# Patient Record
Sex: MALE | Race: WHITE | NOT HISPANIC OR LATINO
[De-identification: names, ages, dates, MRNs, and addresses within clinical notes are randomized per-mention and may not be internally consistent; named-entity substitution may affect disease eponyms.]

---

## 2018-07-09 PROBLEM — Z00.00 ENCOUNTER FOR PREVENTIVE HEALTH EXAMINATION: Status: ACTIVE | Noted: 2018-07-09

## 2018-07-31 PROBLEM — M54.5 BACK PAIN, LUMBOSACRAL: Status: ACTIVE | Noted: 2018-07-31

## 2018-08-02 ENCOUNTER — APPOINTMENT (OUTPATIENT)
Dept: ORTHOPEDIC SURGERY | Facility: CLINIC | Age: 63
End: 2018-08-02
Payer: MEDICAID

## 2018-08-02 VITALS
BODY MASS INDEX: 25.73 KG/M2 | HEIGHT: 72 IN | HEART RATE: 70 BPM | WEIGHT: 190 LBS | DIASTOLIC BLOOD PRESSURE: 78 MMHG | SYSTOLIC BLOOD PRESSURE: 153 MMHG

## 2018-08-02 DIAGNOSIS — M54.5 LOW BACK PAIN: ICD-10-CM

## 2018-08-02 DIAGNOSIS — Z82.61 FAMILY HISTORY OF ARTHRITIS: ICD-10-CM

## 2018-08-02 DIAGNOSIS — Z87.39 PERSONAL HISTORY OF OTHER DISEASES OF THE MUSCULOSKELETAL SYSTEM AND CONNECTIVE TISSUE: ICD-10-CM

## 2018-08-02 DIAGNOSIS — Z87.891 PERSONAL HISTORY OF NICOTINE DEPENDENCE: ICD-10-CM

## 2018-08-02 DIAGNOSIS — Z85.828 PERSONAL HISTORY OF OTHER MALIGNANT NEOPLASM OF SKIN: ICD-10-CM

## 2018-08-02 DIAGNOSIS — Z80.9 FAMILY HISTORY OF MALIGNANT NEOPLASM, UNSPECIFIED: ICD-10-CM

## 2018-08-02 DIAGNOSIS — L93.1 SUBACUTE CUTANEOUS LUPUS ERYTHEMATOSUS: ICD-10-CM

## 2018-08-02 PROCEDURE — 99205 OFFICE O/P NEW HI 60 MIN: CPT

## 2018-08-02 PROCEDURE — 72100 X-RAY EXAM L-S SPINE 2/3 VWS: CPT

## 2018-08-02 PROCEDURE — 72170 X-RAY EXAM OF PELVIS: CPT

## 2018-08-02 RX ORDER — ALPRAZOLAM 2 MG/1
TABLET ORAL
Refills: 0 | Status: ACTIVE | COMMUNITY

## 2018-08-02 RX ORDER — METFORMIN HYDROCHLORIDE 625 MG/1
TABLET ORAL
Refills: 0 | Status: ACTIVE | COMMUNITY

## 2018-08-09 ENCOUNTER — OTHER (OUTPATIENT)
Age: 63
End: 2018-08-09

## 2018-08-24 ENCOUNTER — APPOINTMENT (OUTPATIENT)
Dept: ORTHOPEDIC SURGERY | Facility: CLINIC | Age: 63
End: 2018-08-24
Payer: MEDICAID

## 2018-08-24 VITALS
BODY MASS INDEX: 25.73 KG/M2 | HEART RATE: 72 BPM | WEIGHT: 190 LBS | HEIGHT: 72 IN | DIASTOLIC BLOOD PRESSURE: 75 MMHG | SYSTOLIC BLOOD PRESSURE: 166 MMHG

## 2018-08-24 PROCEDURE — 99215 OFFICE O/P EST HI 40 MIN: CPT

## 2018-09-06 ENCOUNTER — APPOINTMENT (OUTPATIENT)
Dept: ORTHOPEDIC SURGERY | Facility: CLINIC | Age: 63
End: 2018-09-06
Payer: MEDICAID

## 2018-09-06 VITALS
SYSTOLIC BLOOD PRESSURE: 178 MMHG | WEIGHT: 190 LBS | HEIGHT: 72 IN | DIASTOLIC BLOOD PRESSURE: 88 MMHG | BODY MASS INDEX: 25.73 KG/M2 | HEART RATE: 76 BPM

## 2018-09-06 PROCEDURE — 99214 OFFICE O/P EST MOD 30 MIN: CPT

## 2018-09-24 ENCOUNTER — OTHER (OUTPATIENT)
Age: 63
End: 2018-09-24

## 2018-10-25 ENCOUNTER — OTHER (OUTPATIENT)
Age: 63
End: 2018-10-25

## 2018-10-29 ENCOUNTER — APPOINTMENT (OUTPATIENT)
Dept: ORTHOPEDIC SURGERY | Facility: CLINIC | Age: 63
End: 2018-10-29
Payer: MEDICAID

## 2018-10-29 ENCOUNTER — OTHER (OUTPATIENT)
Age: 63
End: 2018-10-29

## 2018-10-29 VITALS
WEIGHT: 190 LBS | SYSTOLIC BLOOD PRESSURE: 170 MMHG | HEART RATE: 73 BPM | DIASTOLIC BLOOD PRESSURE: 88 MMHG | HEIGHT: 72 IN | BODY MASS INDEX: 25.73 KG/M2

## 2018-10-29 PROCEDURE — 99214 OFFICE O/P EST MOD 30 MIN: CPT

## 2018-10-29 PROCEDURE — 73502 X-RAY EXAM HIP UNI 2-3 VIEWS: CPT | Mod: RT

## 2018-11-02 ENCOUNTER — OUTPATIENT (OUTPATIENT)
Dept: OUTPATIENT SERVICES | Facility: HOSPITAL | Age: 63
LOS: 1 days | End: 2018-11-02

## 2018-11-02 ENCOUNTER — APPOINTMENT (OUTPATIENT)
Dept: ULTRASOUND IMAGING | Facility: CLINIC | Age: 63
End: 2018-11-02
Payer: MEDICAID

## 2018-11-02 DIAGNOSIS — M16.11 UNILATERAL PRIMARY OSTEOARTHRITIS, RIGHT HIP: ICD-10-CM

## 2018-11-02 PROCEDURE — 73525 CONTRAST X-RAY OF HIP: CPT | Mod: 26,RT

## 2018-11-02 PROCEDURE — 27093 INJECTION FOR HIP X-RAY: CPT | Mod: RT

## 2018-11-19 ENCOUNTER — APPOINTMENT (OUTPATIENT)
Dept: ORTHOPEDIC SURGERY | Facility: CLINIC | Age: 63
End: 2018-11-19
Payer: MEDICAID

## 2018-11-19 VITALS
BODY MASS INDEX: 25.73 KG/M2 | DIASTOLIC BLOOD PRESSURE: 76 MMHG | WEIGHT: 190 LBS | HEART RATE: 67 BPM | HEIGHT: 72 IN | SYSTOLIC BLOOD PRESSURE: 163 MMHG

## 2018-11-19 PROCEDURE — 99213 OFFICE O/P EST LOW 20 MIN: CPT

## 2018-11-30 ENCOUNTER — APPOINTMENT (OUTPATIENT)
Dept: PHYSICAL MEDICINE AND REHAB | Facility: CLINIC | Age: 63
End: 2018-11-30
Payer: MEDICAID

## 2018-11-30 VITALS
WEIGHT: 190 LBS | DIASTOLIC BLOOD PRESSURE: 88 MMHG | BODY MASS INDEX: 25.73 KG/M2 | SYSTOLIC BLOOD PRESSURE: 171 MMHG | HEIGHT: 72 IN

## 2018-11-30 DIAGNOSIS — M47.816 SPONDYLOSIS W/OUT MYELOPATHY OR RADICULOPATHY, LUMBAR REGION: ICD-10-CM

## 2018-11-30 PROCEDURE — 99204 OFFICE O/P NEW MOD 45 MIN: CPT

## 2018-12-03 ENCOUNTER — OTHER (OUTPATIENT)
Age: 63
End: 2018-12-03

## 2018-12-03 PROBLEM — M47.816 LUMBAR SPONDYLOSIS: Status: ACTIVE | Noted: 2018-08-02

## 2018-12-13 ENCOUNTER — APPOINTMENT (OUTPATIENT)
Dept: ORTHOPEDIC SURGERY | Facility: CLINIC | Age: 63
End: 2018-12-13

## 2018-12-30 ENCOUNTER — TRANSCRIPTION ENCOUNTER (OUTPATIENT)
Age: 63
End: 2018-12-30

## 2018-12-31 ENCOUNTER — APPOINTMENT (OUTPATIENT)
Dept: PHYSICAL MEDICINE AND REHAB | Facility: CLINIC | Age: 63
End: 2018-12-31
Payer: MEDICAID

## 2018-12-31 ENCOUNTER — OUTPATIENT (OUTPATIENT)
Dept: OUTPATIENT SERVICES | Facility: HOSPITAL | Age: 63
LOS: 1 days | End: 2018-12-31
Payer: MEDICAID

## 2018-12-31 DIAGNOSIS — M54.16 RADICULOPATHY, LUMBAR REGION: ICD-10-CM

## 2018-12-31 LAB — GLUCOSE BLDC GLUCOMTR-MCNC: 145 MG/DL — HIGH (ref 70–99)

## 2018-12-31 PROCEDURE — 76000 FLUOROSCOPY <1 HR PHYS/QHP: CPT

## 2018-12-31 PROCEDURE — 82962 GLUCOSE BLOOD TEST: CPT

## 2018-12-31 PROCEDURE — 64483 NJX AA&/STRD TFRM EPI L/S 1: CPT

## 2018-12-31 PROCEDURE — 64484 NJX AA&/STRD TFRM EPI L/S EA: CPT

## 2018-12-31 PROCEDURE — 64484 NJX AA&/STRD TFRM EPI L/S EA: CPT | Mod: RT

## 2018-12-31 PROCEDURE — 64483 NJX AA&/STRD TFRM EPI L/S 1: CPT | Mod: RT

## 2019-01-25 ENCOUNTER — APPOINTMENT (OUTPATIENT)
Dept: ORTHOPEDIC SURGERY | Facility: CLINIC | Age: 64
End: 2019-01-25
Payer: MEDICAID

## 2019-01-25 VITALS
HEIGHT: 72 IN | BODY MASS INDEX: 25.73 KG/M2 | HEART RATE: 73 BPM | DIASTOLIC BLOOD PRESSURE: 85 MMHG | SYSTOLIC BLOOD PRESSURE: 155 MMHG | WEIGHT: 190 LBS

## 2019-01-25 DIAGNOSIS — Z82.62 FAMILY HISTORY OF OSTEOPOROSIS: ICD-10-CM

## 2019-01-25 PROCEDURE — 99214 OFFICE O/P EST MOD 30 MIN: CPT

## 2019-01-25 PROCEDURE — 72100 X-RAY EXAM L-S SPINE 2/3 VWS: CPT

## 2019-01-25 NOTE — PHYSICAL EXAM
[Poor Appearance] : well-appearing [Acute Distress] : not in acute distress [Obese] : not obese [de-identified] : CONSTITUTIONAL: The patient is a very pleasant individual who is well-nourished and who appears stated age.\par PSYCHIATRIC: The patient is alert and oriented X 3 and in no apparent distress, and participates with orthopedic evaluation well.\par HEAD: Atraumatic and is nonsyndromic in appearance.\par EENT: No visible thyromegaly, EOMI.\par RESPIRATORY: Respiratory rate is regular, not dyspneic on examination.\par LYMPHATICS: There is no inguinal lymphadenopathy\par INTEGUMENTARY: Skin is clean, dry, and intact about the bilateral lower extremities and lumbar spine.\par VASCULAR: There is brisk capillary refill about the bilateral lower extremities.\par NEUROLOGIC: There are no pathologic reflexes. There is no decrease in sensation of the bilateral lower extremities on Wartenberg pinwheel examination. Deep tendon reflexes are well maintained at 2+/4 of the bilateral lower extremities and are symmetric.\par MUSCULOSKELETAL: There is no visible muscular atrophy. Manual motor strength is well maintained in the bilateral lower extremities. Range of motion of lumbar spine is well maintained. The patient ambulates in a non-myelopathic manner. Negative tension sign and straight leg raise bilaterally. Quad extension, ankle dorsiflexion, EHL, plantar flexion, and ankle eversion are well preserved. Normal secondary orthopaedic exam of bilateral hips, greater trochanteric area, knees and ankles \par \par neurogenic claudication. some right greater trochanteric bursitis. [de-identified] : X-ray of the lumbar spine taken today shows age appropriate lumbar spondylosis, worse at L5-S1

## 2019-01-25 NOTE — ADDENDUM
[FreeTextEntry1] : Documented by Reuben Teixeira acting as a scribe for Dr. Jam Chen on 01/25/2019 . All medical record entries made by the Scribe were at my, Dr. Jam Chen, direction and personally dictated by me on 01/25/2019 . I have reviewed the chart and agree that the record accurately reflects my personal performance of the history, physical exam, assessment and plan. I have also personally directed, reviewed, and agreed with the chart.

## 2019-01-25 NOTE — HISTORY OF PRESENT ILLNESS
[Standing] : worsened by standing [Walking] : worsened by walking [de-identified] : 63 year old M presents with lumbar spine pain s/p epidural injection with Dr. Mohinder Guillen. He has not seen Dr. Guillen since the injection. He cannot get an MRI secondary to defibrillator. His CC is RLE sciatica upon standing, and now he is starting to get signs and symptoms in his LLE. [Sitting] : not worsened by sitting [Ataxia] : no ataxia [Incontinence] : no incontinence [Loss of Dexterity] : good dexterity [Urinary Ret.] : no urinary retention

## 2019-01-25 NOTE — DISCUSSION/SUMMARY
[de-identified] : I do think that this patient is a candidate for lumbar laminectomy with a goal to relieve his LE signs and symptoms when he stands and walks, neurogenic claudication. I have made it clear with this patient, and he understands, that this surgery is not to resolve his back pain. I will order a new lumbar CT to acquire up to date imaging and have a further surgical discussion with this patient with re: to a multi level lumbar lami. Pt wants surgical intervention. Pt should return after he obtains his CT.\par \par A long discussion was had with the patient regarding Lumbar surgical plan of lumbar laminectomy. Anatomic models, Xrays, CT scans/MRI’s were utilized to provide a firm understanding of their surgical plan. Patient is aware that surgery is elective in nature and he choosing to proceed with surgery. Risks, benefits, alternatives were discussed and all questions, comments and concerns were encouraged and answered to the patient's satisfaction. The statistical probability of improvement was discussed at length as well as post surgical course. Literature from [employer] spine society was provided to the patient regarding the specific type of surgery as well as a 5 page written surgical consent which the patient will need to sign and return to the office prior to surgical date. Consent forms highlight specific complications related to the complex nature of spinal surgery\par  \par Risks of lumbar surgery include: persistent pain, adjacent segment disease (which will require more surgery in future), dural tears, neurologic injury, and wound healing complications\par  \par Benefits of lumbar surgery include Improved neurologic and pain score\par  \par We also discussed with the patient complications of incisions directly related to obesity, diabetes, previous wound complications or post-surgical wound infections, smoking, neuropathy, and chronic anticoagulation. This risk has been specifically discussed and the patient will discuss modifiable risk factors to be optimized prior to surgical management. A multimodality approach of primary care physician, and medicine subspecialist will be utilized to optimize medical risk factors.\par  \par If patient is a smoker, discontinuation of smoking was advised and must be accomplished 6-8 weeks prior to surgery date. Patient was advised that help with quitting smoking is available through [place] Smoker's Quit Line and phone number/website was provided, or patient can ask assistance from primary care provider. Elective surgery will not be performed unless patient complies with this policy.\par \par Medical comorbidities including but not limited to diabetes, coronary artery disease, renal insufficiency, uncontrolled hypertension, rheumatoid arthritis, auto-immune disorders, COPD/asthma and/or history of radiation, chemotherapy, being on anticoagulants, chronic steroids, immune modulators, have increased statistical chance of leading to increased hospital stay, protracted recovery period, need for acute or subacute rehabilitation post-operative. There can be increased probability of post-surgical and medical complications including but not limited to surgical site infection, need for revision surgery, deep vein thrombosis, pulmonary embolism, exacerbation of COPD/asthma, pneumonia, UTI, urinary retention, and ileus.

## 2019-02-05 ENCOUNTER — OTHER (OUTPATIENT)
Age: 64
End: 2019-02-05

## 2019-02-06 ENCOUNTER — OTHER (OUTPATIENT)
Age: 64
End: 2019-02-06

## 2019-02-07 ENCOUNTER — APPOINTMENT (OUTPATIENT)
Dept: ORTHOPEDIC SURGERY | Facility: CLINIC | Age: 64
End: 2019-02-07

## 2019-02-08 ENCOUNTER — APPOINTMENT (OUTPATIENT)
Dept: ORTHOPEDIC SURGERY | Facility: CLINIC | Age: 64
End: 2019-02-08
Payer: MEDICAID

## 2019-02-08 VITALS
HEART RATE: 75 BPM | SYSTOLIC BLOOD PRESSURE: 171 MMHG | DIASTOLIC BLOOD PRESSURE: 86 MMHG | HEIGHT: 72 IN | WEIGHT: 190 LBS | BODY MASS INDEX: 25.73 KG/M2

## 2019-02-08 DIAGNOSIS — M32.9 SYSTEMIC LUPUS ERYTHEMATOSUS, UNSPECIFIED: ICD-10-CM

## 2019-02-08 DIAGNOSIS — Z87.39 PERSONAL HISTORY OF OTHER DISEASES OF THE MUSCULOSKELETAL SYSTEM AND CONNECTIVE TISSUE: ICD-10-CM

## 2019-02-08 DIAGNOSIS — Z86.39 PERSONAL HISTORY OF OTHER ENDOCRINE, NUTRITIONAL AND METABOLIC DISEASE: ICD-10-CM

## 2019-02-08 DIAGNOSIS — E11.9 TYPE 2 DIABETES MELLITUS W/OUT COMPLICATIONS: ICD-10-CM

## 2019-02-08 DIAGNOSIS — Z86.79 PERSONAL HISTORY OF OTHER DISEASES OF THE CIRCULATORY SYSTEM: ICD-10-CM

## 2019-02-08 PROCEDURE — 99215 OFFICE O/P EST HI 40 MIN: CPT

## 2019-02-11 PROBLEM — Z86.79 HISTORY OF HYPERTENSION: Status: RESOLVED | Noted: 2018-08-02 | Resolved: 2019-02-11

## 2019-02-11 PROBLEM — M32.9 LUPUS: Status: RESOLVED | Noted: 2018-08-02 | Resolved: 2019-02-11

## 2019-02-11 PROBLEM — Z86.39 HISTORY OF HIGH CHOLESTEROL: Status: RESOLVED | Noted: 2018-08-02 | Resolved: 2019-02-11

## 2019-02-11 PROBLEM — Z86.79 HISTORY OF CARDIAC DISORDER: Status: RESOLVED | Noted: 2018-08-02 | Resolved: 2019-02-11

## 2019-02-11 PROBLEM — Z87.39 HISTORY OF RHEUMATOID ARTHRITIS: Status: RESOLVED | Noted: 2018-08-02 | Resolved: 2019-02-11

## 2019-02-11 PROBLEM — Z86.39 HISTORY OF DIABETES MELLITUS: Status: RESOLVED | Noted: 2018-08-02 | Resolved: 2019-02-11

## 2019-02-11 NOTE — ADDENDUM
[FreeTextEntry1] : Documented by Reuben Teixeira acting as a scribe for Dr. Jam Chen on 02/08/2019 . All medical record entries made by the Scribe were at my, Dr. Jam Chen, direction and personally dictated by me on 02/08/2019 . I have reviewed the chart and agree that the record accurately reflects my personal performance of the history, physical exam, assessment and plan. I have also personally directed, reviewed, and agreed with the chart.

## 2019-02-11 NOTE — PHYSICAL EXAM
[Poor Appearance] : well-appearing [Acute Distress] : not in acute distress [de-identified] : CONSTITUTIONAL: The patient is a very pleasant individual who is well-nourished and who appears stated age.\par PSYCHIATRIC: The patient is alert and oriented X 3 and in no apparent distress, and participates with orthopedic evaluation well.\par HEAD: Atraumatic and is nonsyndromic in appearance.\par EENT: No visible thyromegaly, EOMI.\par RESPIRATORY: Respiratory rate is regular, not dyspneic on examination.\par LYMPHATICS: There is no inguinal lymphadenopathy\par INTEGUMENTARY: Skin is clean, dry, and intact about the bilateral lower extremities and lumbar spine.\par VASCULAR: There is brisk capillary refill about the bilateral lower extremities.\par NEUROLOGIC: There are no pathologic reflexes. There is no decrease in sensation of the bilateral lower extremities on Wartenberg pinwheel examination. Deep tendon reflexes are well maintained at 2+/4 of the bilateral lower extremities and are symmetric.\par MUSCULOSKELETAL: There is no visible muscular atrophy. Manual motor strength is well maintained in the bilateral lower extremities. Range of motion of lumbar spine is well maintained. The patient ambulates in a non-myelopathic manner. Negative tension sign and straight leg raise bilaterally. Quad extension, ankle dorsiflexion, EHL, plantar flexion, and ankle eversion are well preserved. Normal secondary orthopaedic exam of greater trochanteric area, and ankles \par \par mainly consistent with neurogenic claudication. He does have focal right hip and knee pain which I think is a contributing factor [de-identified] : CT from [facility] dated 1/30/19 that shows severe spinal stenosis at L2-L3 and moderate at L4-L5.

## 2019-02-11 NOTE — DISCUSSION/SUMMARY
[de-identified] : A long discussion was had with the patient regarding surgical plan. Patient is aware that surgery is elective in nature and is choosing to proceed with surgery. Risks, benefits, alternatives were discussed and all questions, comments and concerns were answered to the patient's satisfaction. The statistical probability of improvement was discussed at length as well as post surgical course.  Literature was provided regarding the specific type of surgery as well as a written surgical consent which the patient will need to sign and return to the office prior to surgical date.  \par If patient is a smoker, discontinuation of smoking was advised and must be accomplished 6-8 weeks prior to surgery date.  Patient was advised that help with quitting smoking is available through [facility] Smoker's Quit Line and phone number/website was provided, or patient can ask assistance from primary care provider.  Elective surgery will not be performed unless patient complies with this policy. \par \par Her very pleasant conversation with this gentleman and a very thorough conversation with him with regard to the roll of a lumbar laminectomy laminectomies been proposed at L2 L3-L4. With a slight undercut/partial laminectomy of L1. The goal of the surgery is to improve his neurogenic claudication. Patient states his leg pain ensues after only a few minutes of ambulation and her standing. Patient does have multiple factors including right hip arthritis right knee arthritis and previous broken ankle etc. I do think he'll have some improvement of his signs and symptoms. Patient is clearly aware of and complete resolution of signs and symptoms. Revision surgery dural tears chronic back pain etc. based upon his limited lifestyle secondary to neurogenic claudication patient wishes to pursue a lumbar laminectomy at L2-L3 and L4.\par \par Complex pt given past med hx of HTN, HLD, Cardiac disorder with a loop recorder, RA and lupus. will require mulitmodality mngt for optimal spine outcomes

## 2019-02-13 PROBLEM — E11.9 DIABETES MELLITUS, TYPE 2: Status: ACTIVE | Noted: 2019-02-13

## 2019-02-19 ENCOUNTER — OUTPATIENT (OUTPATIENT)
Dept: OUTPATIENT SERVICES | Facility: HOSPITAL | Age: 64
LOS: 1 days | End: 2019-02-19
Payer: MEDICAID

## 2019-02-19 VITALS
HEIGHT: 72 IN | SYSTOLIC BLOOD PRESSURE: 156 MMHG | HEART RATE: 70 BPM | TEMPERATURE: 98 F | DIASTOLIC BLOOD PRESSURE: 78 MMHG | WEIGHT: 184.09 LBS | RESPIRATION RATE: 20 BRPM

## 2019-02-19 DIAGNOSIS — Z01.818 ENCOUNTER FOR OTHER PREPROCEDURAL EXAMINATION: ICD-10-CM

## 2019-02-19 DIAGNOSIS — M48.061 SPINAL STENOSIS, LUMBAR REGION WITHOUT NEUROGENIC CLAUDICATION: ICD-10-CM

## 2019-02-19 DIAGNOSIS — Z98.890 OTHER SPECIFIED POSTPROCEDURAL STATES: Chronic | ICD-10-CM

## 2019-02-19 DIAGNOSIS — E11.9 TYPE 2 DIABETES MELLITUS WITHOUT COMPLICATIONS: ICD-10-CM

## 2019-02-19 DIAGNOSIS — I42.8 OTHER CARDIOMYOPATHIES: ICD-10-CM

## 2019-02-19 DIAGNOSIS — Z29.9 ENCOUNTER FOR PROPHYLACTIC MEASURES, UNSPECIFIED: ICD-10-CM

## 2019-02-19 DIAGNOSIS — I10 ESSENTIAL (PRIMARY) HYPERTENSION: ICD-10-CM

## 2019-02-19 DIAGNOSIS — Z95.810 PRESENCE OF AUTOMATIC (IMPLANTABLE) CARDIAC DEFIBRILLATOR: Chronic | ICD-10-CM

## 2019-02-19 LAB
ANION GAP SERPL CALC-SCNC: 8 MMOL/L — SIGNIFICANT CHANGE UP (ref 5–17)
APTT BLD: 29 SEC — SIGNIFICANT CHANGE UP (ref 27.5–36.3)
BASOPHILS # BLD AUTO: 0 K/UL — SIGNIFICANT CHANGE UP (ref 0–0.2)
BASOPHILS NFR BLD AUTO: 0.4 % — SIGNIFICANT CHANGE UP (ref 0–2)
BLD GP AB SCN SERPL QL: SIGNIFICANT CHANGE UP
BUN SERPL-MCNC: 8 MG/DL — SIGNIFICANT CHANGE UP (ref 8–20)
CALCIUM SERPL-MCNC: 9.3 MG/DL — SIGNIFICANT CHANGE UP (ref 8.6–10.2)
CHLORIDE SERPL-SCNC: 97 MMOL/L — LOW (ref 98–107)
CO2 SERPL-SCNC: 27 MMOL/L — SIGNIFICANT CHANGE UP (ref 22–29)
CREAT SERPL-MCNC: 0.58 MG/DL — SIGNIFICANT CHANGE UP (ref 0.5–1.3)
EOSINOPHIL # BLD AUTO: 0 K/UL — SIGNIFICANT CHANGE UP (ref 0–0.5)
EOSINOPHIL NFR BLD AUTO: 1.8 % — SIGNIFICANT CHANGE UP (ref 0–5)
GLUCOSE SERPL-MCNC: 145 MG/DL — HIGH (ref 70–115)
HBA1C BLD-MCNC: 6.2 % — HIGH (ref 4–5.6)
HCT VFR BLD CALC: 42.6 % — SIGNIFICANT CHANGE UP (ref 42–52)
HGB BLD-MCNC: 14.8 G/DL — SIGNIFICANT CHANGE UP (ref 14–18)
INR BLD: 1.3 RATIO — HIGH (ref 0.88–1.16)
LYMPHOCYTES # BLD AUTO: 0.7 K/UL — LOW (ref 1–4.8)
LYMPHOCYTES # BLD AUTO: 32 % — SIGNIFICANT CHANGE UP (ref 20–55)
MCHC RBC-ENTMCNC: 34.6 PG — HIGH (ref 27–31)
MCHC RBC-ENTMCNC: 34.7 G/DL — SIGNIFICANT CHANGE UP (ref 32–36)
MCV RBC AUTO: 99.5 FL — HIGH (ref 80–94)
MONOCYTES # BLD AUTO: 0.4 K/UL — SIGNIFICANT CHANGE UP (ref 0–0.8)
MONOCYTES NFR BLD AUTO: 16 % — HIGH (ref 3–10)
MRSA PCR RESULT.: SIGNIFICANT CHANGE UP
NEUTROPHILS # BLD AUTO: 1.1 K/UL — LOW (ref 1.8–8)
NEUTROPHILS NFR BLD AUTO: 49.8 % — SIGNIFICANT CHANGE UP (ref 37–73)
PLATELET # BLD AUTO: 90 K/UL — LOW (ref 150–400)
POTASSIUM SERPL-MCNC: 4.5 MMOL/L — SIGNIFICANT CHANGE UP (ref 3.5–5.3)
POTASSIUM SERPL-SCNC: 4.5 MMOL/L — SIGNIFICANT CHANGE UP (ref 3.5–5.3)
PROTHROM AB SERPL-ACNC: 15.1 SEC — HIGH (ref 10–12.9)
RBC # BLD: 4.28 M/UL — LOW (ref 4.6–6.2)
RBC # FLD: 12.6 % — SIGNIFICANT CHANGE UP (ref 11–15.6)
S AUREUS DNA NOSE QL NAA+PROBE: DETECTED
SODIUM SERPL-SCNC: 132 MMOL/L — LOW (ref 135–145)
TYPE + AB SCN PNL BLD: SIGNIFICANT CHANGE UP
WBC # BLD: 2.2 K/UL — LOW (ref 4.8–10.8)
WBC # FLD AUTO: 2.2 K/UL — LOW (ref 4.8–10.8)

## 2019-02-19 PROCEDURE — 85027 COMPLETE CBC AUTOMATED: CPT

## 2019-02-19 PROCEDURE — 83036 HEMOGLOBIN GLYCOSYLATED A1C: CPT

## 2019-02-19 PROCEDURE — 86850 RBC ANTIBODY SCREEN: CPT

## 2019-02-19 PROCEDURE — 87641 MR-STAPH DNA AMP PROBE: CPT

## 2019-02-19 PROCEDURE — G0463: CPT

## 2019-02-19 PROCEDURE — 80048 BASIC METABOLIC PNL TOTAL CA: CPT

## 2019-02-19 PROCEDURE — 86901 BLOOD TYPING SEROLOGIC RH(D): CPT

## 2019-02-19 PROCEDURE — 36415 COLL VENOUS BLD VENIPUNCTURE: CPT

## 2019-02-19 PROCEDURE — 87640 STAPH A DNA AMP PROBE: CPT

## 2019-02-19 PROCEDURE — 85610 PROTHROMBIN TIME: CPT

## 2019-02-19 PROCEDURE — 86900 BLOOD TYPING SEROLOGIC ABO: CPT

## 2019-02-19 PROCEDURE — 85730 THROMBOPLASTIN TIME PARTIAL: CPT

## 2019-02-19 RX ORDER — ALPRAZOLAM 0.25 MG
0 TABLET ORAL
Qty: 90 | Refills: 0 | COMMUNITY

## 2019-02-19 RX ORDER — MUPIROCIN 20 MG/G
1 OINTMENT TOPICAL
Qty: 1 | Refills: 0 | OUTPATIENT
Start: 2019-02-19 | End: 2019-02-23

## 2019-02-19 RX ORDER — METFORMIN HYDROCHLORIDE 850 MG/1
0 TABLET ORAL
Qty: 60 | Refills: 0 | COMMUNITY

## 2019-02-19 RX ORDER — ATORVASTATIN CALCIUM 80 MG/1
0 TABLET, FILM COATED ORAL
Qty: 30 | Refills: 0 | COMMUNITY

## 2019-02-19 RX ORDER — BUPIVACAINE 13.3 MG/ML
20 INJECTION, SUSPENSION, LIPOSOMAL INFILTRATION ONCE
Qty: 0 | Refills: 0 | Status: DISCONTINUED | OUTPATIENT
Start: 2019-03-06 | End: 2019-03-09

## 2019-02-19 RX ORDER — SODIUM CHLORIDE 9 MG/ML
3 INJECTION INTRAMUSCULAR; INTRAVENOUS; SUBCUTANEOUS EVERY 8 HOURS
Qty: 0 | Refills: 0 | Status: DISCONTINUED | OUTPATIENT
Start: 2019-03-06 | End: 2019-03-09

## 2019-02-19 RX ORDER — LISINOPRIL 2.5 MG/1
0 TABLET ORAL
Qty: 30 | Refills: 0 | COMMUNITY

## 2019-02-19 RX ORDER — CARVEDILOL PHOSPHATE 80 MG/1
0 CAPSULE, EXTENDED RELEASE ORAL
Qty: 60 | Refills: 0 | COMMUNITY

## 2019-02-19 NOTE — H&P PST ADULT - PMH
DM (diabetes mellitus)    HLD (hyperlipidemia)    Hypertension    Nonischemic cardiomyopathy  s/p biventricular AICD March 2014

## 2019-02-19 NOTE — PATIENT PROFILE ADULT - NSPROEDALEARNPREF_GEN_A_NUR
verbal instruction/written material/individual instruction/Instructed pt on pre-op instructions tips for safer surgery, pain management scale, pre-surgical infection prevention instructions, MRSA/MSSA instructions, medical clearance - pending, Cardiac clearance - done, take Lisinopril & Carvedilol with a sip of water the morning of surgery, do not take Metformin the morning of surgery, diabetes club schedule given, understanding of all instructions verbalized,

## 2019-02-19 NOTE — H&P PST ADULT - NSANTHOSAYNRD_GEN_A_CORE
No. MIRTHA screening performed.  STOP BANG Legend: 0-2 = LOW Risk; 3-4 = INTERMEDIATE Risk; 5-8 = HIGH Risk

## 2019-02-19 NOTE — H&P PST ADULT - ASSESSMENT
63 year old male with h/o HTN, HLD, nonischemic cardiomyopathy s/p ACID present with lower back and  chronic neurogenic claudication that is worsening.  He is now schedule for lumbar laminectomy @ L2, L3, L 4    CAPRINI VTE 2.0 SCORE [CLOT updated 2019]    AGE RELATED RISK FACTORS                                                       MOBILITY RELATED FACTORS  [ ] Age 41-60 years                                            (1 Point)                    [ ] Bed rest                                                        (1 Point)  [x ] Age: 61-74 years                                           (2 Points)                  [ ] Plaster cast                                                   (2 Points)  [ ] Age= 75 years                                              (3 Points)                    [ ] Bed bound for more than 72 hours                 (2 Points)    DISEASE RELATED RISK FACTORS                                               GENDER SPECIFIC FACTORS  [ ] Edema in the lower extremities                       (1 Point)              [ ] Pregnancy                                                     (1 Point)  [x ] Varicose veins                                               (1 Point)                     [ ] Post-partum < 6 weeks                                   (1 Point)             [x ] BMI > 25 Kg/m2                                            (1 Point)                     [ ] Hormonal therapy  or oral contraception          (1 Point)                 [ ] Sepsis (in the previous month)                        (1 Point)               [ ] History of pregnancy complications                 (1 point)  [ ] Pneumonia or serious lung disease                                               [ ] Unexplained or recurrent                     (1 Point)           (in the previous month)                               (1 Point)  [ ] Abnormal pulmonary function test                     (1 Point)                 SURGERY RELATED RISK FACTORS  [ ] Acute myocardial infarction                              (1 Point)               [ ]  Section                                             (1 Point)  [ ] Congestive heart failure (in the previous month)  (1 Point)      [ ] Minor surgery                                                  (1 Point)   [ ] Inflammatory bowel disease                             (1 Point)               [ ] Arthroscopic surgery                                        (2 Points)  [ ] Central venous access                                      (2 Points)                [x ] General surgery lasting more than 45 minutes (2 points)  [ ] Malignancy- Present or previous                   (2 Points)                [ ] Elective arthroplasty                                         (5 points)    [ ] Stroke (in the previous month)                          (5 Points)                                                                                                                                                           HEMATOLOGY RELATED FACTORS                                                 TRAUMA RELATED RISK FACTORS  [ ] Prior episodes of VTE                                     (3 Points)                [ ] Fracture of the hip, pelvis, or leg                       (5 Points)  [ ] Positive family history for VTE                         (3 Points)             [ ] Acute spinal cord injury (in the previous month)  (5 Points)  [ ] Prothrombin 12920 A                                     (3 Points)               [ ] Paralysis  (less than 1 month)                             (5 Points)  [ ] Factor V Leiden                                             (3 Points)                  [ ] Multiple Trauma within 1 month                        (5 Points)  [ ] Lupus anticoagulants                                     (3 Points)                                                           [ ] Anticardiolipin antibodies                               (3 Points)                                                       [ ] High homocysteine in the blood                      (3 Points)                                             [ ] Other congenital or acquired thrombophilia      (3 Points)                                                [ ] Heparin induced thrombocytopenia                  (3 Points)                                     Total Score [   6   ]    OPIOID RISK TOOL    FATUMA EACH BOX THAT APPLIES AND ADD TOTALS AT THE END    FAMILY HISTORY OF SUBSTANCE ABUSE                 FEMALE         MALE                                                Alcohol                             [  ]1 pt          [  ]3pts                                               Illegal Durgs                     [  ]2 pts        [  ]3pts                                               Rx Drugs                           [  ]4 pts        [  ]4 pts    PERSONAL HISTORY OF SUBSTANCE ABUSE                                                                                          Alcohol                             [  ]3 pts       [  ]3 pts                                               Illegal Drugs                     [  ]4 pts        [  ]4 pts                                               Rx Drugs                           [  ]5 pts        [  ]5 pts    AGE BETWEEN 16-45 YEARS                                      [  ]1 pt         [  ]1 pt    HISTORY OF PREADOLESCENT   SEXUAL ABUSE                                                             [  ]3 pts        [  ]0pts    PSYCHOLOGICAL DISEASE                     ADD, OCD, Bipolar, Schizophrenia        [  ]2 pts         [  ]2 pts                      Depression                                        [  ]1 pt           [  ]1 pt           SCORING TOTAL   Zero                               A score of 3 or lower indicated LOW risk for future opioid abuse  A score of 4 to 7 indicated moderate risk for future opioid abuse  A score of 8 or higher indicates a high risk for opioid abuse

## 2019-02-19 NOTE — H&P PST ADULT - FAMILY HISTORY
Mother  Still living? Unknown  Family history of gastric cancer, Age at diagnosis: Age Unknown     Father  Still living? Unknown  Family history of colon cancer, Age at diagnosis: Age Unknown

## 2019-02-19 NOTE — H&P PST ADULT - HISTORY OF PRESENT ILLNESS
63 year old male with h/o HTN, HLD, nonischemic cardiomyopathy s/p ACID present with lower back and  chronic neurogenic claudication that is worsening. He states he can only walk for few minutes before pain and discomfort worsens which forces him to stop, sit, and use cane occasionally.  He is now schedule for laminectomy at L@-L4

## 2019-02-19 NOTE — PATIENT PROFILE ADULT - NSPROIMPLANTSMEDDEV_GEN_A_NUR
ST MIGEL IMPLANTABLE DEFIBRILLATOR    ICD MP8513-56U SERIAL # [id number] IMPLANTED 3/20/2014    RA-LEAD 1888tc/52 SERIAL # [id number] implanted 3/20/2014    RVA 7120Q/65  SERIAL # [id number]  implanted 3/20/2014    LCV 1458Q/86 SERIAL # [id number] implanted 3/20/2014    physician Santos Wright [facility]  phone [phone]/Pacemaker

## 2019-02-20 ENCOUNTER — OTHER (OUTPATIENT)
Age: 64
End: 2019-02-20

## 2019-02-20 DIAGNOSIS — D70.9 NEUTROPENIA, UNSPECIFIED: ICD-10-CM

## 2019-02-27 ENCOUNTER — APPOINTMENT (OUTPATIENT)
Dept: ORTHOPEDIC SURGERY | Facility: HOSPITAL | Age: 64
End: 2019-02-27

## 2019-02-28 ENCOUNTER — MOBILE ON CALL (OUTPATIENT)
Age: 64
End: 2019-02-28

## 2019-03-01 ENCOUNTER — OTHER (OUTPATIENT)
Age: 64
End: 2019-03-01

## 2019-03-01 ENCOUNTER — OUTPATIENT (OUTPATIENT)
Dept: OUTPATIENT SERVICES | Facility: HOSPITAL | Age: 64
LOS: 1 days | End: 2019-03-01
Payer: MEDICAID

## 2019-03-01 DIAGNOSIS — Z98.890 OTHER SPECIFIED POSTPROCEDURAL STATES: Chronic | ICD-10-CM

## 2019-03-01 DIAGNOSIS — Z95.810 PRESENCE OF AUTOMATIC (IMPLANTABLE) CARDIAC DEFIBRILLATOR: Chronic | ICD-10-CM

## 2019-03-01 PROCEDURE — G9001: CPT

## 2019-03-05 ENCOUNTER — TRANSCRIPTION ENCOUNTER (OUTPATIENT)
Age: 64
End: 2019-03-05

## 2019-03-06 ENCOUNTER — INPATIENT (INPATIENT)
Facility: HOSPITAL | Age: 64
LOS: 2 days | Discharge: HOME | DRG: 516 | End: 2019-03-09
Attending: ORTHOPAEDIC SURGERY | Admitting: ORTHOPAEDIC SURGERY
Payer: MEDICAID

## 2019-03-06 ENCOUNTER — APPOINTMENT (OUTPATIENT)
Dept: ORTHOPEDIC SURGERY | Facility: HOSPITAL | Age: 64
End: 2019-03-06

## 2019-03-06 ENCOUNTER — TRANSCRIPTION ENCOUNTER (OUTPATIENT)
Age: 64
End: 2019-03-06

## 2019-03-06 VITALS
TEMPERATURE: 98 F | RESPIRATION RATE: 16 BRPM | WEIGHT: 184.09 LBS | SYSTOLIC BLOOD PRESSURE: 125 MMHG | DIASTOLIC BLOOD PRESSURE: 73 MMHG | HEART RATE: 75 BPM | HEIGHT: 72 IN | OXYGEN SATURATION: 100 %

## 2019-03-06 DIAGNOSIS — Z95.810 PRESENCE OF AUTOMATIC (IMPLANTABLE) CARDIAC DEFIBRILLATOR: Chronic | ICD-10-CM

## 2019-03-06 DIAGNOSIS — M48.061 SPINAL STENOSIS, LUMBAR REGION WITHOUT NEUROGENIC CLAUDICATION: ICD-10-CM

## 2019-03-06 DIAGNOSIS — Z98.890 OTHER SPECIFIED POSTPROCEDURAL STATES: Chronic | ICD-10-CM

## 2019-03-06 LAB
ALBUMIN SERPL ELPH-MCNC: 3.3 G/DL — SIGNIFICANT CHANGE UP (ref 3.3–5.2)
ALP SERPL-CCNC: 67 U/L — SIGNIFICANT CHANGE UP (ref 40–120)
ALT FLD-CCNC: 49 U/L — HIGH
ANION GAP SERPL CALC-SCNC: 12 MMOL/L — SIGNIFICANT CHANGE UP (ref 5–17)
AST SERPL-CCNC: 46 U/L — HIGH
BILIRUB SERPL-MCNC: 0.9 MG/DL — SIGNIFICANT CHANGE UP (ref 0.4–2)
BLD GP AB SCN SERPL QL: SIGNIFICANT CHANGE UP
BUN SERPL-MCNC: 9 MG/DL — SIGNIFICANT CHANGE UP (ref 8–20)
CALCIUM SERPL-MCNC: 8.9 MG/DL — SIGNIFICANT CHANGE UP (ref 8.6–10.2)
CHLORIDE SERPL-SCNC: 104 MMOL/L — SIGNIFICANT CHANGE UP (ref 98–107)
CO2 SERPL-SCNC: 23 MMOL/L — SIGNIFICANT CHANGE UP (ref 22–29)
CREAT SERPL-MCNC: 0.71 MG/DL — SIGNIFICANT CHANGE UP (ref 0.5–1.3)
GLUCOSE BLDC GLUCOMTR-MCNC: 118 MG/DL — HIGH (ref 70–99)
GLUCOSE BLDC GLUCOMTR-MCNC: 258 MG/DL — HIGH (ref 70–99)
GLUCOSE BLDC GLUCOMTR-MCNC: 97 MG/DL — SIGNIFICANT CHANGE UP (ref 70–99)
GLUCOSE SERPL-MCNC: 100 MG/DL — SIGNIFICANT CHANGE UP (ref 70–115)
POTASSIUM SERPL-MCNC: 4.2 MMOL/L — SIGNIFICANT CHANGE UP (ref 3.5–5.3)
POTASSIUM SERPL-SCNC: 4.2 MMOL/L — SIGNIFICANT CHANGE UP (ref 3.5–5.3)
PROT SERPL-MCNC: 6.3 G/DL — LOW (ref 6.6–8.7)
SODIUM SERPL-SCNC: 139 MMOL/L — SIGNIFICANT CHANGE UP (ref 135–145)
TYPE + AB SCN PNL BLD: SIGNIFICANT CHANGE UP

## 2019-03-06 PROCEDURE — 63048 LAM FACETEC &FORAMOT EA ADDL: CPT

## 2019-03-06 PROCEDURE — 63047 LAM FACETEC & FORAMOT LUMBAR: CPT

## 2019-03-06 RX ORDER — GLUCAGON INJECTION, SOLUTION 0.5 MG/.1ML
1 INJECTION, SOLUTION SUBCUTANEOUS ONCE
Qty: 0 | Refills: 0 | Status: DISCONTINUED | OUTPATIENT
Start: 2019-03-06 | End: 2019-03-09

## 2019-03-06 RX ORDER — INSULIN LISPRO 100/ML
VIAL (ML) SUBCUTANEOUS
Qty: 0 | Refills: 0 | Status: DISCONTINUED | OUTPATIENT
Start: 2019-03-06 | End: 2019-03-07

## 2019-03-06 RX ORDER — INSULIN GLARGINE 100 [IU]/ML
10 INJECTION, SOLUTION SUBCUTANEOUS AT BEDTIME
Qty: 0 | Refills: 0 | Status: DISCONTINUED | OUTPATIENT
Start: 2019-03-06 | End: 2019-03-08

## 2019-03-06 RX ORDER — CARVEDILOL PHOSPHATE 80 MG/1
25 CAPSULE, EXTENDED RELEASE ORAL EVERY 12 HOURS
Qty: 0 | Refills: 0 | Status: DISCONTINUED | OUTPATIENT
Start: 2019-03-07 | End: 2019-03-09

## 2019-03-06 RX ORDER — METHOCARBAMOL 500 MG/1
500 TABLET, FILM COATED ORAL EVERY 6 HOURS
Qty: 0 | Refills: 0 | Status: DISCONTINUED | OUTPATIENT
Start: 2019-03-06 | End: 2019-03-09

## 2019-03-06 RX ORDER — DEXTROSE 50 % IN WATER 50 %
25 SYRINGE (ML) INTRAVENOUS ONCE
Qty: 0 | Refills: 0 | Status: DISCONTINUED | OUTPATIENT
Start: 2019-03-06 | End: 2019-03-09

## 2019-03-06 RX ORDER — SENNA PLUS 8.6 MG/1
2 TABLET ORAL AT BEDTIME
Qty: 0 | Refills: 0 | Status: DISCONTINUED | OUTPATIENT
Start: 2019-03-06 | End: 2019-03-09

## 2019-03-06 RX ORDER — DEXTROSE 50 % IN WATER 50 %
12.5 SYRINGE (ML) INTRAVENOUS ONCE
Qty: 0 | Refills: 0 | Status: DISCONTINUED | OUTPATIENT
Start: 2019-03-06 | End: 2019-03-09

## 2019-03-06 RX ORDER — ONDANSETRON 8 MG/1
4 TABLET, FILM COATED ORAL EVERY 6 HOURS
Qty: 0 | Refills: 0 | Status: DISCONTINUED | OUTPATIENT
Start: 2019-03-06 | End: 2019-03-09

## 2019-03-06 RX ORDER — DEXTROSE 50 % IN WATER 50 %
15 SYRINGE (ML) INTRAVENOUS ONCE
Qty: 0 | Refills: 0 | Status: DISCONTINUED | OUTPATIENT
Start: 2019-03-06 | End: 2019-03-09

## 2019-03-06 RX ORDER — DOCUSATE SODIUM 100 MG
100 CAPSULE ORAL THREE TIMES A DAY
Qty: 0 | Refills: 0 | Status: DISCONTINUED | OUTPATIENT
Start: 2019-03-06 | End: 2019-03-09

## 2019-03-06 RX ORDER — ATORVASTATIN CALCIUM 80 MG/1
10 TABLET, FILM COATED ORAL AT BEDTIME
Qty: 0 | Refills: 0 | Status: DISCONTINUED | OUTPATIENT
Start: 2019-03-06 | End: 2019-03-09

## 2019-03-06 RX ORDER — ONDANSETRON 8 MG/1
4 TABLET, FILM COATED ORAL ONCE
Qty: 0 | Refills: 0 | Status: DISCONTINUED | OUTPATIENT
Start: 2019-03-06 | End: 2019-03-06

## 2019-03-06 RX ORDER — GABAPENTIN 400 MG/1
300 CAPSULE ORAL THREE TIMES A DAY
Qty: 0 | Refills: 0 | Status: DISCONTINUED | OUTPATIENT
Start: 2019-03-06 | End: 2019-03-09

## 2019-03-06 RX ORDER — LISINOPRIL 2.5 MG/1
20 TABLET ORAL DAILY
Qty: 0 | Refills: 0 | Status: DISCONTINUED | OUTPATIENT
Start: 2019-03-06 | End: 2019-03-09

## 2019-03-06 RX ORDER — SODIUM CHLORIDE 9 MG/ML
1000 INJECTION, SOLUTION INTRAVENOUS
Qty: 0 | Refills: 0 | Status: DISCONTINUED | OUTPATIENT
Start: 2019-03-06 | End: 2019-03-09

## 2019-03-06 RX ORDER — ACETAMINOPHEN 500 MG
650 TABLET ORAL EVERY 6 HOURS
Qty: 0 | Refills: 0 | Status: DISCONTINUED | OUTPATIENT
Start: 2019-03-08 | End: 2019-03-09

## 2019-03-06 RX ORDER — HYDRALAZINE HCL 50 MG
10 TABLET ORAL EVERY 6 HOURS
Qty: 0 | Refills: 0 | Status: DISCONTINUED | OUTPATIENT
Start: 2019-03-06 | End: 2019-03-09

## 2019-03-06 RX ORDER — HYDROMORPHONE HYDROCHLORIDE 2 MG/ML
0.5 INJECTION INTRAMUSCULAR; INTRAVENOUS; SUBCUTANEOUS
Qty: 0 | Refills: 0 | Status: DISCONTINUED | OUTPATIENT
Start: 2019-03-06 | End: 2019-03-06

## 2019-03-06 RX ORDER — MORPHINE SULFATE 50 MG/1
2 CAPSULE, EXTENDED RELEASE ORAL EVERY 4 HOURS
Qty: 0 | Refills: 0 | Status: DISCONTINUED | OUTPATIENT
Start: 2019-03-06 | End: 2019-03-09

## 2019-03-06 RX ORDER — SODIUM CHLORIDE 9 MG/ML
1000 INJECTION, SOLUTION INTRAVENOUS
Qty: 0 | Refills: 0 | Status: DISCONTINUED | OUTPATIENT
Start: 2019-03-06 | End: 2019-03-06

## 2019-03-06 RX ORDER — MAGNESIUM HYDROXIDE 400 MG/1
30 TABLET, CHEWABLE ORAL EVERY 12 HOURS
Qty: 0 | Refills: 0 | Status: DISCONTINUED | OUTPATIENT
Start: 2019-03-06 | End: 2019-03-09

## 2019-03-06 RX ORDER — PANTOPRAZOLE SODIUM 20 MG/1
40 TABLET, DELAYED RELEASE ORAL
Qty: 0 | Refills: 0 | Status: DISCONTINUED | OUTPATIENT
Start: 2019-03-06 | End: 2019-03-09

## 2019-03-06 RX ORDER — OXYCODONE HYDROCHLORIDE 5 MG/1
5 TABLET ORAL
Qty: 0 | Refills: 0 | Status: DISCONTINUED | OUTPATIENT
Start: 2019-03-06 | End: 2019-03-09

## 2019-03-06 RX ORDER — ENOXAPARIN SODIUM 100 MG/ML
40 INJECTION SUBCUTANEOUS EVERY 24 HOURS
Qty: 0 | Refills: 0 | Status: DISCONTINUED | OUTPATIENT
Start: 2019-03-07 | End: 2019-03-07

## 2019-03-06 RX ORDER — CEFAZOLIN SODIUM 1 G
2000 VIAL (EA) INJECTION ONCE
Qty: 0 | Refills: 0 | Status: COMPLETED | OUTPATIENT
Start: 2019-03-06 | End: 2019-03-06

## 2019-03-06 RX ORDER — ACETAMINOPHEN 500 MG
975 TABLET ORAL EVERY 6 HOURS
Qty: 0 | Refills: 0 | Status: COMPLETED | OUTPATIENT
Start: 2019-03-06 | End: 2019-03-08

## 2019-03-06 RX ORDER — PREGABALIN 225 MG/1
1000 CAPSULE ORAL DAILY
Qty: 0 | Refills: 0 | Status: DISCONTINUED | OUTPATIENT
Start: 2019-03-06 | End: 2019-03-09

## 2019-03-06 RX ORDER — CEFAZOLIN SODIUM 1 G
2000 VIAL (EA) INJECTION
Qty: 0 | Refills: 0 | Status: COMPLETED | OUTPATIENT
Start: 2019-03-06 | End: 2019-03-07

## 2019-03-06 RX ORDER — TAMSULOSIN HYDROCHLORIDE 0.4 MG/1
0.4 CAPSULE ORAL AT BEDTIME
Qty: 0 | Refills: 0 | Status: DISCONTINUED | OUTPATIENT
Start: 2019-03-06 | End: 2019-03-09

## 2019-03-06 RX ORDER — OXYCODONE HYDROCHLORIDE 5 MG/1
10 TABLET ORAL
Qty: 0 | Refills: 0 | Status: DISCONTINUED | OUTPATIENT
Start: 2019-03-06 | End: 2019-03-09

## 2019-03-06 RX ORDER — CELECOXIB 200 MG/1
200 CAPSULE ORAL ONCE
Qty: 0 | Refills: 0 | Status: COMPLETED | OUTPATIENT
Start: 2019-03-06 | End: 2019-03-06

## 2019-03-06 RX ORDER — FOLIC ACID 0.8 MG
1 TABLET ORAL DAILY
Qty: 0 | Refills: 0 | Status: DISCONTINUED | OUTPATIENT
Start: 2019-03-06 | End: 2019-03-09

## 2019-03-06 RX ADMIN — SODIUM CHLORIDE 3 MILLILITER(S): 9 INJECTION INTRAMUSCULAR; INTRAVENOUS; SUBCUTANEOUS at 23:12

## 2019-03-06 RX ADMIN — HYDROMORPHONE HYDROCHLORIDE 0.5 MILLIGRAM(S): 2 INJECTION INTRAMUSCULAR; INTRAVENOUS; SUBCUTANEOUS at 18:30

## 2019-03-06 RX ADMIN — GABAPENTIN 300 MILLIGRAM(S): 400 CAPSULE ORAL at 23:00

## 2019-03-06 RX ADMIN — Medication 100 MILLIGRAM(S): at 14:52

## 2019-03-06 RX ADMIN — SENNA PLUS 2 TABLET(S): 8.6 TABLET ORAL at 23:03

## 2019-03-06 RX ADMIN — Medication 75 MILLIGRAM(S): at 13:03

## 2019-03-06 RX ADMIN — INSULIN GLARGINE 10 UNIT(S): 100 INJECTION, SOLUTION SUBCUTANEOUS at 23:13

## 2019-03-06 RX ADMIN — TAMSULOSIN HYDROCHLORIDE 0.4 MILLIGRAM(S): 0.4 CAPSULE ORAL at 23:00

## 2019-03-06 RX ADMIN — Medication 100 MILLIGRAM(S): at 23:03

## 2019-03-06 RX ADMIN — ATORVASTATIN CALCIUM 10 MILLIGRAM(S): 80 TABLET, FILM COATED ORAL at 23:00

## 2019-03-06 RX ADMIN — HYDROMORPHONE HYDROCHLORIDE 0.5 MILLIGRAM(S): 2 INJECTION INTRAMUSCULAR; INTRAVENOUS; SUBCUTANEOUS at 18:19

## 2019-03-06 RX ADMIN — HYDROMORPHONE HYDROCHLORIDE 0.5 MILLIGRAM(S): 2 INJECTION INTRAMUSCULAR; INTRAVENOUS; SUBCUTANEOUS at 18:45

## 2019-03-06 RX ADMIN — SODIUM CHLORIDE 100 MILLILITER(S): 9 INJECTION, SOLUTION INTRAVENOUS at 18:55

## 2019-03-06 RX ADMIN — SODIUM CHLORIDE 80 MILLILITER(S): 9 INJECTION, SOLUTION INTRAVENOUS at 23:58

## 2019-03-06 RX ADMIN — Medication 1 MILLIGRAM(S): at 23:03

## 2019-03-06 RX ADMIN — CELECOXIB 200 MILLIGRAM(S): 200 CAPSULE ORAL at 13:03

## 2019-03-06 RX ADMIN — Medication 10 MILLIGRAM(S): at 18:44

## 2019-03-06 NOTE — PROGRESS NOTE ADULT - SUBJECTIVE AND OBJECTIVE BOX
ELIAS [id number]    63yMale  Spinal stenosis of lumbar region without neurogenic claudication  Family history of colon cancer (Father)  Family history of gastric cancer (Mother)  Nonischemic cardiomyopathy  HLD (hyperlipidemia)  DM (diabetes mellitus)  Hypertension  Lumbar stenosis with neurogenic claudication  Lumbar stenosis with neurogenic claudication  Need for prophylactic measure  Lumbar stenosis  Nonischemic cardiomyopathy  Hypertension  DM (diabetes mellitus)  Lumbar laminectomy at multiple levels  H/O excision of mass  AICD (automatic cardioverter/defibrillator) present  SPINAL STENOSIS, LUMBAR REGION    STATUS POST:  lumbar laminectomy L2,L3, L4 for lumbar stenosis with neurogenic claudication  SUBJECTIVE: Patient seen and examined doing well    Back Pain controlled, lower extremity pain resolving    OBJECTIVE:   T(C): 36.5 (03-06-19 @ 10:49), Max: 36.5 (03-06-19 @ 10:49)  HR: 75 (03-06-19 @ 10:49) (75 - 75)  BP: 125/73 (03-06-19 @ 10:49) (125/73 - 125/73)  RR: 16 (03-06-19 @ 10:49) (16 - 16)  SpO2: 100% (03-06-19 @ 10:49) (100% - 100%)   Constitutional :Pleasant in no acute distress  Psych:A&Ox3  Abdominal: soft and supple non distended  Lymphatics: no pretibial pitting edema  Spine:          Dressing:  clean/dry/intact                            HV drain in place, patent.  Rogers dressing operating well               Sensation:           [x ] Upper extremity          grossly intact manually         [x ] Lower extremity           grossly intact manually                               Motor:         [x ] Lower extremity                     HF(l2)   KE(l3)    TA(l4)   EHL(l5)  GS(s1)                                                 R        5/5        5/5        5/5       5/5         5/5                                               L         5/5        5/5       5/5       5/5          5/5                                                        [x] warm well perfused; capillary refill <3 seconds                A/P : 63yMale   S/P lumbar laminectomy as above  POD#0  -    Pain control  -    DVT ppx: [ x]SCDs[ x] Pharmacolgic  lovenox until ambulating freely  -    Periop abx:  Ancef [x ]  Vanco  -    Check AM labs    -    Monitor Drain Output, can discontinue drain when output equal or less than 10 cc/hr/12 hr.  change dressing when drain pulled and as needed, honeycomb dressing.  Consider ROGERS dressing if incisional area is exudative or large.   -    Resume home meds as appropriate  -PT/OT WBAT, balance and gait  - LSO  is not mandatory, PA/PT/OT team can evaluate if patient needs additional external support can consider ordering LSO  -medical follow up- Dr Gonzales  - probable home 48-72 hours  - patient with remote hx ETOH, on light CIWA scale and ativan tid ordered. ELIAS [id number]    63yMale  Spinal stenosis of lumbar region without neurogenic claudication  Family history of colon cancer (Father)  Family history of gastric cancer (Mother)  Nonischemic cardiomyopathy  HLD (hyperlipidemia)  DM (diabetes mellitus)  Hypertension  Lumbar stenosis with neurogenic claudication  Lumbar stenosis with neurogenic claudication  Need for prophylactic measure  Lumbar stenosis  Nonischemic cardiomyopathy  Hypertension  DM (diabetes mellitus)  Lumbar laminectomy at multiple levels  H/O excision of mass  AICD (automatic cardioverter/defibrillator) present  SPINAL STENOSIS, LUMBAR REGION    STATUS POST:  lumbar laminectomy L2,L3, L4 for lumbar stenosis with neurogenic claudication  SUBJECTIVE: Patient seen and examined doing well    Back Pain controlled, lower extremity pain resolving    OBJECTIVE:   T(C): 36.5 (03-06-19 @ 10:49), Max: 36.5 (03-06-19 @ 10:49)  HR: 75 (03-06-19 @ 10:49) (75 - 75)  BP: 125/73 (03-06-19 @ 10:49) (125/73 - 125/73)  RR: 16 (03-06-19 @ 10:49) (16 - 16)  SpO2: 100% (03-06-19 @ 10:49) (100% - 100%)   Constitutional :Pleasant in no acute distress  Psych:A&Ox3  Abdominal: soft and supple non distended  Lymphatics: no pretibial pitting edema  Spine:          Dressing:  clean/dry/intact                            HV drain in place, patent.  Rogers dressing operating well               Sensation:           [x ] Upper extremity          grossly intact manually         [x ] Lower extremity           grossly intact manually                               Motor:         [x ] Lower extremity                     HF(l2)   KE(l3)    TA(l4)   EHL(l5)  GS(s1)                                                 R        5/5        5/5        5/5       5/5         5/5                                               L         5/5        5/5       5/5       5/5          5/5                                                        [x] warm well perfused; capillary refill <3 seconds                A/P : 63yMale   S/P lumbar laminectomy as above  POD#0  -    Pain control- patient comes in on percocet 5/325.  consider discharge on oxycodone 10 mg, muscle relaxers.  If pain difficult to control, consider pain management consult.   -    DVT ppx: [ x]SCDs[ x] Pharmacolgic  lovenox until ambulating freely  -    Periop abx:  Ancef [x ]  Vanco  -    Check AM labs    -    Monitor Drain Output, can discontinue drain when output equal or less than 10 cc/hr/12 hr.  change dressing when drain pulled and as needed, honeycomb dressing.  Consider ROGERS dressing if incisional area is exudative or large.   -    Resume home meds as appropriate  -PT/OT WBAT, balance and gait  - LSO  is not mandatory, PA/PT/OT team can evaluate if patient needs additional external support can consider ordering LSO  -medical follow up- Dr Gonzales  - probable home 48-72 hours  - patient with remote hx ETOH, on light CIWA scale and ativan tid ordered.  check LFT.

## 2019-03-06 NOTE — CONSULT NOTE ADULT - SUBJECTIVE AND OBJECTIVE BOX
HPI:  63 year old male with h/o HTN, HLD, nonischemic cardiomyopathy s/p ACID present with lower back and  chronic neurogenic claudication that is worsening. He states he can only walk for few minutes before pain and discomfort worsens which forces him to stop, sit, and use cane occasionally.  He is now schedule for laminectomy. Post op    Home Medications:   · 	ALPRAZOLAM .5 MG TABS: Last Dose Taken:  , 1  orally 3 times a day, As Needed  · 	ATORVASTATIN CALCIUM 10 MG TABS: Last Dose Taken:  , 1  orally once a day  · 	CARVEDILOL 25 MG TABS: Last Dose Taken:  , 1  orally 2 times a day  · 	LISINOPRIL 20 MG TABS: Last Dose Taken:  , 1  orally once a day  · 	METFORMIN HYDROCHLORIDE 500 MG TABS: Last Dose Taken:  , 1  orally 2 times a day          · 	OXYCOD/APAP  TAB 5-325MG: Last Dose Taken:  , 1  orally every 6 hours, As Needed      PAST MEDICAL & SURGICAL HISTORY:  Nonischemic cardiomyopathy: s/p biventricular AICD March 2014  HLD (hyperlipidemia)  DM (diabetes mellitus)  Hypertension  H/O excision of mass: back 2012  AICD (automatic cardioverter/defibrillator) present    atorvastatin 10 milliGRAM(s) Oral at bedtime  BUpivacaine liposome 1.3% Injectable 20 milliLiter(s) Local Injection once  ceFAZolin   IVPB 2000 milliGRAM(s) IV Intermittent <User Schedule>  cyanocobalamin 1000 MICROGram(s) Oral daily  folic acid 1 milliGRAM(s) Oral daily  hydrALAZINE Injectable 10 milliGRAM(s) IV Push every 6 hours PRN  HYDROmorphone  Injectable 0.5 milliGRAM(s) IV Push every 10 minutes PRN  lactated ringers. 1000 milliLiter(s) IV Continuous <Continuous>  multivitamin 1 Tablet(s) Oral daily  ondansetron Injectable 4 milliGRAM(s) IV Push once PRN    MEDICATIONS  (STANDING):  atorvastatin 10 milliGRAM(s) Oral at bedtime  BUpivacaine liposome 1.3% Injectable 20 milliLiter(s) Local Injection once  ceFAZolin   IVPB 2000 milliGRAM(s) IV Intermittent <User Schedule>  cyanocobalamin 1000 MICROGram(s) Oral daily  folic acid 1 milliGRAM(s) Oral daily  lactated ringers. 1000 milliLiter(s) (100 mL/Hr) IV Continuous <Continuous>  multivitamin 1 Tablet(s) Oral daily    MEDICATIONS  (PRN):  hydrALAZINE Injectable 10 milliGRAM(s) IV Push every 6 hours PRN sbp > 170  HYDROmorphone  Injectable 0.5 milliGRAM(s) IV Push every 10 minutes PRN Moderate Pain (4 - 6)  ondansetron Injectable 4 milliGRAM(s) IV Push once PRN Nausea and/or Vomiting      Allergies    No Known Allergies    Intolerances        SOCIAL HISTORY:  NO S/ /IVDU  +occassion beer     FAMILY HISTORY:  Family history of colon cancer (Father)  Family history of gastric cancer (Mother)      LABS:    03-06    139  |  104  |  9.0  ----------------------------<  100  4.2   |  23.0  |  0.71    Ca    8.9      06 Mar 2019 18:23    TPro  6.3<L>  /  Alb  3.3  /  TBili  0.9  /  DBili  x   /  AST  46<H>  /  ALT  49<H>  /  AlkPhos  67  03-06            ROS  - Headache  - Neck Stiffness  - Chest Pain  - SOB  - Abd pain  - Pelvic Pain  - Leg Pain  + Back pain      Vital Signs Last 24 Hrs  T(C): 36.8 (06 Mar 2019 17:37), Max: 36.8 (06 Mar 2019 17:37)  T(F): 98.3 (06 Mar 2019 17:37), Max: 98.3 (06 Mar 2019 17:37)  HR: 82 (06 Mar 2019 19:00) (70 - 85)  BP: 132/65 (06 Mar 2019 19:00) (125/73 - 178/82)  BP(mean): --  RR: 14 (06 Mar 2019 19:00) (14 - 17)  SpO2: 100% (06 Mar 2019 19:00) (100% - 100%)    HEENT: PEARLA  Neck: Supple  Cardio: S1 S2 No Murmur  Pulm: CTA No Rales or Ronchi  Abd: Soft NT ND BS+  Rectal - refused  Back - Incision bandage clean   Ext: No DCT  Skin: No Rash  Neuro: Awake Pleasant      Back Surgery - Pain control ambulation  Nonischemic cardiomyopathy: s/p biventricular AICD  - cont caredolol  HLD (hyperlipidemia) - statin  DM (diabetes mellitus) - SS, lantus  Hypertension - cont meds with parameters HPI:  63 year old male with h/o HTN, HLD, nonischemic cardiomyopathy s/p ACID present with lower back and  chronic neurogenic claudication that is worsening. He states he can only walk for few minutes before pain and discomfort worsens which forces him to stop, sit, and use cane occasionally.  He is now schedule for laminectomy. Post op    Home Medications:   · 	ALPRAZOLAM .5 MG TABS: Last Dose Taken:  , 1  orally 3 times a day, As Needed  · 	ATORVASTATIN CALCIUM 10 MG TABS: Last Dose Taken:  , 1  orally once a day  · 	CARVEDILOL 25 MG TABS: Last Dose Taken:  , 1  orally 2 times a day  · 	LISINOPRIL 20 MG TABS: Last Dose Taken:  , 1  orally once a day  · 	METFORMIN HYDROCHLORIDE 500 MG TABS: Last Dose Taken:  , 1  orally 2 times a day          · 	OXYCOD/APAP  TAB 5-325MG: Last Dose Taken:  , 1  orally every 6 hours, As Needed      PAST MEDICAL & SURGICAL HISTORY:  Nonischemic cardiomyopathy: s/p biventricular AICD March 2014  HLD (hyperlipidemia)  DM (diabetes mellitus)  Hypertension  H/O excision of mass: back 2012  AICD (automatic cardioverter/defibrillator) present    atorvastatin 10 milliGRAM(s) Oral at bedtime  BUpivacaine liposome 1.3% Injectable 20 milliLiter(s) Local Injection once  ceFAZolin   IVPB 2000 milliGRAM(s) IV Intermittent <User Schedule>  cyanocobalamin 1000 MICROGram(s) Oral daily  folic acid 1 milliGRAM(s) Oral daily  hydrALAZINE Injectable 10 milliGRAM(s) IV Push every 6 hours PRN  HYDROmorphone  Injectable 0.5 milliGRAM(s) IV Push every 10 minutes PRN  lactated ringers. 1000 milliLiter(s) IV Continuous <Continuous>  multivitamin 1 Tablet(s) Oral daily  ondansetron Injectable 4 milliGRAM(s) IV Push once PRN    MEDICATIONS  (STANDING):  atorvastatin 10 milliGRAM(s) Oral at bedtime  BUpivacaine liposome 1.3% Injectable 20 milliLiter(s) Local Injection once  ceFAZolin   IVPB 2000 milliGRAM(s) IV Intermittent <User Schedule>  cyanocobalamin 1000 MICROGram(s) Oral daily  folic acid 1 milliGRAM(s) Oral daily  lactated ringers. 1000 milliLiter(s) (100 mL/Hr) IV Continuous <Continuous>  multivitamin 1 Tablet(s) Oral daily    MEDICATIONS  (PRN):  hydrALAZINE Injectable 10 milliGRAM(s) IV Push every 6 hours PRN sbp > 170  HYDROmorphone  Injectable 0.5 milliGRAM(s) IV Push every 10 minutes PRN Moderate Pain (4 - 6)  ondansetron Injectable 4 milliGRAM(s) IV Push once PRN Nausea and/or Vomiting      Allergies    No Known Allergies    Intolerances        SOCIAL HISTORY:  NO S/ /IVDU  +occassion beer     FAMILY HISTORY:  Family history of colon cancer (Father)  Family history of gastric cancer (Mother)      LABS:    03-06    139  |  104  |  9.0  ----------------------------<  100  4.2   |  23.0  |  0.71    Ca    8.9      06 Mar 2019 18:23    TPro  6.3<L>  /  Alb  3.3  /  TBili  0.9  /  DBili  x   /  AST  46<H>  /  ALT  49<H>  /  AlkPhos  67  03-06            ROS  - Headache  - Neck Stiffness  - Chest Pain  - SOB  - Abd pain  - Pelvic Pain  - Leg Pain  + Back pain      Vital Signs Last 24 Hrs  T(C): 36.8 (06 Mar 2019 17:37), Max: 36.8 (06 Mar 2019 17:37)  T(F): 98.3 (06 Mar 2019 17:37), Max: 98.3 (06 Mar 2019 17:37)  HR: 82 (06 Mar 2019 19:00) (70 - 85)  BP: 132/65 (06 Mar 2019 19:00) (125/73 - 178/82)  BP(mean): --  RR: 14 (06 Mar 2019 19:00) (14 - 17)  SpO2: 100% (06 Mar 2019 19:00) (100% - 100%)    HEENT: PEARLA  Neck: Supple  Cardio: S1 S2 No Murmur  Pulm: CTA No Rales or Ronchi  Abd: Soft NT ND BS+  Rectal - refused  Back - Incision bandage clean   Ext: No DCT  Skin: No Rash  Neuro: Awake Pleasant      Back Surgery - Pain control ambulation  Nonischemic cardiomyopathy: s/p biventricular AICD  - cont caredolol  HLD (hyperlipidemia) - statin  DM (diabetes mellitus) - SS, lantus  Hypertension - cont meds with parameters   Urinary Frequency - increase risk for retension, Bladder scans ordered

## 2019-03-06 NOTE — BRIEF OPERATIVE NOTE - PROCEDURE
<<-----Click on this checkbox to enter Procedure Lumbar laminectomy at multiple levels  03/06/2019    Active  DBRANBRISEYDASTEIN

## 2019-03-07 DIAGNOSIS — Z71.89 OTHER SPECIFIED COUNSELING: ICD-10-CM

## 2019-03-07 PROBLEM — E78.5 HYPERLIPIDEMIA, UNSPECIFIED: Chronic | Status: ACTIVE | Noted: 2019-02-19

## 2019-03-07 PROBLEM — I10 ESSENTIAL (PRIMARY) HYPERTENSION: Chronic | Status: ACTIVE | Noted: 2019-02-19

## 2019-03-07 PROBLEM — E11.9 TYPE 2 DIABETES MELLITUS WITHOUT COMPLICATIONS: Chronic | Status: ACTIVE | Noted: 2019-02-19

## 2019-03-07 PROBLEM — I42.8 OTHER CARDIOMYOPATHIES: Chronic | Status: ACTIVE | Noted: 2019-02-19

## 2019-03-07 LAB
ANION GAP SERPL CALC-SCNC: 12 MMOL/L — SIGNIFICANT CHANGE UP (ref 5–17)
BUN SERPL-MCNC: 11 MG/DL — SIGNIFICANT CHANGE UP (ref 8–20)
CALCIUM SERPL-MCNC: 8.5 MG/DL — LOW (ref 8.6–10.2)
CHLORIDE SERPL-SCNC: 104 MMOL/L — SIGNIFICANT CHANGE UP (ref 98–107)
CO2 SERPL-SCNC: 23 MMOL/L — SIGNIFICANT CHANGE UP (ref 22–29)
CREAT SERPL-MCNC: 0.61 MG/DL — SIGNIFICANT CHANGE UP (ref 0.5–1.3)
EOSINOPHIL # BLD AUTO: 0 K/UL — SIGNIFICANT CHANGE UP (ref 0–0.5)
EOSINOPHIL NFR BLD AUTO: 0.4 % — SIGNIFICANT CHANGE UP (ref 0–5)
GLUCOSE BLDC GLUCOMTR-MCNC: 142 MG/DL — HIGH (ref 70–99)
GLUCOSE BLDC GLUCOMTR-MCNC: 170 MG/DL — HIGH (ref 70–99)
GLUCOSE BLDC GLUCOMTR-MCNC: 174 MG/DL — HIGH (ref 70–99)
GLUCOSE BLDC GLUCOMTR-MCNC: 192 MG/DL — HIGH (ref 70–99)
GLUCOSE SERPL-MCNC: 196 MG/DL — HIGH (ref 70–115)
HCT VFR BLD CALC: 38.6 % — LOW (ref 42–52)
HGB BLD-MCNC: 13.7 G/DL — LOW (ref 14–18)
LYMPHOCYTES # BLD AUTO: 0.3 K/UL — LOW (ref 1–4.8)
LYMPHOCYTES # BLD AUTO: 6 % — LOW (ref 20–55)
MCHC RBC-ENTMCNC: 35.4 PG — HIGH (ref 27–31)
MCHC RBC-ENTMCNC: 35.5 G/DL — SIGNIFICANT CHANGE UP (ref 32–36)
MCV RBC AUTO: 99.7 FL — HIGH (ref 80–94)
MONOCYTES # BLD AUTO: 0.2 K/UL — SIGNIFICANT CHANGE UP (ref 0–0.8)
MONOCYTES NFR BLD AUTO: 3.9 % — SIGNIFICANT CHANGE UP (ref 3–10)
NEUTROPHILS # BLD AUTO: 4.3 K/UL — SIGNIFICANT CHANGE UP (ref 1.8–8)
NEUTROPHILS NFR BLD AUTO: 89.5 % — HIGH (ref 37–73)
PLATELET # BLD AUTO: 62 K/UL — LOW (ref 150–400)
POTASSIUM SERPL-MCNC: 4.1 MMOL/L — SIGNIFICANT CHANGE UP (ref 3.5–5.3)
POTASSIUM SERPL-SCNC: 4.1 MMOL/L — SIGNIFICANT CHANGE UP (ref 3.5–5.3)
RBC # BLD: 3.87 M/UL — LOW (ref 4.6–6.2)
RBC # FLD: 12.6 % — SIGNIFICANT CHANGE UP (ref 11–15.6)
SODIUM SERPL-SCNC: 139 MMOL/L — SIGNIFICANT CHANGE UP (ref 135–145)
WBC # BLD: 4.8 K/UL — SIGNIFICANT CHANGE UP (ref 4.8–10.8)
WBC # FLD AUTO: 4.8 K/UL — SIGNIFICANT CHANGE UP (ref 4.8–10.8)

## 2019-03-07 RX ORDER — INSULIN LISPRO 100/ML
VIAL (ML) SUBCUTANEOUS
Qty: 0 | Refills: 0 | Status: DISCONTINUED | OUTPATIENT
Start: 2019-03-07 | End: 2019-03-09

## 2019-03-07 RX ORDER — INSULIN LISPRO 100/ML
3 VIAL (ML) SUBCUTANEOUS
Qty: 0 | Refills: 0 | Status: DISCONTINUED | OUTPATIENT
Start: 2019-03-07 | End: 2019-03-09

## 2019-03-07 RX ADMIN — Medication 3 UNIT(S): at 13:17

## 2019-03-07 RX ADMIN — Medication 100 MILLIGRAM(S): at 05:24

## 2019-03-07 RX ADMIN — LISINOPRIL 20 MILLIGRAM(S): 2.5 TABLET ORAL at 05:23

## 2019-03-07 RX ADMIN — CARVEDILOL PHOSPHATE 25 MILLIGRAM(S): 80 CAPSULE, EXTENDED RELEASE ORAL at 05:23

## 2019-03-07 RX ADMIN — Medication 2: at 09:34

## 2019-03-07 RX ADMIN — SODIUM CHLORIDE 3 MILLILITER(S): 9 INJECTION INTRAMUSCULAR; INTRAVENOUS; SUBCUTANEOUS at 23:17

## 2019-03-07 RX ADMIN — INSULIN GLARGINE 10 UNIT(S): 100 INJECTION, SOLUTION SUBCUTANEOUS at 23:14

## 2019-03-07 RX ADMIN — Medication 1 MILLIGRAM(S): at 23:18

## 2019-03-07 RX ADMIN — PANTOPRAZOLE SODIUM 40 MILLIGRAM(S): 20 TABLET, DELAYED RELEASE ORAL at 05:25

## 2019-03-07 RX ADMIN — CARVEDILOL PHOSPHATE 25 MILLIGRAM(S): 80 CAPSULE, EXTENDED RELEASE ORAL at 18:04

## 2019-03-07 RX ADMIN — SODIUM CHLORIDE 3 MILLILITER(S): 9 INJECTION INTRAMUSCULAR; INTRAVENOUS; SUBCUTANEOUS at 16:34

## 2019-03-07 RX ADMIN — Medication 3 UNIT(S): at 18:12

## 2019-03-07 RX ADMIN — OXYCODONE HYDROCHLORIDE 10 MILLIGRAM(S): 5 TABLET ORAL at 21:46

## 2019-03-07 RX ADMIN — Medication 975 MILLIGRAM(S): at 00:54

## 2019-03-07 RX ADMIN — Medication 1 TABLET(S): at 13:01

## 2019-03-07 RX ADMIN — Medication 975 MILLIGRAM(S): at 01:00

## 2019-03-07 RX ADMIN — Medication 1 MILLIGRAM(S): at 13:02

## 2019-03-07 RX ADMIN — Medication 975 MILLIGRAM(S): at 14:01

## 2019-03-07 RX ADMIN — Medication 975 MILLIGRAM(S): at 13:02

## 2019-03-07 RX ADMIN — Medication 1 MILLIGRAM(S): at 05:23

## 2019-03-07 RX ADMIN — SODIUM CHLORIDE 3 MILLILITER(S): 9 INJECTION INTRAMUSCULAR; INTRAVENOUS; SUBCUTANEOUS at 05:25

## 2019-03-07 RX ADMIN — Medication 975 MILLIGRAM(S): at 05:23

## 2019-03-07 RX ADMIN — Medication 975 MILLIGRAM(S): at 19:04

## 2019-03-07 RX ADMIN — ATORVASTATIN CALCIUM 10 MILLIGRAM(S): 80 TABLET, FILM COATED ORAL at 23:14

## 2019-03-07 RX ADMIN — OXYCODONE HYDROCHLORIDE 10 MILLIGRAM(S): 5 TABLET ORAL at 20:46

## 2019-03-07 RX ADMIN — Medication 975 MILLIGRAM(S): at 18:04

## 2019-03-07 RX ADMIN — GABAPENTIN 300 MILLIGRAM(S): 400 CAPSULE ORAL at 05:23

## 2019-03-07 RX ADMIN — TAMSULOSIN HYDROCHLORIDE 0.4 MILLIGRAM(S): 0.4 CAPSULE ORAL at 23:18

## 2019-03-07 RX ADMIN — Medication 975 MILLIGRAM(S): at 06:20

## 2019-03-07 RX ADMIN — ENOXAPARIN SODIUM 40 MILLIGRAM(S): 100 INJECTION SUBCUTANEOUS at 05:26

## 2019-03-07 RX ADMIN — GABAPENTIN 300 MILLIGRAM(S): 400 CAPSULE ORAL at 23:14

## 2019-03-07 RX ADMIN — PREGABALIN 1000 MICROGRAM(S): 225 CAPSULE ORAL at 13:02

## 2019-03-07 RX ADMIN — Medication 1 MILLIGRAM(S): at 16:40

## 2019-03-07 RX ADMIN — SENNA PLUS 2 TABLET(S): 8.6 TABLET ORAL at 23:18

## 2019-03-07 RX ADMIN — GABAPENTIN 300 MILLIGRAM(S): 400 CAPSULE ORAL at 16:40

## 2019-03-07 NOTE — DISCHARGE NOTE ADULT - NS AS ACTIVITY OBS
Stairs allowed/Do not make important decisions/Do not drive or operate machinery/No Heavy lifting/straining/Showering allowed/Walking-Indoors allowed

## 2019-03-07 NOTE — DISCHARGE NOTE ADULT - MEDICATION SUMMARY - MEDICATIONS TO STOP TAKING
I will STOP taking the medications listed below when I get home from the hospital:    OXYCOD/APAP  TAB 5-325MG  -- 1  by mouth every 6 hours, As Needed    mupirocin 2% topical ointment  -- Apply on skin IN BOTH NOSTRILS TWICE A DAY FOR 5 DAYS  -- For external use only.

## 2019-03-07 NOTE — DISCHARGE NOTE ADULT - CARE PROVIDER_API CALL
Jam Chen (DO)  Orthopaedic Surgery  [address], [address]  [place] [address]  Phone: [phone]  Fax: [phone]  Follow Up Time:

## 2019-03-07 NOTE — DISCHARGE NOTE ADULT - HOSPITAL COURSE
Patient was admitted for elective posterior lumbar laminectomy  on 3/6/19 for failed conservative treatment of persistent lower back pain, The hospital post operative course was uneventful.  The surgical dressing was changed and the drain was removed uneventfully.   PT/OT worked with patient for gait training.  Pain was now adequately controlled and patient was discharged home in stable condition.  Chronic medical conditions were treated during the hospital stay.

## 2019-03-07 NOTE — PHYSICAL THERAPY INITIAL EVALUATION ADULT - ADDITIONAL COMMENTS
Pt lives in an apartment with 3-4 steps to enter with b/l HR and no steps inside. Owns a cane but doesn't use it. Drives. States he has friends nearby if he needs assistance.

## 2019-03-07 NOTE — PROGRESS NOTE ADULT - ATTENDING COMMENTS
Patient was evaluated in the morning of 37 2019. Patient states his neurogenic claudication signs and symptoms have completely resolved. Patient is up ambulating to the bathroom without complaints of neurogenic claudication. His dressing did have some saturation which is expected from our intraoperative experience. Patient will continue his PT OT will monitor her drain output etc. We'll prepare for discharge in approximately 24-48 hours

## 2019-03-07 NOTE — DISCHARGE NOTE ADULT - PLAN OF CARE
improve pain and function Follow-up with Dr. Chen within 7-10 days. Dressing change daily until dry, then leave dressing in place until office follow-up. Pain medications as indicated and titrated to patient's needs. Patient will begin aspirin 325mg twice daily for 4 weeks post-operatively for clot prevention.  Ambulate with assistance of walker. Contact office if the wound becomes red, opens or discharge increases. Follow-up with Dr. Chen within 7-10 days. Dressing change daily until dry, then leave dressing in place until office follow-up. Pain medications as indicated and titrated to patient's needs.  Ambulate with assistance of walker. Contact office if the wound becomes red, opens or discharge increases. Follow-up with Dr. Chen within 7-10 days. Dressing leave on unless becomes soiled or has drainage noted. Pain medications as indicated and titrated to patient's needs.  Contact office if the wound becomes red, opens or discharge increases. No physical therapy until re evaluation by Dr Chen.

## 2019-03-07 NOTE — DISCHARGE NOTE ADULT - MEDICATION SUMMARY - MEDICATIONS TO TAKE
I will START or STAY ON the medications listed below when I get home from the hospital:    oxyCODONE 5 mg oral tablet  -- 1-2 tab(s) by mouth every 4 to 6 hours, As Needed Pain MDD:8  -- Indication: For pain    LISINOPRIL 20 MG TABS  -- 1  by mouth once a day  -- Indication: For Home med    METFORMIN HYDROCHLORIDE 500 MG TABS  -- 1  by mouth 2 times a day  -- Indication: For Home med    ATORVASTATIN CALCIUM 10 MG TABS  -- 1  by mouth once a day  -- Indication: For Home med    ALPRAZOLAM .5 MG TABS  -- 1  by mouth 3 times a day, As Needed  -- Indication: For Home med    CARVEDILOL 25 MG TABS  -- 1  by mouth 2 times a day  -- Indication: For Home med    methocarbamol 500 mg oral tablet  -- 1 tab(s) by mouth every 6 hours, As needed, Back Spasms  -- Indication: For muscle relaxor

## 2019-03-07 NOTE — DISCHARGE NOTE ADULT - PATIENT PORTAL LINK FT
You can access the [facility], offered by [facility], by registering with the following website: [email or web address]

## 2019-03-07 NOTE — OCCUPATIONAL THERAPY INITIAL EVALUATION ADULT - SENSORY TESTS
pt with +bilateral pedal pulses; pt with +capillary refill in bilateral toes; pt reports "numbness" to bilateral UEs/LEs that has been chronic for a while prior to admission

## 2019-03-07 NOTE — OCCUPATIONAL THERAPY INITIAL EVALUATION ADULT - ADDITIONAL COMMENTS
Pt lives in single level apartment with 4 GAURI and no steps inside; bedroom and bathroom are on main level. Bathroom has shower stall with curtains. Pt owns a cane. Pt is right handed. Pt drives. Pt reports he has friends that are local and can assist upon discharge.

## 2019-03-07 NOTE — PROGRESS NOTE ADULT - SUBJECTIVE AND OBJECTIVE BOX
ELIAS [id number]    63yMale  Spinal stenosis of lumbar region without neurogenic claudication  Family history of colon cancer (Father)  Family history of gastric cancer (Mother)  Nonischemic cardiomyopathy  HLD (hyperlipidemia)  DM (diabetes mellitus)  Hypertension  Lumbar stenosis with neurogenic claudication  Lumbar stenosis with neurogenic claudication  Need for prophylactic measure  Lumbar stenosis  Nonischemic cardiomyopathy  Hypertension  DM (diabetes mellitus)  Lumbar laminectomy at multiple levels  H/O excision of mass  AICD (automatic cardioverter/defibrillator) present  SPINAL STENOSIS, LUMBAR REGION    STATUS POST:  lumbar laminectomy L2,L3, L4 for lumbar stenosis with neurogenic claudication  SUBJECTIVE: Patient seen and examined doing well, up and ambulating    Back Pain controlled, lower extremity pain resolving    OBJECTIVE:   Vital Signs Last 24 Hrs  T(C): 36.7 (07 Mar 2019 04:42), Max: 37 (06 Mar 2019 20:00)  T(F): 98.1 (07 Mar 2019 04:42), Max: 98.6 (06 Mar 2019 20:00)  HR: 93 (07 Mar 2019 04:42) (70 - 93)  BP: 154/85 (07 Mar 2019 04:42) (125/73 - 178/82)  BP(mean): --  RR: 19 (07 Mar 2019 04:42) (12 - 19)  SpO2: 97% (07 Mar 2019 04:42) (97% - 100%)                          13.7   4.8   )-----------( x        ( 07 Mar 2019 06:05 )             38.6     03-07    139  |  104  |  11.0  ----------------------------<  196<H>  4.1   |  23.0  |  0.61    Ca    8.5<L>      07 Mar 2019 06:05     Constitutional :Pleasant in no acute distress  Psych: A&Ox3  Abdominal: soft and supple non distended  Lymphatics: no pretibial pitting edema  Spine:          Dressing:  saturated with bloody drainage, Dressing removed, steri strips in place, New bulky sterile dressing applied                            HV drain in place, 255 cc outptu recorded overnight, tubing disconnected this AM                            tubing reconnected, suction re-established         Sensation:           [x ] Upper extremity grossly intact manually         [x ] Lower extremity grossly intact manually                               Motor:         [x ] Lower extremity                     HF(l2)   KE(l3)    TA(l4)   EHL(l5)  GS(s1)                                                 R        5/5        5/5        5/5       5/5         5/5                                               L         5/5        5/5       5/5       5/5          5/5                                                        [x] warm well perfused; capillary refill <3 seconds                A/P : 63yMale   S/P lumbar laminectomy as above  POD#1  -    Pain control- patient comes in on percocet 5/325.  consider discharge on oxycodone 10 mg, muscle relaxers. If pain difficult to control, consider pain management consult.   -    DVT ppx: [ x]SCDs[ x] Pharmacologic lovenox until ambulating freely  -    Check AM labs    -    Continue to Monitor Drain Output, can discontinue drain when output equal or less than 10 cc/hr/12 hr. Change dressing when drain pulled and as needed, honeycomb dressing.  Consider ROGERS dressing if incisional area is exudative or large.   -    PT/OT WBAT, balance and gait  -    LSO is not mandatory, PA/PT/OT team can evaluate if patient needs additional external support can consider ordering LSO  -    medical follow up- Dr Gonzales  -    probable home 48-72 hours  -    patient with remote hx ETOH, on light CIWA scale and ativan tid ordered. ELIAS [id number]    63yMale  Spinal stenosis of lumbar region without neurogenic claudication  Family history of colon cancer (Father)  Family history of gastric cancer (Mother)  Nonischemic cardiomyopathy  HLD (hyperlipidemia)  DM (diabetes mellitus)  Hypertension  Lumbar stenosis with neurogenic claudication  Lumbar stenosis with neurogenic claudication  Need for prophylactic measure  Lumbar stenosis  Nonischemic cardiomyopathy  Hypertension  DM (diabetes mellitus)  Lumbar laminectomy at multiple levels  H/O excision of mass  AICD (automatic cardioverter/defibrillator) present  SPINAL STENOSIS, LUMBAR REGION    STATUS POST:  lumbar laminectomy L2,L3, L4 for lumbar stenosis with neurogenic claudication  SUBJECTIVE: Patient seen and examined doing well, up and ambulating    Back Pain controlled, lower extremity pain resolving    OBJECTIVE:   Vital Signs Last 24 Hrs  T(C): 36.7 (07 Mar 2019 04:42), Max: 37 (06 Mar 2019 20:00)  T(F): 98.1 (07 Mar 2019 04:42), Max: 98.6 (06 Mar 2019 20:00)  HR: 93 (07 Mar 2019 04:42) (70 - 93)  BP: 154/85 (07 Mar 2019 04:42) (125/73 - 178/82)  BP(mean): --  RR: 19 (07 Mar 2019 04:42) (12 - 19)  SpO2: 97% (07 Mar 2019 04:42) (97% - 100%)                          13.7   4.8   )-----------( x        ( 07 Mar 2019 06:05 )             38.6     03-07    139  |  104  |  11.0  ----------------------------<  196<H>  4.1   |  23.0  |  0.61    Ca    8.5<L>      07 Mar 2019 06:05     Constitutional :Pleasant in no acute distress  Psych: A&Ox3  Abdominal: soft and supple non distended  Lymphatics: no pretibial pitting edema  Spine:          Dressing:  saturated with bloody drainage, Dressing removed, steri strips in place, New bulky sterile dressing applied                            HV drain in place, 255 cc outptu recorded overnight, tubing disconnected this AM                            tubing reconnected, suction re-established         Sensation:           [x ] Upper extremity grossly intact manually         [x ] Lower extremity grossly intact manually                               Motor:         [x ] Lower extremity                     HF(l2)   KE(l3)    TA(l4)   EHL(l5)  GS(s1)                                                 R        5/5        5/5        5/5       5/5         5/5                                               L         5/5        5/5       5/5       5/5          5/5                                                        [x] warm well perfused; capillary refill <3 seconds                A/P : 63yMale   S/P lumbar laminectomy as above  POD#1  -    Pain control- patient comes in on percocet 5/325.  consider discharge on oxycodone 10 mg, muscle relaxers. If pain difficult to control, consider pain management consult.   -    DVT ppx: [ x]SCDs.   Pharmacologic lovenox discontinued today as patient is ambulating freely and has hx thrombocytopenia, as well as saturated dressings.  Will refrain from ASA as patient has had recent NSAID induced hematuria in the recent past.  DVTP will be SCD and early ambulation.    -    Check AM labs    -    Continue to Monitor Drain Output, can discontinue drain when output equal or less than 10 cc/hr/12 hr. Change dressing when drain pulled and as needed, honeycomb dressing.  Consider ROGERS dressing if incisional area is exudative or large.   -    PT/OT WBAT, balance and gait  -    LSO is not mandatory, PA/PT/OT team can evaluate if patient needs additional external support can consider ordering LSO  -    medical follow up- Dr Gonzales  -    probable home 48-72 hours  -    patient with remote hx ETOH, on light CIWA scale and ativan tid ordered.

## 2019-03-07 NOTE — PROGRESS NOTE ADULT - SUBJECTIVE AND OBJECTIVE BOX
HPI:  63 year old male with h/o HTN, HLD, nonischemic cardiomyopathy s/p ACID present with lower back and  chronic neurogenic claudication that is worsening. He states he can only walk for few minutes before pain and discomfort worsens which forces him to stop, sit, and use cane occasionally.  He is now schedule for laminectomy at L@-L4 . Post op      Allergies    No Known Allergies    Intolerances      Nonischemic cardiomyopathy  HLD (hyperlipidemia)  DM (diabetes mellitus)  Hypertension    MEDICATIONS  (STANDING):  acetaminophen   Tablet .. 975 milliGRAM(s) Oral every 6 hours  atorvastatin 10 milliGRAM(s) Oral at bedtime  BUpivacaine liposome 1.3% Injectable 20 milliLiter(s) Local Injection once  carvedilol 25 milliGRAM(s) Oral every 12 hours  cyanocobalamin 1000 MICROGram(s) Oral daily  dextrose 5%. 1000 milliLiter(s) (50 mL/Hr) IV Continuous <Continuous>  dextrose 5%. 1000 milliLiter(s) (50 mL/Hr) IV Continuous <Continuous>  dextrose 50% Injectable 12.5 Gram(s) IV Push once  dextrose 50% Injectable 25 Gram(s) IV Push once  dextrose 50% Injectable 25 Gram(s) IV Push once  dextrose 50% Injectable 12.5 Gram(s) IV Push once  dextrose 50% Injectable 25 Gram(s) IV Push once  dextrose 50% Injectable 25 Gram(s) IV Push once  folic acid 1 milliGRAM(s) Oral daily  gabapentin 300 milliGRAM(s) Oral three times a day  insulin glargine Injectable (LANTUS) 10 Unit(s) SubCutaneous at bedtime  insulin lispro (HumaLOG) corrective regimen sliding scale   SubCutaneous three times a day before meals  insulin lispro Injectable (HumaLOG) 3 Unit(s) SubCutaneous three times a day before meals  lisinopril 20 milliGRAM(s) Oral daily  LORazepam     Tablet 1 milliGRAM(s) Oral three times a day  multivitamin 1 Tablet(s) Oral daily  pantoprazole    Tablet 40 milliGRAM(s) Oral before breakfast  senna 2 Tablet(s) Oral at bedtime  sodium chloride 0.45%. 1000 milliLiter(s) (80 mL/Hr) IV Continuous <Continuous>  sodium chloride 0.9% lock flush 3 milliLiter(s) IV Push every 8 hours  sodium chloride 0.9% lock flush 3 milliLiter(s) IV Push every 8 hours  tamsulosin 0.4 milliGRAM(s) Oral at bedtime    MEDICATIONS  (PRN):  aluminum hydroxide/magnesium hydroxide/simethicone Suspension 30 milliLiter(s) Oral every 12 hours PRN Indigestion  dextrose 40% Gel 15 Gram(s) Oral once PRN Blood Glucose LESS THAN 70 milliGRAM(s)/deciliter  dextrose 40% Gel 15 Gram(s) Oral once PRN Blood Glucose LESS THAN 70 milliGRAM(s)/deciliter  docusate sodium 100 milliGRAM(s) Oral three times a day PRN Constipation  glucagon  Injectable 1 milliGRAM(s) IntraMuscular once PRN Glucose LESS THAN 70 milligrams/deciliter  glucagon  Injectable 1 milliGRAM(s) IntraMuscular once PRN Glucose LESS THAN 70 milligrams/deciliter  hydrALAZINE Injectable 10 milliGRAM(s) IV Push every 6 hours PRN sbp > 170  magnesium hydroxide Suspension 30 milliLiter(s) Oral every 12 hours PRN Constipation  methocarbamol 500 milliGRAM(s) Oral every 6 hours PRN Back Spasms  morphine  - Injectable 2 milliGRAM(s) IV Push every 4 hours PRN breakthrough pain not controlled with current medication  ondansetron Injectable 4 milliGRAM(s) IV Push every 6 hours PRN Nausea  oxyCODONE    IR 5 milliGRAM(s) Oral every 3 hours PRN Moderate Pain (4 - 6)  oxyCODONE    IR 10 milliGRAM(s) Oral every 3 hours PRN Severe Pain (7 - 10)                           13.7   4.8   )-----------( 62       ( 07 Mar 2019 06:05 )             38.6     03-07    139  |  104  |  11.0  ----------------------------<  196<H>  4.1   |  23.0  |  0.61    Ca    8.5<L>      07 Mar 2019 06:05    TPro  6.3<L>  /  Alb  3.3  /  TBili  0.9  /  DBili  x   /  AST  46<H>  /  ALT  49<H>  /  AlkPhos  67  03-06      ;  Vital Signs Last 24 Hrs  T(C): 36.6 (07 Mar 2019 15:24), Max: 37 (06 Mar 2019 20:00)  T(F): 97.9 (07 Mar 2019 15:24), Max: 98.6 (06 Mar 2019 20:00)  HR: 87 (07 Mar 2019 18:05) (80 - 93)  BP: 145/71 (07 Mar 2019 18:05) (139/73 - 155/79)  BP(mean): --  RR: 18 (07 Mar 2019 15:24) (12 - 19)  SpO2: 97% (07 Mar 2019 15:24) (95% - 100%)  CAPILLARY BLOOD GLUCOSE      03-06 @ 07:01  -  03-07 @ 07:00  --------------------------------------------------------  IN: 1300 mL / OUT: 580 mL / NET: 720 mL    03-07 @ 07:01  -  03-07 @ 19:09  --------------------------------------------------------  IN: 0 mL / OUT: 130 mL / NET: -130 mL      Patient feeling better No CP, No SOB, No N/V No Nose or gum bleeding     HEENT: PEARLA  Neck: Supple  Cardio: S1 S2 No Murmur  Pulm: CTA No Rales or Ronchi  Abd: Soft NT ND BS+  Rectal - refused  Back - Incision bandage clean   Ext: No DCT  Skin: No Rash no Petechiae   Neuro: Awake Pleasant      Back Surgery - Pain control ambulation  Thrombocytopenia - Actively being followed as outpatient, agree stop Lovenox pt ambulating well  Nonischemic cardiomyopathy: s/p biventricular AICD  - cont caredolol  HLD (hyperlipidemia) - statin  DM (diabetes mellitus) - SS, lantus  Hypertension - cont meds with parameters   Urinary Frequency -  no retention

## 2019-03-07 NOTE — OCCUPATIONAL THERAPY INITIAL EVALUATION ADULT - ASSISTIVE DEVICE FOR TOILET TRANSFER, REHAB EVAL
no assistive device; elevated toilet seat with right grab bar; recommend use of commode over standard toilet at home to increase surface height and provide armrests

## 2019-03-08 LAB
ANION GAP SERPL CALC-SCNC: 11 MMOL/L — SIGNIFICANT CHANGE UP (ref 5–17)
BASOPHILS # BLD AUTO: 0 K/UL — SIGNIFICANT CHANGE UP (ref 0–0.2)
BASOPHILS NFR BLD AUTO: 0.3 % — SIGNIFICANT CHANGE UP (ref 0–2)
BUN SERPL-MCNC: 12 MG/DL — SIGNIFICANT CHANGE UP (ref 8–20)
CALCIUM SERPL-MCNC: 7.8 MG/DL — LOW (ref 8.6–10.2)
CHLORIDE SERPL-SCNC: 105 MMOL/L — SIGNIFICANT CHANGE UP (ref 98–107)
CO2 SERPL-SCNC: 25 MMOL/L — SIGNIFICANT CHANGE UP (ref 22–29)
CREAT SERPL-MCNC: 0.62 MG/DL — SIGNIFICANT CHANGE UP (ref 0.5–1.3)
EOSINOPHIL # BLD AUTO: 0 K/UL — SIGNIFICANT CHANGE UP (ref 0–0.5)
EOSINOPHIL NFR BLD AUTO: 0.3 % — SIGNIFICANT CHANGE UP (ref 0–5)
GLUCOSE BLDC GLUCOMTR-MCNC: 159 MG/DL — HIGH (ref 70–99)
GLUCOSE BLDC GLUCOMTR-MCNC: 173 MG/DL — HIGH (ref 70–99)
GLUCOSE BLDC GLUCOMTR-MCNC: 207 MG/DL — HIGH (ref 70–99)
GLUCOSE BLDC GLUCOMTR-MCNC: 213 MG/DL — HIGH (ref 70–99)
GLUCOSE SERPL-MCNC: 173 MG/DL — HIGH (ref 70–115)
HCT VFR BLD CALC: 30.5 % — LOW (ref 42–52)
HGB BLD-MCNC: 10.6 G/DL — LOW (ref 14–18)
LYMPHOCYTES # BLD AUTO: 0.8 K/UL — LOW (ref 1–4.8)
LYMPHOCYTES # BLD AUTO: 22.5 % — SIGNIFICANT CHANGE UP (ref 20–55)
MCHC RBC-ENTMCNC: 34.8 G/DL — SIGNIFICANT CHANGE UP (ref 32–36)
MCHC RBC-ENTMCNC: 34.8 PG — HIGH (ref 27–31)
MCV RBC AUTO: 100 FL — HIGH (ref 80–94)
MONOCYTES # BLD AUTO: 0.5 K/UL — SIGNIFICANT CHANGE UP (ref 0–0.8)
MONOCYTES NFR BLD AUTO: 13 % — HIGH (ref 3–10)
NEUTROPHILS # BLD AUTO: 2.4 K/UL — SIGNIFICANT CHANGE UP (ref 1.8–8)
NEUTROPHILS NFR BLD AUTO: 63.9 % — SIGNIFICANT CHANGE UP (ref 37–73)
PLATELET # BLD AUTO: 61 K/UL — LOW (ref 150–400)
POTASSIUM SERPL-MCNC: 4 MMOL/L — SIGNIFICANT CHANGE UP (ref 3.5–5.3)
POTASSIUM SERPL-SCNC: 4 MMOL/L — SIGNIFICANT CHANGE UP (ref 3.5–5.3)
RBC # BLD: 3.05 M/UL — LOW (ref 4.6–6.2)
RBC # FLD: 12.7 % — SIGNIFICANT CHANGE UP (ref 11–15.6)
SODIUM SERPL-SCNC: 141 MMOL/L — SIGNIFICANT CHANGE UP (ref 135–145)
WBC # BLD: 3.8 K/UL — LOW (ref 4.8–10.8)
WBC # FLD AUTO: 3.8 K/UL — LOW (ref 4.8–10.8)

## 2019-03-08 RX ORDER — OXYCODONE HYDROCHLORIDE 5 MG/1
1 TABLET ORAL
Qty: 40 | Refills: 0
Start: 2019-03-08

## 2019-03-08 RX ORDER — METHOCARBAMOL 500 MG/1
1 TABLET, FILM COATED ORAL
Qty: 30 | Refills: 0
Start: 2019-03-08

## 2019-03-08 RX ORDER — INSULIN GLARGINE 100 [IU]/ML
20 INJECTION, SOLUTION SUBCUTANEOUS AT BEDTIME
Qty: 0 | Refills: 0 | Status: DISCONTINUED | OUTPATIENT
Start: 2019-03-08 | End: 2019-03-09

## 2019-03-08 RX ADMIN — Medication 975 MILLIGRAM(S): at 22:00

## 2019-03-08 RX ADMIN — OXYCODONE HYDROCHLORIDE 10 MILLIGRAM(S): 5 TABLET ORAL at 05:57

## 2019-03-08 RX ADMIN — METHOCARBAMOL 500 MILLIGRAM(S): 500 TABLET, FILM COATED ORAL at 05:57

## 2019-03-08 RX ADMIN — Medication 2: at 08:51

## 2019-03-08 RX ADMIN — GABAPENTIN 300 MILLIGRAM(S): 400 CAPSULE ORAL at 13:03

## 2019-03-08 RX ADMIN — SODIUM CHLORIDE 3 MILLILITER(S): 9 INJECTION INTRAMUSCULAR; INTRAVENOUS; SUBCUTANEOUS at 20:57

## 2019-03-08 RX ADMIN — PREGABALIN 1000 MICROGRAM(S): 225 CAPSULE ORAL at 13:03

## 2019-03-08 RX ADMIN — ATORVASTATIN CALCIUM 10 MILLIGRAM(S): 80 TABLET, FILM COATED ORAL at 21:05

## 2019-03-08 RX ADMIN — OXYCODONE HYDROCHLORIDE 10 MILLIGRAM(S): 5 TABLET ORAL at 19:10

## 2019-03-08 RX ADMIN — Medication 4: at 12:48

## 2019-03-08 RX ADMIN — Medication 1 MILLIGRAM(S): at 11:05

## 2019-03-08 RX ADMIN — GABAPENTIN 300 MILLIGRAM(S): 400 CAPSULE ORAL at 21:05

## 2019-03-08 RX ADMIN — Medication 1 MILLIGRAM(S): at 05:59

## 2019-03-08 RX ADMIN — Medication 4: at 16:58

## 2019-03-08 RX ADMIN — GABAPENTIN 300 MILLIGRAM(S): 400 CAPSULE ORAL at 05:58

## 2019-03-08 RX ADMIN — SODIUM CHLORIDE 3 MILLILITER(S): 9 INJECTION INTRAMUSCULAR; INTRAVENOUS; SUBCUTANEOUS at 05:12

## 2019-03-08 RX ADMIN — SENNA PLUS 2 TABLET(S): 8.6 TABLET ORAL at 21:05

## 2019-03-08 RX ADMIN — INSULIN GLARGINE 20 UNIT(S): 100 INJECTION, SOLUTION SUBCUTANEOUS at 21:05

## 2019-03-08 RX ADMIN — Medication 3 UNIT(S): at 08:51

## 2019-03-08 RX ADMIN — Medication 975 MILLIGRAM(S): at 06:58

## 2019-03-08 RX ADMIN — CARVEDILOL PHOSPHATE 25 MILLIGRAM(S): 80 CAPSULE, EXTENDED RELEASE ORAL at 05:58

## 2019-03-08 RX ADMIN — Medication 3 UNIT(S): at 12:48

## 2019-03-08 RX ADMIN — SODIUM CHLORIDE 3 MILLILITER(S): 9 INJECTION INTRAMUSCULAR; INTRAVENOUS; SUBCUTANEOUS at 11:32

## 2019-03-08 RX ADMIN — OXYCODONE HYDROCHLORIDE 10 MILLIGRAM(S): 5 TABLET ORAL at 18:37

## 2019-03-08 RX ADMIN — TAMSULOSIN HYDROCHLORIDE 0.4 MILLIGRAM(S): 0.4 CAPSULE ORAL at 21:04

## 2019-03-08 RX ADMIN — Medication 975 MILLIGRAM(S): at 13:28

## 2019-03-08 RX ADMIN — Medication 975 MILLIGRAM(S): at 21:04

## 2019-03-08 RX ADMIN — Medication 1 TABLET(S): at 13:03

## 2019-03-08 RX ADMIN — OXYCODONE HYDROCHLORIDE 10 MILLIGRAM(S): 5 TABLET ORAL at 06:57

## 2019-03-08 RX ADMIN — Medication 1 MILLIGRAM(S): at 21:04

## 2019-03-08 RX ADMIN — Medication 975 MILLIGRAM(S): at 05:58

## 2019-03-08 RX ADMIN — CARVEDILOL PHOSPHATE 25 MILLIGRAM(S): 80 CAPSULE, EXTENDED RELEASE ORAL at 17:11

## 2019-03-08 RX ADMIN — Medication 3 UNIT(S): at 16:58

## 2019-03-08 RX ADMIN — PANTOPRAZOLE SODIUM 40 MILLIGRAM(S): 20 TABLET, DELAYED RELEASE ORAL at 05:58

## 2019-03-08 RX ADMIN — Medication 975 MILLIGRAM(S): at 00:09

## 2019-03-08 RX ADMIN — Medication 975 MILLIGRAM(S): at 13:03

## 2019-03-08 NOTE — PROGRESS NOTE ADULT - SUBJECTIVE AND OBJECTIVE BOX
Hemovac drain removed  no active drainage from incision or drain site  New dry sterile dressing placed  Pt tolerated HV drain removal well

## 2019-03-08 NOTE — PROGRESS NOTE ADULT - SUBJECTIVE AND OBJECTIVE BOX
HPI:  63 year old male with h/o HTN, HLD, nonischemic cardiomyopathy s/p ACID present with lower back and  chronic neurogenic claudication that is worsening. He states he can only walk for few minutes before pain and discomfort worsens which forces him to stop, sit, and use cane occasionally.  He is now schedule for laminectomy at L@-L4 Post op   Allergies    No Known Allergies    Intolerances      Nonischemic cardiomyopathy  HLD (hyperlipidemia)  DM (diabetes mellitus)  Hypertension    MEDICATIONS  (STANDING):  acetaminophen   Tablet .. 975 milliGRAM(s) Oral every 6 hours  atorvastatin 10 milliGRAM(s) Oral at bedtime  BUpivacaine liposome 1.3% Injectable 20 milliLiter(s) Local Injection once  carvedilol 25 milliGRAM(s) Oral every 12 hours  cyanocobalamin 1000 MICROGram(s) Oral daily  dextrose 5%. 1000 milliLiter(s) (50 mL/Hr) IV Continuous <Continuous>  dextrose 5%. 1000 milliLiter(s) (50 mL/Hr) IV Continuous <Continuous>  dextrose 50% Injectable 12.5 Gram(s) IV Push once  dextrose 50% Injectable 25 Gram(s) IV Push once  dextrose 50% Injectable 25 Gram(s) IV Push once  dextrose 50% Injectable 12.5 Gram(s) IV Push once  dextrose 50% Injectable 25 Gram(s) IV Push once  dextrose 50% Injectable 25 Gram(s) IV Push once  folic acid 1 milliGRAM(s) Oral daily  gabapentin 300 milliGRAM(s) Oral three times a day  insulin glargine Injectable (LANTUS) 20 Unit(s) SubCutaneous at bedtime  insulin lispro (HumaLOG) corrective regimen sliding scale   SubCutaneous three times a day before meals  insulin lispro Injectable (HumaLOG) 3 Unit(s) SubCutaneous three times a day before meals  lisinopril 20 milliGRAM(s) Oral daily  LORazepam     Tablet 1 milliGRAM(s) Oral three times a day  multivitamin 1 Tablet(s) Oral daily  pantoprazole    Tablet 40 milliGRAM(s) Oral before breakfast  senna 2 Tablet(s) Oral at bedtime  sodium chloride 0.45%. 1000 milliLiter(s) (80 mL/Hr) IV Continuous <Continuous>  sodium chloride 0.9% lock flush 3 milliLiter(s) IV Push every 8 hours  sodium chloride 0.9% lock flush 3 milliLiter(s) IV Push every 8 hours  tamsulosin 0.4 milliGRAM(s) Oral at bedtime    MEDICATIONS  (PRN):  acetaminophen   Tablet .. 650 milliGRAM(s) Oral every 6 hours PRN Temp greater or equal to 38C (100.4F), Mild Pain (1 - 3)  aluminum hydroxide/magnesium hydroxide/simethicone Suspension 30 milliLiter(s) Oral every 12 hours PRN Indigestion  dextrose 40% Gel 15 Gram(s) Oral once PRN Blood Glucose LESS THAN 70 milliGRAM(s)/deciliter  dextrose 40% Gel 15 Gram(s) Oral once PRN Blood Glucose LESS THAN 70 milliGRAM(s)/deciliter  docusate sodium 100 milliGRAM(s) Oral three times a day PRN Constipation  glucagon  Injectable 1 milliGRAM(s) IntraMuscular once PRN Glucose LESS THAN 70 milligrams/deciliter  glucagon  Injectable 1 milliGRAM(s) IntraMuscular once PRN Glucose LESS THAN 70 milligrams/deciliter  hydrALAZINE Injectable 10 milliGRAM(s) IV Push every 6 hours PRN sbp > 170  magnesium hydroxide Suspension 30 milliLiter(s) Oral every 12 hours PRN Constipation  methocarbamol 500 milliGRAM(s) Oral every 6 hours PRN Back Spasms  morphine  - Injectable 2 milliGRAM(s) IV Push every 4 hours PRN breakthrough pain not controlled with current medication  ondansetron Injectable 4 milliGRAM(s) IV Push every 6 hours PRN Nausea  oxyCODONE    IR 5 milliGRAM(s) Oral every 3 hours PRN Moderate Pain (4 - 6)  oxyCODONE    IR 10 milliGRAM(s) Oral every 3 hours PRN Severe Pain (7 - 10)                           10.6   3.8   )-----------( 61       ( 08 Mar 2019 07:24 )             30.5     03-08    141  |  105  |  12.0  ----------------------------<  173<H>  4.0   |  25.0  |  0.62    Ca    7.8<L>      08 Mar 2019 07:24    TPro  6.3<L>  /  Alb  3.3  /  TBili  0.9  /  DBili  x   /  AST  46<H>  /  ALT  49<H>  /  AlkPhos  67  03-06      ;  Vital Signs Last 24 Hrs  T(C): 36.8 (08 Mar 2019 15:56), Max: 36.8 (07 Mar 2019 19:26)  T(F): 98.2 (08 Mar 2019 15:56), Max: 98.2 (07 Mar 2019 19:26)  HR: 77 (08 Mar 2019 15:56) (72 - 77)  BP: 155/71 (08 Mar 2019 15:56) (135/66 - 160/79)  BP(mean): --  RR: 18 (08 Mar 2019 15:56) (18 - 19)  SpO2: 98% (08 Mar 2019 08:12) (96% - 99%)  CAPILLARY BLOOD GLUCOSE      03-07 @ 07:01  -  03-08 @ 07:00  --------------------------------------------------------  IN: 600 mL / OUT: 180 mL / NET: 420 mL    03-08 @ 07:01  -  03-08 @ 18:15  --------------------------------------------------------  IN: 0 mL / OUT: 30 mL / NET: -30 mL      Patient feeling better No CP, No SOB, No N/V No Nose or gum bleeding Ambulating well    HEENT: PEARLA  Neck: Supple  Cardio: S1 S2 No Murmur  Pulm: CTA No Rales or Ronchi  Abd: Soft NT ND BS+  Rectal - refused  Back - Incision bandage small blood  Ext: No DCT  Skin: No Rash no Petechiae   Neuro: Awake Pleasant      Back Surgery - Pain control ambulation  Thrombocytopenia - Actively being followed as outpatient Dr Sherman group  agree stop Lovenox pt ambulating well  Nonischemic cardiomyopathy: s/p biventricular AICD  - cont carvedilol  HLD (hyperlipidemia) - statin  DM (diabetes mellitus) - SS, increase dose lantus   Hypertension - cont meds with parameters   Urinary Frequency -  no retention

## 2019-03-08 NOTE — PROGRESS NOTE ADULT - ATTENDING COMMENTS
Patient was evaluated in this morning hours of 38 2019. He states his neurogenic claudication has completely resolved and is freely ambulatory at this point in time states his back pain is well controlled as well. It is monitoring his drain to decrease her chances of postoperative hematoma seroma etc. We'll monitor the drain throughout the day possibly in the afternoon hours of 3 18,019 patient can be safely discharged home or in the am of 3-9

## 2019-03-08 NOTE — PROGRESS NOTE ADULT - SUBJECTIVE AND OBJECTIVE BOX
ELIAS [id number]      STATUS POST:  lumbar laminectomy L2,L3, L4 for lumbar stenosis with neurogenic claudication    SUBJECTIVE: 63 y.o Patient seen at the bedside standing. Patient states he is doing well and complains of mild right thigh pain. Patient is working with physical therapy and ambulating well. Denies fevers, chills, CP, SOB, HA, Dizziness, numbness or weakness. No acute motor or sensory changes reported.       OBJECTIVE:   Vital Signs Last 24 Hrs  T(C): 36.8 (08 Mar 2019 05:08), Max: 36.9 (07 Mar 2019 08:28)  T(F): 98.2 (08 Mar 2019 05:08), Max: 98.5 (07 Mar 2019 08:28)  HR: 75 (08 Mar 2019 05:08) (72 - 87)  BP: 135/66 (08 Mar 2019 05:08) (135/66 - 153/72)  BP(mean): --  RR: 18 (08 Mar 2019 05:08) (18 - 19)  SpO2: 96% (08 Mar 2019 05:08) (95% - 99%)                          10.6   3.8   )-----------( 61       ( 08 Mar 2019 07:24 )             30.5         Constitutional : A&O x3, NAD   Spine:          Dressing:  saturated with bloody drainage, Dressing removed, steri strips in place, Bulky dressing applied                            HV drain in place, 180 cc, output/24 hours         Sensation:           - BL LE SILT                           Motor:         [x ] Lower extremity                     HF(l2)   KE(l3)    TA(l4)   EHL(l5)  GS(s1)                                                 R        5/5        5/5        5/5       5/5         5/5, 5/5 straight leg raise                                               L         5/5        5/5       5/5       5/5          5/5, 5/5 straight leg raise                                                        [x] warm well perfused; capillary refill <3 seconds          LE Compartments soft, compressible throughout BL  	               A/P : 63yMale   S/P lumbar laminectomy as above  POD#2  -    Pain control  -    Dressing changed  -    DVT ppx: SCDs. Early ambulation. Secondary to thrombocytopenia   -    Continue to Monitor Drain Output, can discontinue drain when output equal or less than 10 cc/hr/12 hr. Change dressing when drain pulled and as needed, honeycomb dressing.  Consider ROGERS dressing if incisional area is exudative or large.   -    PT/OT  -    WBAT

## 2019-03-08 NOTE — CHART NOTE - NSCHARTNOTEFT_GEN_A_CORE
called by nurse to assess patient's dressing as it was increasingly becoming more saturated through out the night.  Most of dressing saturated with blood.  Dressing taken down.  Steri strips in place.  Drain functioning.  Scant amount of blood oozing from drain site.  No redness/ecchymosis around incision site.  New dressing placed at 0215.  Will evaluate again around 0700

## 2019-03-09 LAB
ANION GAP SERPL CALC-SCNC: 9 MMOL/L — SIGNIFICANT CHANGE UP (ref 5–17)
BASOPHILS # BLD AUTO: 0 K/UL — SIGNIFICANT CHANGE UP (ref 0–0.2)
BASOPHILS NFR BLD AUTO: 0.3 % — SIGNIFICANT CHANGE UP (ref 0–2)
BUN SERPL-MCNC: 9 MG/DL — SIGNIFICANT CHANGE UP (ref 8–20)
CALCIUM SERPL-MCNC: 8.2 MG/DL — LOW (ref 8.6–10.2)
CHLORIDE SERPL-SCNC: 101 MMOL/L — SIGNIFICANT CHANGE UP (ref 98–107)
CO2 SERPL-SCNC: 27 MMOL/L — SIGNIFICANT CHANGE UP (ref 22–29)
CREAT SERPL-MCNC: 0.69 MG/DL — SIGNIFICANT CHANGE UP (ref 0.5–1.3)
EOSINOPHIL # BLD AUTO: 0 K/UL — SIGNIFICANT CHANGE UP (ref 0–0.5)
EOSINOPHIL NFR BLD AUTO: 1.5 % — SIGNIFICANT CHANGE UP (ref 0–5)
GLUCOSE BLDC GLUCOMTR-MCNC: 147 MG/DL — HIGH (ref 70–99)
GLUCOSE BLDC GLUCOMTR-MCNC: 161 MG/DL — HIGH (ref 70–99)
GLUCOSE BLDC GLUCOMTR-MCNC: 170 MG/DL — HIGH (ref 70–99)
GLUCOSE BLDC GLUCOMTR-MCNC: 180 MG/DL — HIGH (ref 70–99)
GLUCOSE SERPL-MCNC: 187 MG/DL — HIGH (ref 70–115)
HCT VFR BLD CALC: 29.4 % — LOW (ref 42–52)
HGB BLD-MCNC: 10.1 G/DL — LOW (ref 14–18)
LYMPHOCYTES # BLD AUTO: 0.9 K/UL — LOW (ref 1–4.8)
LYMPHOCYTES # BLD AUTO: 27 % — SIGNIFICANT CHANGE UP (ref 20–55)
MCHC RBC-ENTMCNC: 34.4 G/DL — SIGNIFICANT CHANGE UP (ref 32–36)
MCHC RBC-ENTMCNC: 34.6 PG — HIGH (ref 27–31)
MCV RBC AUTO: 100.7 FL — HIGH (ref 80–94)
MONOCYTES # BLD AUTO: 0.6 K/UL — SIGNIFICANT CHANGE UP (ref 0–0.8)
MONOCYTES NFR BLD AUTO: 17 % — HIGH (ref 3–10)
NEUTROPHILS # BLD AUTO: 1.8 K/UL — SIGNIFICANT CHANGE UP (ref 1.8–8)
NEUTROPHILS NFR BLD AUTO: 53.9 % — SIGNIFICANT CHANGE UP (ref 37–73)
PLATELET # BLD AUTO: 61 K/UL — LOW (ref 150–400)
POTASSIUM SERPL-MCNC: 3.9 MMOL/L — SIGNIFICANT CHANGE UP (ref 3.5–5.3)
POTASSIUM SERPL-SCNC: 3.9 MMOL/L — SIGNIFICANT CHANGE UP (ref 3.5–5.3)
RBC # BLD: 2.92 M/UL — LOW (ref 4.6–6.2)
RBC # FLD: 12.8 % — SIGNIFICANT CHANGE UP (ref 11–15.6)
SODIUM SERPL-SCNC: 137 MMOL/L — SIGNIFICANT CHANGE UP (ref 135–145)
WBC # BLD: 3.3 K/UL — LOW (ref 4.8–10.8)
WBC # FLD AUTO: 3.3 K/UL — LOW (ref 4.8–10.8)

## 2019-03-09 RX ORDER — HYDROMORPHONE HYDROCHLORIDE 2 MG/ML
0.5 INJECTION INTRAMUSCULAR; INTRAVENOUS; SUBCUTANEOUS EVERY 4 HOURS
Qty: 0 | Refills: 0 | Status: DISCONTINUED | OUTPATIENT
Start: 2019-03-09 | End: 2019-03-09

## 2019-03-09 RX ADMIN — Medication 1 MILLIGRAM(S): at 12:08

## 2019-03-09 RX ADMIN — CARVEDILOL PHOSPHATE 25 MILLIGRAM(S): 80 CAPSULE, EXTENDED RELEASE ORAL at 17:23

## 2019-03-09 RX ADMIN — HYDROMORPHONE HYDROCHLORIDE 0.5 MILLIGRAM(S): 2 INJECTION INTRAMUSCULAR; INTRAVENOUS; SUBCUTANEOUS at 17:24

## 2019-03-09 RX ADMIN — OXYCODONE HYDROCHLORIDE 10 MILLIGRAM(S): 5 TABLET ORAL at 00:59

## 2019-03-09 RX ADMIN — OXYCODONE HYDROCHLORIDE 10 MILLIGRAM(S): 5 TABLET ORAL at 01:55

## 2019-03-09 RX ADMIN — Medication 2: at 12:09

## 2019-03-09 RX ADMIN — SODIUM CHLORIDE 3 MILLILITER(S): 9 INJECTION INTRAMUSCULAR; INTRAVENOUS; SUBCUTANEOUS at 05:32

## 2019-03-09 RX ADMIN — GABAPENTIN 300 MILLIGRAM(S): 400 CAPSULE ORAL at 05:34

## 2019-03-09 RX ADMIN — Medication 3 UNIT(S): at 09:13

## 2019-03-09 RX ADMIN — Medication 3 UNIT(S): at 17:24

## 2019-03-09 RX ADMIN — CARVEDILOL PHOSPHATE 25 MILLIGRAM(S): 80 CAPSULE, EXTENDED RELEASE ORAL at 05:34

## 2019-03-09 RX ADMIN — Medication 2: at 09:13

## 2019-03-09 RX ADMIN — SODIUM CHLORIDE 3 MILLILITER(S): 9 INJECTION INTRAMUSCULAR; INTRAVENOUS; SUBCUTANEOUS at 23:01

## 2019-03-09 RX ADMIN — Medication 2: at 17:23

## 2019-03-09 RX ADMIN — Medication 650 MILLIGRAM(S): at 16:59

## 2019-03-09 RX ADMIN — SODIUM CHLORIDE 3 MILLILITER(S): 9 INJECTION INTRAMUSCULAR; INTRAVENOUS; SUBCUTANEOUS at 14:09

## 2019-03-09 RX ADMIN — PREGABALIN 1000 MICROGRAM(S): 225 CAPSULE ORAL at 14:19

## 2019-03-09 RX ADMIN — Medication 3 UNIT(S): at 12:09

## 2019-03-09 RX ADMIN — OXYCODONE HYDROCHLORIDE 10 MILLIGRAM(S): 5 TABLET ORAL at 14:22

## 2019-03-09 RX ADMIN — Medication 650 MILLIGRAM(S): at 16:29

## 2019-03-09 RX ADMIN — METHOCARBAMOL 500 MILLIGRAM(S): 500 TABLET, FILM COATED ORAL at 23:10

## 2019-03-09 RX ADMIN — Medication 1 MILLIGRAM(S): at 05:34

## 2019-03-09 RX ADMIN — Medication 1 TABLET(S): at 12:09

## 2019-03-09 RX ADMIN — GABAPENTIN 300 MILLIGRAM(S): 400 CAPSULE ORAL at 14:19

## 2019-03-09 RX ADMIN — OXYCODONE HYDROCHLORIDE 10 MILLIGRAM(S): 5 TABLET ORAL at 23:02

## 2019-03-09 RX ADMIN — GABAPENTIN 300 MILLIGRAM(S): 400 CAPSULE ORAL at 23:01

## 2019-03-09 RX ADMIN — HYDROMORPHONE HYDROCHLORIDE 0.5 MILLIGRAM(S): 2 INJECTION INTRAMUSCULAR; INTRAVENOUS; SUBCUTANEOUS at 17:55

## 2019-03-09 RX ADMIN — METHOCARBAMOL 500 MILLIGRAM(S): 500 TABLET, FILM COATED ORAL at 14:24

## 2019-03-09 RX ADMIN — ATORVASTATIN CALCIUM 10 MILLIGRAM(S): 80 TABLET, FILM COATED ORAL at 23:10

## 2019-03-09 RX ADMIN — SENNA PLUS 2 TABLET(S): 8.6 TABLET ORAL at 23:01

## 2019-03-09 RX ADMIN — OXYCODONE HYDROCHLORIDE 10 MILLIGRAM(S): 5 TABLET ORAL at 14:55

## 2019-03-09 RX ADMIN — PANTOPRAZOLE SODIUM 40 MILLIGRAM(S): 20 TABLET, DELAYED RELEASE ORAL at 05:34

## 2019-03-09 RX ADMIN — Medication 1 MILLIGRAM(S): at 23:01

## 2019-03-09 RX ADMIN — INSULIN GLARGINE 20 UNIT(S): 100 INJECTION, SOLUTION SUBCUTANEOUS at 23:06

## 2019-03-09 NOTE — PROGRESS NOTE ADULT - SUBJECTIVE AND OBJECTIVE BOX
HPI:  63 year old male with h/o HTN, HLD, nonischemic cardiomyopathy s/p ACID present with lower back and  chronic neurogenic claudication that is worsening. He states he can only walk for few minutes before pain and discomfort worsens which forces him to stop, sit, and use cane occasionally.  He is now schedule for laminectomy at L@-L4 (19 Feb 2019 08:58)     Allergies    No Known Allergies    Intolerances      Nonischemic cardiomyopathy  HLD (hyperlipidemia)  DM (diabetes mellitus)  Hypertension    MEDICATIONS  (STANDING):  atorvastatin 10 milliGRAM(s) Oral at bedtime  BUpivacaine liposome 1.3% Injectable 20 milliLiter(s) Local Injection once  carvedilol 25 milliGRAM(s) Oral every 12 hours  cyanocobalamin 1000 MICROGram(s) Oral daily  dextrose 5%. 1000 milliLiter(s) (50 mL/Hr) IV Continuous <Continuous>  dextrose 5%. 1000 milliLiter(s) (50 mL/Hr) IV Continuous <Continuous>  dextrose 50% Injectable 12.5 Gram(s) IV Push once  dextrose 50% Injectable 25 Gram(s) IV Push once  dextrose 50% Injectable 25 Gram(s) IV Push once  dextrose 50% Injectable 12.5 Gram(s) IV Push once  dextrose 50% Injectable 25 Gram(s) IV Push once  dextrose 50% Injectable 25 Gram(s) IV Push once  folic acid 1 milliGRAM(s) Oral daily  gabapentin 300 milliGRAM(s) Oral three times a day  insulin glargine Injectable (LANTUS) 20 Unit(s) SubCutaneous at bedtime  insulin lispro (HumaLOG) corrective regimen sliding scale   SubCutaneous three times a day before meals  insulin lispro Injectable (HumaLOG) 3 Unit(s) SubCutaneous three times a day before meals  lisinopril 20 milliGRAM(s) Oral daily  LORazepam     Tablet 1 milliGRAM(s) Oral three times a day  multivitamin 1 Tablet(s) Oral daily  pantoprazole    Tablet 40 milliGRAM(s) Oral before breakfast  senna 2 Tablet(s) Oral at bedtime  sodium chloride 0.45%. 1000 milliLiter(s) (80 mL/Hr) IV Continuous <Continuous>  sodium chloride 0.9% lock flush 3 milliLiter(s) IV Push every 8 hours  sodium chloride 0.9% lock flush 3 milliLiter(s) IV Push every 8 hours  tamsulosin 0.4 milliGRAM(s) Oral at bedtime    MEDICATIONS  (PRN):  acetaminophen   Tablet .. 650 milliGRAM(s) Oral every 6 hours PRN Temp greater or equal to 38C (100.4F), Mild Pain (1 - 3)  aluminum hydroxide/magnesium hydroxide/simethicone Suspension 30 milliLiter(s) Oral every 12 hours PRN Indigestion  dextrose 40% Gel 15 Gram(s) Oral once PRN Blood Glucose LESS THAN 70 milliGRAM(s)/deciliter  dextrose 40% Gel 15 Gram(s) Oral once PRN Blood Glucose LESS THAN 70 milliGRAM(s)/deciliter  docusate sodium 100 milliGRAM(s) Oral three times a day PRN Constipation  glucagon  Injectable 1 milliGRAM(s) IntraMuscular once PRN Glucose LESS THAN 70 milligrams/deciliter  glucagon  Injectable 1 milliGRAM(s) IntraMuscular once PRN Glucose LESS THAN 70 milligrams/deciliter  hydrALAZINE Injectable 10 milliGRAM(s) IV Push every 6 hours PRN sbp > 170  HYDROmorphone  Injectable 0.5 milliGRAM(s) IV Push every 4 hours PRN breakthrough pain  magnesium hydroxide Suspension 30 milliLiter(s) Oral every 12 hours PRN Constipation  methocarbamol 500 milliGRAM(s) Oral every 6 hours PRN Back Spasms  ondansetron Injectable 4 milliGRAM(s) IV Push every 6 hours PRN Nausea  oxyCODONE    IR 5 milliGRAM(s) Oral every 3 hours PRN Moderate Pain (4 - 6)  oxyCODONE    IR 10 milliGRAM(s) Oral every 3 hours PRN Severe Pain (7 - 10)                           10.1   3.3   )-----------( 61       ( 09 Mar 2019 08:11 )             29.4     03-09    137  |  101  |  9.0  ----------------------------<  187<H>  3.9   |  27.0  |  0.69    Ca    8.2<L>      09 Mar 2019 08:11        ;  Vital Signs Last 24 Hrs  T(C): 37.3 (09 Mar 2019 17:14), Max: 38.2 (09 Mar 2019 15:46)  T(F): 99.1 (09 Mar 2019 17:14), Max: 100.7 (09 Mar 2019 15:46)  HR: 81 (09 Mar 2019 17:14) (73 - 83)  BP: 139/68 (09 Mar 2019 17:14) (131/63 - 151/68)  BP(mean): --  RR: 18 (09 Mar 2019 17:14) (17 - 18)  SpO2: 99% (09 Mar 2019 17:14) (96% - 99%)  CAPILLARY BLOOD GLUCOSE      03-08 @ 07:01  -  03-09 @ 07:00  --------------------------------------------------------  IN: 600 mL / OUT: 30 mL / NET: 570 mL      Patient feeling better No CP, No SOB, No N/V No Nose or gum bleeding Ambulating well    HEENT: PEARLA  Neck: Supple  Cardio: S1 S2 No Murmur  Pulm: CTA No Rales or Ronchi  Abd: Soft NT ND BS+  Rectal - refused  Back - Incision bandage min blood  Ext: No DCT  Skin: No Rash no Petechiae   Neuro: Awake Pleasant      Back Surgery - Pain control ambulation  Thrombocytopenia - Actively being followed as outpatient Dr Sherman group  agree stop Lovenox pt ambulating well  Nonischemic cardiomyopathy: s/p biventricular AICD  - cont carvedilol  HLD (hyperlipidemia) - statin  DM (diabetes mellitus) - SS,  lantus improved  Hypertension - cont meds with parameters

## 2019-03-09 NOTE — PROGRESS NOTE ADULT - SUBJECTIVE AND OBJECTIVE BOX
Ortho PA addendum:   Came to check on patient's dressings; his back dressings remain clean and dry. Yet patient is having an increase in pain. Patient will stay here tonight and we will re evaluate him in the morning.

## 2019-03-09 NOTE — PROGRESS NOTE ADULT - SUBJECTIVE AND OBJECTIVE BOX
Ortho Post Op Check    Name: ELIAS MOORE    MR #: [id number]    Procedure: lumbar laminectomy L2,L3, L4 for lumbar stenosis with neurogenic claudication  Surgeon: Dr Chen    PAST MEDICAL & SURGICAL HISTORY:  Nonischemic cardiomyopathy: s/p biventricular AICD March 2014  HLD (hyperlipidemia)  DM (diabetes mellitus)  Hypertension  H/O excision of mass: back 2012  AICD (automatic cardioverter/defibrillator) present      Patient states overall feels improvement and paresthesias have since resolved. He still has some pain lateral right thigh  Denies CP, SOB, N/V, numbness/tingling     General Exam:  Vital Signs Last 24 Hrs  T(C): 37.1 (03-09-19 @ 07:51), Max: 37.3 (03-09-19 @ 05:26)  T(F): 98.7 (03-09-19 @ 07:51), Max: 99.2 (03-09-19 @ 05:26)  HR: 73 (03-09-19 @ 07:51) (73 - 82)  BP: 141/69 (03-09-19 @ 07:51) (136/70 - 141/69)  BP(mean): --  RR: 18 (03-09-19 @ 07:51) (18 - 18)  SpO2: 96% (03-09-19 @ 07:51) (96% - 96%)             Dressing:  3/4 saturated with serosanguinous drainage, Dressing removed, steri strips in place, New sterile dressing applied         Sensation:  B/L LE SILT, LE Compartments soft, compressible throughout                 Motor:         [x ] Lower extremity                     HF(l2)   KE(l3)    TA(l4)   EHL(l5)  GS(s1)                                                 R        5/5        5/5        5/5       5/5         5/5, 5/5 straight leg raise                                               L         5/5        5/5       5/5       5/5          5/5, 5/5 straight leg raise                                                        [x] warm well perfused; capillary refill <3 seconds                                      10.1   3.3   )-----------( 61       ( 09 Mar 2019 08:11 )             29.4   09 Mar 2019 08:11    137    |  101    |  9.0    ----------------------------<  187    3.9     |  27.0   |  0.69     Ca    8.2        09 Mar 2019 08:11      MEDICATIONS  (STANDING):  atorvastatin 10 milliGRAM(s) Oral at bedtime  BUpivacaine liposome 1.3% Injectable 20 milliLiter(s) Local Injection once  carvedilol 25 milliGRAM(s) Oral every 12 hours  cyanocobalamin 1000 MICROGram(s) Oral daily  dextrose 5%. 1000 milliLiter(s) (50 mL/Hr) IV Continuous <Continuous>  dextrose 5%. 1000 milliLiter(s) (50 mL/Hr) IV Continuous <Continuous>  dextrose 50% Injectable 12.5 Gram(s) IV Push once  dextrose 50% Injectable 25 Gram(s) IV Push once  dextrose 50% Injectable 25 Gram(s) IV Push once  dextrose 50% Injectable 12.5 Gram(s) IV Push once  dextrose 50% Injectable 25 Gram(s) IV Push once  dextrose 50% Injectable 25 Gram(s) IV Push once  folic acid 1 milliGRAM(s) Oral daily  gabapentin 300 milliGRAM(s) Oral three times a day  insulin glargine Injectable (LANTUS) 20 Unit(s) SubCutaneous at bedtime  insulin lispro (HumaLOG) corrective regimen sliding scale   SubCutaneous three times a day before meals  insulin lispro Injectable (HumaLOG) 3 Unit(s) SubCutaneous three times a day before meals  lisinopril 20 milliGRAM(s) Oral daily  LORazepam     Tablet 1 milliGRAM(s) Oral three times a day  multivitamin 1 Tablet(s) Oral daily  pantoprazole    Tablet 40 milliGRAM(s) Oral before breakfast  senna 2 Tablet(s) Oral at bedtime  sodium chloride 0.45%. 1000 milliLiter(s) (80 mL/Hr) IV Continuous <Continuous>  sodium chloride 0.9% lock flush 3 milliLiter(s) IV Push every 8 hours  sodium chloride 0.9% lock flush 3 milliLiter(s) IV Push every 8 hours  tamsulosin 0.4 milliGRAM(s) Oral at bedtime    MEDICATIONS  (PRN):  acetaminophen   Tablet .. 650 milliGRAM(s) Oral every 6 hours PRN Temp greater or equal to 38C (100.4F), Mild Pain (1 - 3)  aluminum hydroxide/magnesium hydroxide/simethicone Suspension 30 milliLiter(s) Oral every 12 hours PRN Indigestion  dextrose 40% Gel 15 Gram(s) Oral once PRN Blood Glucose LESS THAN 70 milliGRAM(s)/deciliter  dextrose 40% Gel 15 Gram(s) Oral once PRN Blood Glucose LESS THAN 70 milliGRAM(s)/deciliter  docusate sodium 100 milliGRAM(s) Oral three times a day PRN Constipation  glucagon  Injectable 1 milliGRAM(s) IntraMuscular once PRN Glucose LESS THAN 70 milligrams/deciliter  glucagon  Injectable 1 milliGRAM(s) IntraMuscular once PRN Glucose LESS THAN 70 milligrams/deciliter  hydrALAZINE Injectable 10 milliGRAM(s) IV Push every 6 hours PRN sbp > 170  magnesium hydroxide Suspension 30 milliLiter(s) Oral every 12 hours PRN Constipation  methocarbamol 500 milliGRAM(s) Oral every 6 hours PRN Back Spasms  morphine  - Injectable 2 milliGRAM(s) IV Push every 4 hours PRN breakthrough pain not controlled with current medication  ondansetron Injectable 4 milliGRAM(s) IV Push every 6 hours PRN Nausea  oxyCODONE    IR 5 milliGRAM(s) Oral every 3 hours PRN Moderate Pain (4 - 6)  oxyCODONE    IR 10 milliGRAM(s) Oral every 3 hours PRN Severe Pain (7 - 10)      A/P: 63yMale POD#3 s/p lumbar laminectomy L2,L3, L4 for lumbar stenosis with neurogenic claudication  - Stable  - Pain Control  - DVT ppx: SCDs and ambulating well  - Weight bearing status: WBAT  - Will monitor drainage. If dressing dry later this afternoon we will allow patient to go home. If dressing have continued drainage we will keep patient until tomorrow

## 2019-03-10 VITALS
TEMPERATURE: 98 F | HEART RATE: 80 BPM | OXYGEN SATURATION: 100 % | DIASTOLIC BLOOD PRESSURE: 61 MMHG | SYSTOLIC BLOOD PRESSURE: 112 MMHG | RESPIRATION RATE: 18 BRPM

## 2019-03-10 LAB
GLUCOSE BLDC GLUCOMTR-MCNC: 139 MG/DL — HIGH (ref 70–99)
GLUCOSE BLDC GLUCOMTR-MCNC: 159 MG/DL — HIGH (ref 70–99)
GLUCOSE BLDC GLUCOMTR-MCNC: 173 MG/DL — HIGH (ref 70–99)

## 2019-03-10 PROCEDURE — 86850 RBC ANTIBODY SCREEN: CPT

## 2019-03-10 PROCEDURE — C1889: CPT

## 2019-03-10 PROCEDURE — 80048 BASIC METABOLIC PNL TOTAL CA: CPT

## 2019-03-10 PROCEDURE — 86901 BLOOD TYPING SEROLOGIC RH(D): CPT

## 2019-03-10 PROCEDURE — 97167 OT EVAL HIGH COMPLEX 60 MIN: CPT

## 2019-03-10 PROCEDURE — 85027 COMPLETE CBC AUTOMATED: CPT

## 2019-03-10 PROCEDURE — 82962 GLUCOSE BLOOD TEST: CPT

## 2019-03-10 PROCEDURE — 80053 COMPREHEN METABOLIC PANEL: CPT

## 2019-03-10 PROCEDURE — 36415 COLL VENOUS BLD VENIPUNCTURE: CPT

## 2019-03-10 PROCEDURE — 86923 COMPATIBILITY TEST ELECTRIC: CPT

## 2019-03-10 PROCEDURE — 86900 BLOOD TYPING SEROLOGIC ABO: CPT

## 2019-03-10 PROCEDURE — 76000 FLUOROSCOPY <1 HR PHYS/QHP: CPT

## 2019-03-10 RX ADMIN — OXYCODONE HYDROCHLORIDE 10 MILLIGRAM(S): 5 TABLET ORAL at 07:00

## 2019-03-10 RX ADMIN — SODIUM CHLORIDE 3 MILLILITER(S): 9 INJECTION INTRAMUSCULAR; INTRAVENOUS; SUBCUTANEOUS at 06:12

## 2019-03-10 RX ADMIN — Medication 1 MILLIGRAM(S): at 06:04

## 2019-03-10 RX ADMIN — GABAPENTIN 300 MILLIGRAM(S): 400 CAPSULE ORAL at 05:59

## 2019-03-10 RX ADMIN — SODIUM CHLORIDE 3 MILLILITER(S): 9 INJECTION INTRAMUSCULAR; INTRAVENOUS; SUBCUTANEOUS at 06:13

## 2019-03-10 RX ADMIN — LISINOPRIL 20 MILLIGRAM(S): 2.5 TABLET ORAL at 05:59

## 2019-03-10 RX ADMIN — OXYCODONE HYDROCHLORIDE 10 MILLIGRAM(S): 5 TABLET ORAL at 00:00

## 2019-03-10 RX ADMIN — CARVEDILOL PHOSPHATE 25 MILLIGRAM(S): 80 CAPSULE, EXTENDED RELEASE ORAL at 05:59

## 2019-03-10 RX ADMIN — OXYCODONE HYDROCHLORIDE 10 MILLIGRAM(S): 5 TABLET ORAL at 06:04

## 2019-03-10 RX ADMIN — METHOCARBAMOL 500 MILLIGRAM(S): 500 TABLET, FILM COATED ORAL at 05:59

## 2019-03-10 RX ADMIN — PANTOPRAZOLE SODIUM 40 MILLIGRAM(S): 20 TABLET, DELAYED RELEASE ORAL at 05:59

## 2019-03-10 NOTE — PROGRESS NOTE ADULT - PROVIDER SPECIALTY LIST ADULT
Family Medicine
Orthopedics

## 2019-03-10 NOTE — PROGRESS NOTE ADULT - SUBJECTIVE AND OBJECTIVE BOX
ELIAS MOORE    [id number]    POST OPERATIVE DAY #: 4  Procedure: lumbar laminectomy L2,L3, L4 for lumbar stenosis with neurogenic claudication  Surgeon: Dr Chen                      Pain (0-10): 6    OBJECTIVE:     Vital Signs Last 24 Hrs  T(C): 37.7 (10 Mar 2019 07:40), Max: 38.2 (09 Mar 2019 15:46)  T(F): 99.8 (10 Mar 2019 07:40), Max: 100.7 (09 Mar 2019 15:46)  HR: 82 (10 Mar 2019 07:40) (75 - 84)  BP: 148/66 (10 Mar 2019 07:40) (123/67 - 148/66)  BP(mean): --  RR: 18 (10 Mar 2019 07:40) (17 - 18)  SpO2: 97% (10 Mar 2019 07:40) (95% - 99%)  I&O's Summary      Constitutional: Alert, responsive, in no acute distress.   Abdominal: soft and supple non distended  Lymphatics no pretibial pitting edema    Spine:          Dressing: removed to reveal incision to be clean/dry/intact. New sterile dressings applied         Sensation:  B/L LE SILT, LE Compartments soft, compressible throughout                 Motor:         [x ] Lower extremity                     HF(l2)   KE(l3)    TA(l4)   EHL(l5)  GS(s1)                                                 R        5/5        5/5        5/5       5/5         5/5, 5/5 straight leg raise                                               L         5/5        5/5       5/5       5/5          5/5, 5/5 straight leg raise                                          Vascular:  [x ] warm well perfused; capillary refill <3 seconds                             LABS:                        10.1   3.3   )-----------( 61       ( 09 Mar 2019 08:11 )             29.4       03-09    137  |  101  |  9.0  ----------------------------<  187<H>  3.9   |  27.0  |  0.69    Ca    8.2<L>      09 Mar 2019 08:11      MEDICATIONS  (STANDING):  atorvastatin 10 milliGRAM(s) Oral at bedtime  BUpivacaine liposome 1.3% Injectable 20 milliLiter(s) Local Injection once  carvedilol 25 milliGRAM(s) Oral every 12 hours  cyanocobalamin 1000 MICROGram(s) Oral daily  dextrose 5%. 1000 milliLiter(s) (50 mL/Hr) IV Continuous <Continuous>  dextrose 5%. 1000 milliLiter(s) (50 mL/Hr) IV Continuous <Continuous>  dextrose 50% Injectable 12.5 Gram(s) IV Push once  dextrose 50% Injectable 25 Gram(s) IV Push once  dextrose 50% Injectable 25 Gram(s) IV Push once  dextrose 50% Injectable 12.5 Gram(s) IV Push once  dextrose 50% Injectable 25 Gram(s) IV Push once  dextrose 50% Injectable 25 Gram(s) IV Push once  folic acid 1 milliGRAM(s) Oral daily  gabapentin 300 milliGRAM(s) Oral three times a day  insulin glargine Injectable (LANTUS) 20 Unit(s) SubCutaneous at bedtime  insulin lispro (HumaLOG) corrective regimen sliding scale   SubCutaneous three times a day before meals  insulin lispro Injectable (HumaLOG) 3 Unit(s) SubCutaneous three times a day before meals  lisinopril 20 milliGRAM(s) Oral daily  LORazepam     Tablet 1 milliGRAM(s) Oral three times a day  multivitamin 1 Tablet(s) Oral daily  pantoprazole    Tablet 40 milliGRAM(s) Oral before breakfast  senna 2 Tablet(s) Oral at bedtime  sodium chloride 0.45%. 1000 milliLiter(s) (80 mL/Hr) IV Continuous <Continuous>  sodium chloride 0.9% lock flush 3 milliLiter(s) IV Push every 8 hours  sodium chloride 0.9% lock flush 3 milliLiter(s) IV Push every 8 hours  tamsulosin 0.4 milliGRAM(s) Oral at bedtime    MEDICATIONS  (PRN):  acetaminophen   Tablet .. 650 milliGRAM(s) Oral every 6 hours PRN Temp greater or equal to 38C (100.4F), Mild Pain (1 - 3)  aluminum hydroxide/magnesium hydroxide/simethicone Suspension 30 milliLiter(s) Oral every 12 hours PRN Indigestion  dextrose 40% Gel 15 Gram(s) Oral once PRN Blood Glucose LESS THAN 70 milliGRAM(s)/deciliter  dextrose 40% Gel 15 Gram(s) Oral once PRN Blood Glucose LESS THAN 70 milliGRAM(s)/deciliter  docusate sodium 100 milliGRAM(s) Oral three times a day PRN Constipation  glucagon  Injectable 1 milliGRAM(s) IntraMuscular once PRN Glucose LESS THAN 70 milligrams/deciliter  glucagon  Injectable 1 milliGRAM(s) IntraMuscular once PRN Glucose LESS THAN 70 milligrams/deciliter  hydrALAZINE Injectable 10 milliGRAM(s) IV Push every 6 hours PRN sbp > 170  HYDROmorphone  Injectable 0.5 milliGRAM(s) IV Push every 4 hours PRN breakthrough pain  magnesium hydroxide Suspension 30 milliLiter(s) Oral every 12 hours PRN Constipation  methocarbamol 500 milliGRAM(s) Oral every 6 hours PRN Back Spasms  ondansetron Injectable 4 milliGRAM(s) IV Push every 6 hours PRN Nausea  oxyCODONE    IR 5 milliGRAM(s) Oral every 3 hours PRN Moderate Pain (4 - 6)  oxyCODONE    IR 10 milliGRAM(s) Oral every 3 hours PRN Severe Pain (7 - 10)      A/P: 63yMale POD#4 s/p lumbar laminectomy L2,L3, L4 for lumbar stenosis with neurogenic claudication  - Stable  - Pain Control  - DVT ppx: SCDs and ambulating well  - Weight bearing status: WBAT  - Patient to be DC to home today.

## 2019-03-10 NOTE — PROVIDER CONTACT NOTE (OTHER) - ACTION/TREATMENT ORDERED:
PA spoke to the patient.  Patient agreed to wait for the wheelchair. Patient verbalized not feeling dizzy anymore.

## 2019-03-10 NOTE — PROVIDER CONTACT NOTE (OTHER) - SITUATION
Patient is for discharge. Patient c/o of feeling dizzy but said wanted to leave right away. Patient also refusing to wait for the wheelchair to transport him to the hospital's exit door.
Patient has Humalog insulin order with meals.  Patient refused Humalog. .

## 2019-03-11 ENCOUNTER — EMERGENCY (EMERGENCY)
Facility: HOSPITAL | Age: 64
LOS: 1 days | Discharge: HOME | End: 2019-03-11
Attending: EMERGENCY MEDICINE
Payer: MEDICAID

## 2019-03-11 VITALS
SYSTOLIC BLOOD PRESSURE: 118 MMHG | TEMPERATURE: 101 F | HEART RATE: 84 BPM | OXYGEN SATURATION: 96 % | DIASTOLIC BLOOD PRESSURE: 68 MMHG | RESPIRATION RATE: 20 BRPM

## 2019-03-11 VITALS
DIASTOLIC BLOOD PRESSURE: 73 MMHG | WEIGHT: 190.04 LBS | SYSTOLIC BLOOD PRESSURE: 150 MMHG | HEART RATE: 77 BPM | RESPIRATION RATE: 20 BRPM | TEMPERATURE: 98 F | HEIGHT: 68 IN | OXYGEN SATURATION: 100 %

## 2019-03-11 DIAGNOSIS — Z95.810 PRESENCE OF AUTOMATIC (IMPLANTABLE) CARDIAC DEFIBRILLATOR: Chronic | ICD-10-CM

## 2019-03-11 DIAGNOSIS — Z98.890 OTHER SPECIFIED POSTPROCEDURAL STATES: Chronic | ICD-10-CM

## 2019-03-11 LAB
ALBUMIN SERPL ELPH-MCNC: 3.5 G/DL — SIGNIFICANT CHANGE UP (ref 3.3–5.2)
ALP SERPL-CCNC: 80 U/L — SIGNIFICANT CHANGE UP (ref 40–120)
ALT FLD-CCNC: 47 U/L — HIGH
ANION GAP SERPL CALC-SCNC: 10 MMOL/L — SIGNIFICANT CHANGE UP (ref 5–17)
AST SERPL-CCNC: 57 U/L — HIGH
BASE EXCESS BLDV CALC-SCNC: 4.6 MMOL/L — HIGH (ref -2–2)
BASOPHILS # BLD AUTO: 0 K/UL — SIGNIFICANT CHANGE UP (ref 0–0.2)
BASOPHILS NFR BLD AUTO: 0.7 % — SIGNIFICANT CHANGE UP (ref 0–2)
BILIRUB SERPL-MCNC: 1.1 MG/DL — SIGNIFICANT CHANGE UP (ref 0.4–2)
BUN SERPL-MCNC: 9 MG/DL — SIGNIFICANT CHANGE UP (ref 8–20)
CA-I SERPL-SCNC: 1.1 MMOL/L — LOW (ref 1.15–1.33)
CALCIUM SERPL-MCNC: 8.4 MG/DL — LOW (ref 8.6–10.2)
CHLORIDE BLDV-SCNC: 100 MMOL/L — SIGNIFICANT CHANGE UP (ref 98–107)
CHLORIDE SERPL-SCNC: 99 MMOL/L — SIGNIFICANT CHANGE UP (ref 98–107)
CK SERPL-CCNC: 117 U/L — SIGNIFICANT CHANGE UP (ref 30–200)
CO2 SERPL-SCNC: 26 MMOL/L — SIGNIFICANT CHANGE UP (ref 22–29)
CREAT SERPL-MCNC: 0.62 MG/DL — SIGNIFICANT CHANGE UP (ref 0.5–1.3)
EOSINOPHIL # BLD AUTO: 0 K/UL — SIGNIFICANT CHANGE UP (ref 0–0.5)
EOSINOPHIL NFR BLD AUTO: 1.8 % — SIGNIFICANT CHANGE UP (ref 0–6)
GAS PNL BLDV: 134 MMOL/L — LOW (ref 135–145)
GAS PNL BLDV: SIGNIFICANT CHANGE UP
GAS PNL BLDV: SIGNIFICANT CHANGE UP
GLUCOSE BLDV-MCNC: 161 MG/DL — HIGH (ref 70–99)
GLUCOSE SERPL-MCNC: 172 MG/DL — HIGH (ref 70–115)
HBA1C BLD-MCNC: 6.5 % — HIGH (ref 4–5.6)
HCO3 BLDV-SCNC: 28 MMOL/L — HIGH (ref 20–26)
HCT VFR BLD CALC: 28.7 % — LOW (ref 42–52)
HCT VFR BLDA CALC: 33 — LOW (ref 39–50)
HGB BLD CALC-MCNC: 10.8 G/DL — LOW (ref 13–17)
HGB BLD-MCNC: 10 G/DL — LOW (ref 14–18)
LACTATE BLDV-MCNC: 1.2 MMOL/L — SIGNIFICANT CHANGE UP (ref 0.5–2)
LYMPHOCYTES # BLD AUTO: 0.5 K/UL — LOW (ref 1–4.8)
LYMPHOCYTES # BLD AUTO: 17.7 % — LOW (ref 20–55)
MCHC RBC-ENTMCNC: 34.7 PG — HIGH (ref 27–31)
MCHC RBC-ENTMCNC: 34.8 G/DL — SIGNIFICANT CHANGE UP (ref 32–36)
MCV RBC AUTO: 99.7 FL — HIGH (ref 80–94)
MONOCYTES # BLD AUTO: 0.4 K/UL — SIGNIFICANT CHANGE UP (ref 0–0.8)
MONOCYTES NFR BLD AUTO: 16.2 % — HIGH (ref 3–10)
NEUTROPHILS # BLD AUTO: 1.7 K/UL — LOW (ref 1.8–8)
NEUTROPHILS NFR BLD AUTO: 62.5 % — SIGNIFICANT CHANGE UP (ref 37–73)
OTHER CELLS CSF MANUAL: 9 ML/DL — LOW (ref 18–22)
PCO2 BLDV: 53 MMHG — HIGH (ref 35–50)
PH BLDV: 7.37 — SIGNIFICANT CHANGE UP (ref 7.32–7.43)
PLATELET # BLD AUTO: 86 K/UL — LOW (ref 150–400)
PO2 BLDV: 35 MMHG — SIGNIFICANT CHANGE UP (ref 25–45)
POTASSIUM BLDV-SCNC: 3.8 MMOL/L — SIGNIFICANT CHANGE UP (ref 3.4–4.5)
POTASSIUM SERPL-MCNC: 3.9 MMOL/L — SIGNIFICANT CHANGE UP (ref 3.5–5.3)
POTASSIUM SERPL-SCNC: 3.9 MMOL/L — SIGNIFICANT CHANGE UP (ref 3.5–5.3)
PROT SERPL-MCNC: 6.5 G/DL — LOW (ref 6.6–8.7)
RBC # BLD: 2.88 M/UL — LOW (ref 4.6–6.2)
RBC # FLD: 12.2 % — SIGNIFICANT CHANGE UP (ref 11–15.6)
SAO2 % BLDV: 61 % — SIGNIFICANT CHANGE UP
SODIUM SERPL-SCNC: 135 MMOL/L — SIGNIFICANT CHANGE UP (ref 135–145)
TROPONIN T SERPL-MCNC: <0.01 NG/ML — SIGNIFICANT CHANGE UP (ref 0–0.06)
WBC # BLD: 2.8 K/UL — LOW (ref 4.8–10.8)
WBC # FLD AUTO: 2.8 K/UL — LOW (ref 4.8–10.8)

## 2019-03-11 PROCEDURE — 84484 ASSAY OF TROPONIN QUANT: CPT

## 2019-03-11 PROCEDURE — 82947 ASSAY GLUCOSE BLOOD QUANT: CPT

## 2019-03-11 PROCEDURE — 96375 TX/PRO/DX INJ NEW DRUG ADDON: CPT

## 2019-03-11 PROCEDURE — 84132 ASSAY OF SERUM POTASSIUM: CPT

## 2019-03-11 PROCEDURE — 96374 THER/PROPH/DIAG INJ IV PUSH: CPT

## 2019-03-11 PROCEDURE — 36415 COLL VENOUS BLD VENIPUNCTURE: CPT

## 2019-03-11 PROCEDURE — 83605 ASSAY OF LACTIC ACID: CPT

## 2019-03-11 PROCEDURE — 99284 EMERGENCY DEPT VISIT MOD MDM: CPT | Mod: 25

## 2019-03-11 PROCEDURE — 83036 HEMOGLOBIN GLYCOSYLATED A1C: CPT

## 2019-03-11 PROCEDURE — 84295 ASSAY OF SERUM SODIUM: CPT

## 2019-03-11 PROCEDURE — 80053 COMPREHEN METABOLIC PANEL: CPT

## 2019-03-11 PROCEDURE — 82803 BLOOD GASES ANY COMBINATION: CPT

## 2019-03-11 PROCEDURE — 82550 ASSAY OF CK (CPK): CPT

## 2019-03-11 PROCEDURE — 82330 ASSAY OF CALCIUM: CPT

## 2019-03-11 PROCEDURE — 85027 COMPLETE CBC AUTOMATED: CPT

## 2019-03-11 PROCEDURE — 99284 EMERGENCY DEPT VISIT MOD MDM: CPT

## 2019-03-11 PROCEDURE — 93005 ELECTROCARDIOGRAM TRACING: CPT

## 2019-03-11 PROCEDURE — 99284 EMERGENCY DEPT VISIT MOD MDM: CPT | Mod: 24

## 2019-03-11 PROCEDURE — 93010 ELECTROCARDIOGRAM REPORT: CPT

## 2019-03-11 PROCEDURE — 85014 HEMATOCRIT: CPT

## 2019-03-11 PROCEDURE — 82435 ASSAY OF BLOOD CHLORIDE: CPT

## 2019-03-11 PROCEDURE — 82962 GLUCOSE BLOOD TEST: CPT

## 2019-03-11 RX ORDER — HYDROMORPHONE HYDROCHLORIDE 2 MG/ML
1 INJECTION INTRAMUSCULAR; INTRAVENOUS; SUBCUTANEOUS
Qty: 4 | Refills: 0
Start: 2019-03-11 | End: 2019-03-12

## 2019-03-11 RX ORDER — ONDANSETRON 8 MG/1
4 TABLET, FILM COATED ORAL ONCE
Qty: 0 | Refills: 0 | Status: COMPLETED | OUTPATIENT
Start: 2019-03-11 | End: 2019-03-11

## 2019-03-11 RX ORDER — MORPHINE SULFATE 50 MG/1
4 CAPSULE, EXTENDED RELEASE ORAL ONCE
Qty: 0 | Refills: 0 | Status: DISCONTINUED | OUTPATIENT
Start: 2019-03-11 | End: 2019-03-11

## 2019-03-11 RX ORDER — HYDROMORPHONE HYDROCHLORIDE 2 MG/ML
2 INJECTION INTRAMUSCULAR; INTRAVENOUS; SUBCUTANEOUS ONCE
Qty: 0 | Refills: 0 | Status: DISCONTINUED | OUTPATIENT
Start: 2019-03-11 | End: 2019-03-11

## 2019-03-11 RX ADMIN — MORPHINE SULFATE 4 MILLIGRAM(S): 50 CAPSULE, EXTENDED RELEASE ORAL at 12:20

## 2019-03-11 RX ADMIN — HYDROMORPHONE HYDROCHLORIDE 2 MILLIGRAM(S): 2 INJECTION INTRAMUSCULAR; INTRAVENOUS; SUBCUTANEOUS at 19:44

## 2019-03-11 RX ADMIN — ONDANSETRON 4 MILLIGRAM(S): 8 TABLET, FILM COATED ORAL at 11:54

## 2019-03-11 RX ADMIN — HYDROMORPHONE HYDROCHLORIDE 2 MILLIGRAM(S): 2 INJECTION INTRAMUSCULAR; INTRAVENOUS; SUBCUTANEOUS at 20:15

## 2019-03-11 RX ADMIN — MORPHINE SULFATE 4 MILLIGRAM(S): 50 CAPSULE, EXTENDED RELEASE ORAL at 11:54

## 2019-03-11 NOTE — ED CLERICAL - NS ED CLERK NOTE PRE-ARRIVAL INFORMATION; ADDITIONAL PRE-ARRIVAL INFORMATION
This patient is enrolled in the [facility] Care Transitions program and has active care navigation. This patient can be followed up by the care navigation team within 24 hours. To arrange close follow-up or to obtain additional clinical information about this patient, please call the contact number above.

## 2019-03-11 NOTE — CONSULT NOTE ADULT - ATTENDING COMMENTS
Incision is clean dry and intact with no significant dehiscence at this point in time. I do think this is somewhat of an anticipated presentation secondary to her social situation. Patient is known at home for assistance. Which I think is part of the driving force to return to the ED. Patient's pain will be treated with Toradol IV Tylenol. We can maybe start light dose a gabapentin for additional pain control. Anticipate discharge home if his pain is well controlled with followup in the office this week.

## 2019-03-11 NOTE — ED PROVIDER NOTE - OBJECTIVE STATEMENT
This patient is a 63 year old man who presents to the ER c/o back pain.  Patient is post op day 5 s/p laminectomy.  Patient states that  he should not have been discharged because he has been in excruciating pain that is not controlled with the medications he was discharged with.  He denies urinary retention, fecal incontinence or fever.

## 2019-03-11 NOTE — PROVIDER CONTACT NOTE (OTHER) - ASSESSMENT
PT ordered. chart reviewed and noted. pt received in ED, standing at bedside, agreeable to PT. pt at baseline function, will not follow. pt left as received, back pain pre/post session 7/10, MD aware of function

## 2019-03-11 NOTE — ED PROVIDER NOTE - CLINICAL SUMMARY MEDICAL DECISION MAKING FREE TEXT BOX
63 year old recent laminectomy 5 days ago c/o pain no neurological deficits.  Patient seen and evaluated by Orthopedics.  Patient given pain medication and instructed to follow-up with Orthopedics as outpatient.

## 2019-03-11 NOTE — ED PROVIDER NOTE - SKIN, MLM
Skin normal color for race, warm, dry and intact. No evidence of rash. Dressings clean, dry and intact.

## 2019-03-11 NOTE — ED ADULT NURSE NOTE - OBJECTIVE STATEMENT
Pt d/c from hospital yesterday, s/p laminectomy. Pt states he "had difficulty walking due to excruciating pain from back, which radiated down his right leg." GAIL back, pt unable to move due to pain.

## 2019-03-11 NOTE — ED ADULT TRIAGE NOTE - CHIEF COMPLAINT QUOTE
had back surgery last Wednesday. slow to ambulate from laminectomy. recently DC. Signed out yesterday. comes back for continued pain, states he signed out to soon. Also needs help at home and more medication.

## 2019-03-11 NOTE — ED ADULT NURSE NOTE - INTERVENTIONS DEFINITIONS
Instruct patient to call for assistance/Stretcher in lowest position, wheels locked, appropriate side rails in place/Physically safe environment: no spills, clutter or unnecessary equipment Instruct patient to call for assistance/Physically safe environment: no spills, clutter or unnecessary equipment/Stretcher in lowest position, wheels locked, appropriate side rails in place

## 2019-03-11 NOTE — ED PROVIDER NOTE - PROGRESS NOTE DETAILS
Orthopedics called and made aware. Orthopedic resident states the plan is to for patient to be given pain medication and then will be re-evaluated by Ortho at 6pm. Patient ambulating around the ER without difficulty. Orthopedic DANIEL Pate states the plan is to for patient to be given pain medication and then will be re-evaluated by Ortho at 6pm.

## 2019-03-11 NOTE — CHART NOTE - NSCHARTNOTEFT_GEN_A_CORE
SWNote:  p/c from PT'S ED MD (Dr Higuera) to inform worker about pt's d/c and need for transportation assistance. Worker spoke with pt states he is staying at a friend's house([address] [place] [address]) given that he can not climb stairs . Worker probed pt for possible ride from friends states none available. Pt has [facility] for insurance /Worker obtained pt's straight [employer] number with registration staff #[id number]. [employer] called (carole) [employer] requested, reservation# [phone]. A router will  asap with [employer] and [employer] company. SW to follow.

## 2019-03-11 NOTE — SBIRT NOTE. - NSSBIRTSERVICES_GEN_A_ED_FT
Provided SBIRT services: Full screen Negative. Positive reinforcement provided given patient currently within healthy guidelines. Education materials reviewed and given to patient.  Audit Score: 6  DAST Score: 0  Duration = 10 Minutes

## 2019-03-11 NOTE — ED STATDOCS - PROGRESS NOTE DETAILS
62 y/o M pt 1 day s/p laminectomy with PMHx DM (on metformin), thrombocytopenia (unknown origin) presents to the ED c/o severe back pain.  Pt had laminectomy March 10th, was discharged home yesterday.  He notes pain since the surgery, gradually worsening today.  He notes excruciating pain today, unable to ambulate without severe pain.  Pt had a near syncopal episode at home this morning, reports feeling fatigued and weak currently.  He checked his blood sugar at home, found to be 230.  Pt denies urinary symptoms, unable to remember if he had a BM today.  Pt  took oxycodone 10mg at home today.  He lives alone at home.  Exam: pt appears to be in severe pain, pain while sitting, ambulating with extreme discomfort. surgical dressing in place to lumbar region.  EKG shows IVCD, NSR, interventricular conduction block, not meeting [place], no prior EKG available.  Pt unable to sit for a long time, will sent pt to [facility] ED.  Give pain control, obtain labs, cardiac monitor, EKG.

## 2019-03-11 NOTE — CONSULT NOTE ADULT - SUBJECTIVE AND OBJECTIVE BOX
Pt Name: ELIAS MOORE    MRN: [id number]    Patient is a 63y Male presenting to the emergency department s/p lumbar laminectomy L1-3 Wednesday 3/6/19 at [facility] with Dr. Chen discharged from hospital yesterday Sudhir 3/10/19 stating he felt good yesterday but the pain this morning was severe and didn't have the same pain medication he was getting while in the hospital. Patient does note he never picked up his prescribed medication at his pharmacy sent over the weekend from hospital but was taking flexeril and oxycodone his primary had given him preop. Patient notes his symptoms consist of back pain by incision and pain radiating from back down right leg to knee however states his symptoms preop were numbness/tingling and pain down both legs which is much improved. Patient denies fever and chills and has been ambulating without assistance.    REVIEW OF SYSTEMS      General:	denies fever, chills    Skin/Breast:  incision low back  	  Respiratory and Thorax: denies SOB  	  Cardiovascular:	denies CP    Gastrointestinal:	denies abdominal pain    Genitourinary:	denies incontinence    Musculoskeletal:	 + back pain    Neurological:	denies paresthesias    PAST MEDICAL & SURGICAL HISTORY:  Nonischemic cardiomyopathy: s/p biventricular AICD March 2014  HLD (hyperlipidemia)  DM (diabetes mellitus)  Hypertension  H/O excision of mass: back 2012  AICD (automatic cardioverter/defibrillator) present    Allergies: No Known Allergies    Medications: see med rec    FAMILY HISTORY:  Family history of colon cancer (Father)  Family history of gastric cancer (Mother)  : non-contributory    Social History: ETOH rare use  Denies smoking  Denies illicit drug use    Ambulation: Walking independently                          10.0   2.8   )-----------( 86       ( 11 Mar 2019 11:55 )             28.7     03-11    135  |  99  |  9.0  ----------------------------<  172<H>  3.9   |  26.0  |  0.62    PHYSICAL EXAM:    Vital Signs Last 24 Hrs  T(C): 37.1 (11 Mar 2019 11:40), Max: 37.1 (11 Mar 2019 11:40)  T(F): 98.8 (11 Mar 2019 11:40), Max: 98.8 (11 Mar 2019 11:40)  HR: 80 (11 Mar 2019 11:40) (77 - 80)  BP: 117/63 (11 Mar 2019 11:40) (117/63 - 150/73)  RR: 18 (11 Mar 2019 11:40) (18 - 20)  SpO2: 100% (11 Mar 2019 11:40) (100% - 100%)    PE:  Alert, responsive, in no acute distress. Observed patient walking down hallway to bathroom independently  Back: Incision low back dressing mild serosang drainage. Dressing removed, steristrips intact, incision healing well, no ecchymosis, erythema. Incision area cleaned with betadine swab and dried with sterile gauze, new honeycomb dressing placed over incision. No significant swelling or fluctance around low back/incisional area noted    Pathologic reflexes: B/L -->[negative] babinski,  [negative]  Clonus              Motor exam:                      Lower extremeity                           HF(l2)   KE(l3)    TA(l4)   EHL(l5)     GS(s1)                                                   R                      5/5        5/5        5/5       5/5         5/5                                                 L                      5/5        5/5       5/5       5/5          5/5    SILT L2-S2 intact B/L, DP pulses 2+ B/L  Calf soft, NT B/L    A/P:  Pt is a  63y Male with exacerbation of operative pain s/p lumbar laminectomy POD#5    PLAN:  ·	Pain control - recommend IV tylenol, toradol, muscle relaxer, gabapentin  ·	No further imaging indicated at this time  ·	PT - patient to mobilize once pain better controlled with recommended pain medication  ·	Patient to be re-evaluated this evening, if symptoms improve and patient is cmfortable, may be discharged home and follow-up in office with Dr. Chen tomorrow 3/12/19. If pain persists and patient feels he is unable to return home then will likely need social admission for pain control and rehab placement  ·	D/w Dr. Chen   ·	D/w Dr. Higuera

## 2019-03-11 NOTE — ED STATDOCS - OBJECTIVE STATEMENT
62 y/o M pt 1 day s/p laminectomy with PMHx DM (on metformin), thrombocytopenia (unknown origin) presents to the ED c/o severe back pain.  Pt had laminectomy March 10th, was discharged home yesterday.  He notes pain since the surgery, gradually worsening today.  He notes excruciating pain today, unable to ambulate without severe pain.  Pt had a near syncopal episode at home this morning, reports feeling fatigued and weak currently.  He checked his blood sugar at home, found to be 230.  Pt denies urinary symptoms, unable to remember if he had a BM today.  Pt  took oxycodone 10mg at home today.  He lives alone at home.  Exam: pt appears to be in severe pain, pain while sitting, ambulating with extreme discomfort. surgical dressing in place to lumbar region.  Pt unable to sit for a long time, will sent pt to [facility].  Give pain control, obtain labs, cardiac monitor, EKG.

## 2019-03-14 ENCOUNTER — APPOINTMENT (OUTPATIENT)
Dept: ORTHOPEDIC SURGERY | Facility: CLINIC | Age: 64
End: 2019-03-14
Payer: MEDICAID

## 2019-03-14 VITALS
DIASTOLIC BLOOD PRESSURE: 75 MMHG | BODY MASS INDEX: 25.73 KG/M2 | SYSTOLIC BLOOD PRESSURE: 151 MMHG | WEIGHT: 190 LBS | HEART RATE: 79 BPM | HEIGHT: 72 IN

## 2019-03-14 PROCEDURE — 99024 POSTOP FOLLOW-UP VISIT: CPT

## 2019-03-14 PROCEDURE — 72100 X-RAY EXAM L-S SPINE 2/3 VWS: CPT

## 2019-03-14 NOTE — HISTORY OF PRESENT ILLNESS
[Doing Well] : is doing well [Excellent Pain Control] : has excellent pain control [No Sign of Infection] : is showing no signs of infection [Chills] : no chills [Fever] : no fever [de-identified] : s/p lumbar laminectomy L2, L3, L4. DOS: 3/6/19. [de-identified] : 63 year old M presents s/p lumbar laminectomy L2, L3, L4. DOS: 3/6/19. He was hospitalized MOnday due to pain. He was changed to Diludin and c/o bruising around his waist into the abdomen (belt). He states he has pain radiating down RLE up to the knee. He is taking muscle relaxers intermittently. Provided Gabapentin at the hospital, and he denies any fevers, chills. [de-identified] : constitutional- No acute distress\par neurologic-  RLE radiculitis.\par skin- incision dry clean and intact. \par musculoskeletal - motor strength is 5/5.\par \par Steri-strips still in place. Ecchymotic area b/l lumbar paraspinals/flank extending down b/l buttocks  the lower abdomen [de-identified] : X-ray of the lumbar spine taken today shows s/p 3 level lumbar laminectomy with no acute spondylolisthesis. [de-identified] : s/p lumbar laminectomy L2, L3, L4. DOS: 3/6/19. [de-identified] : 63 year old M presents s/p lumbar laminectomy L2, L3, L4. DOS: 3/6/19. We discussed no repetitive lifting, bending, and twisting. will continue a short course of Hydromorphone 2mg every 4-6 hours prn pain then will cut down and transition to oxycodone or hydrocodone for next refill. He will continue muscle relaxers as needed, TID prn. Gabapentin as prescribed. The patient will follow up in 3 weeks for a repeat clinical assessment. pt is having a problematic recovery given his hx ot etoh and living alone. He has no assistance for dressing changes etc

## 2019-03-14 NOTE — ADDENDUM
[FreeTextEntry1] : Documented by Reuben Teixeira acting as a scribe for Dr. Jam Chen on 03/14/2019 . All medical record entries made by the Scribe were at my, Dr. Jam Chen, direction and personally dictated by me on 03/14/2019 . I have reviewed the chart and agree that the record accurately reflects my personal performance of the history, physical exam, assessment and plan. I have also personally directed, reviewed, and agreed with the chart.

## 2019-03-21 ENCOUNTER — APPOINTMENT (OUTPATIENT)
Dept: ORTHOPEDIC SURGERY | Facility: CLINIC | Age: 64
End: 2019-03-21
Payer: MEDICAID

## 2019-03-21 VITALS
HEIGHT: 72 IN | SYSTOLIC BLOOD PRESSURE: 177 MMHG | DIASTOLIC BLOOD PRESSURE: 82 MMHG | HEART RATE: 79 BPM | WEIGHT: 190 LBS | BODY MASS INDEX: 25.73 KG/M2

## 2019-03-21 PROCEDURE — 99024 POSTOP FOLLOW-UP VISIT: CPT

## 2019-03-21 NOTE — HISTORY OF PRESENT ILLNESS
[0] : no pain reported [Chills] : no chills [Fever] : no fever [de-identified] : s/p lumbar laminectomy L2, L3, L4. DOS: 3/6/19.  [de-identified] : lumbar incision with dressing removed clean dry intact, steri strips removed and cleaned with chloraprep.  Dressing applied.  [de-identified] : no imaging [de-identified] : 62yo M presents s/p lumbar laminectomy L2, L3, L4. DOS: 3/6/19 for dressing change.  he feels improvement from pain with pain medication.  [de-identified] : pt is improving with post op pain he will f/u for 1 month post op appt [de-identified] : s/p lumbar laminectomy L2, L3, L4. DOS: 3/6/19.

## 2019-03-21 NOTE — HISTORY OF PRESENT ILLNESS
[0] : no pain reported [Fever] : no fever [de-identified] : s/p lumbar laminectomy L2, L3, L4. DOS: 3/6/19.  [Chills] : no chills [de-identified] : 62yo M presents s/p lumbar laminectomy L2, L3, L4. DOS: 3/6/19 for dressing change.  he feels improvement from pain with pain medication.  [de-identified] : lumbar incision with dressing removed clean dry intact, steri strips removed and cleaned with chloraprep.  Dressing applied.  [de-identified] : no imaging [de-identified] : s/p lumbar laminectomy L2, L3, L4. DOS: 3/6/19.  [de-identified] : pt is improving with post op pain he will f/u for 1 month post op appt

## 2019-03-21 NOTE — HISTORY OF PRESENT ILLNESS
[0] : no pain reported [Fever] : no fever [de-identified] : s/p lumbar laminectomy L2, L3, L4. DOS: 3/6/19.  [Chills] : no chills [de-identified] : lumbar incision with dressing removed clean dry intact, steri strips removed and cleaned with chloraprep.  Dressing applied.  [de-identified] : no imaging [de-identified] : 64yo M presents s/p lumbar laminectomy L2, L3, L4. DOS: 3/6/19 for dressing change.  he feels improvement from pain with pain medication.  [de-identified] : pt is improving with post op pain he will f/u for 1 month post op appt [de-identified] : s/p lumbar laminectomy L2, L3, L4. DOS: 3/6/19.

## 2019-04-08 ENCOUNTER — APPOINTMENT (OUTPATIENT)
Dept: ORTHOPEDIC SURGERY | Facility: CLINIC | Age: 64
End: 2019-04-08
Payer: MEDICAID

## 2019-04-08 VITALS
HEIGHT: 72 IN | WEIGHT: 190 LBS | BODY MASS INDEX: 25.73 KG/M2 | SYSTOLIC BLOOD PRESSURE: 150 MMHG | DIASTOLIC BLOOD PRESSURE: 74 MMHG | HEART RATE: 81 BPM

## 2019-04-08 DIAGNOSIS — Z98.890 OTHER SPECIFIED POSTPROCEDURAL STATES: ICD-10-CM

## 2019-04-08 PROCEDURE — 99024 POSTOP FOLLOW-UP VISIT: CPT

## 2019-04-08 RX ORDER — NAPROXEN 500 MG/1
TABLET ORAL
Refills: 0 | Status: DISCONTINUED | COMMUNITY
End: 2019-04-08

## 2019-04-08 RX ORDER — HYDROMORPHONE HYDROCHLORIDE 2 MG/1
2 TABLET ORAL
Qty: 60 | Refills: 0 | Status: DISCONTINUED | COMMUNITY
Start: 2019-03-14 | End: 2019-04-08

## 2019-04-08 RX ORDER — GABAPENTIN 100 MG/1
100 CAPSULE ORAL
Qty: 45 | Refills: 0 | Status: DISCONTINUED | COMMUNITY
Start: 2019-03-14 | End: 2019-04-08

## 2019-04-08 NOTE — HISTORY OF PRESENT ILLNESS
[Clean/Dry/Intact] : clean, dry and intact [Neuro Intact] : an unremarkable neurological exam [Vascular Intact] : ~T peripheral vascular exam normal [de-identified] : S/P lumbar laminectomy L2, L3, L4. DOS: 3/6/19. \par \par  [de-identified] : 62yo M s/p Lumbar laminectomy L2, L3, L4 post op one month.  He states he is doing well.  He has intermittent pain to the RLE but has been improving.  His back pain is improving.  He is not taking any pain medication and stopped gabapentin 1 wk ago. [de-identified] : Lumbar incision is well healed.  Mild TTP bilateral lumbar paraspinals.  Motor strength 5/5. [de-identified] : NO IMAGING TODAY [de-identified] : S/P lumbar laminectomy L2, L3, L4. DOS: 3/6/19.  [de-identified] : He will continue with walking activities, no heavy lifting. Tylenol and ibuprofen as needed for pain.  He will f/u in 1 month

## 2019-05-16 ENCOUNTER — APPOINTMENT (OUTPATIENT)
Dept: ORTHOPEDIC SURGERY | Facility: CLINIC | Age: 64
End: 2019-05-16

## 2019-12-24 NOTE — CHART NOTE - NSCHARTNOTEFT_GEN_A_CORE
called by nurse around 23:30 as patient's dressing was saturated.  Dressing removed.  New dressing applied which is c/d/i right rail up/right rail down

## 2020-06-01 ENCOUNTER — OUTPATIENT (OUTPATIENT)
Dept: OUTPATIENT SERVICES | Facility: HOSPITAL | Age: 65
LOS: 1 days | End: 2020-06-01
Payer: MEDICAID

## 2020-06-01 DIAGNOSIS — Z98.890 OTHER SPECIFIED POSTPROCEDURAL STATES: Chronic | ICD-10-CM

## 2020-06-01 DIAGNOSIS — Z95.810 PRESENCE OF AUTOMATIC (IMPLANTABLE) CARDIAC DEFIBRILLATOR: Chronic | ICD-10-CM

## 2020-06-15 PROCEDURE — G9001: CPT

## 2020-06-25 DIAGNOSIS — Z71.89 OTHER SPECIFIED COUNSELING: ICD-10-CM

## 2021-01-23 ENCOUNTER — APPOINTMENT (OUTPATIENT)
Dept: ORTHOPEDIC SURGERY | Facility: CLINIC | Age: 66
End: 2021-01-23
Payer: MEDICAID

## 2021-01-23 VITALS
HEART RATE: 75 BPM | TEMPERATURE: 96.1 F | HEIGHT: 72 IN | DIASTOLIC BLOOD PRESSURE: 86 MMHG | SYSTOLIC BLOOD PRESSURE: 176 MMHG | BODY MASS INDEX: 25.73 KG/M2 | WEIGHT: 190 LBS

## 2021-01-23 PROCEDURE — 99072 ADDL SUPL MATRL&STAF TM PHE: CPT

## 2021-01-23 PROCEDURE — 99215 OFFICE O/P EST HI 40 MIN: CPT | Mod: 25

## 2021-01-23 PROCEDURE — 20610 DRAIN/INJ JOINT/BURSA W/O US: CPT | Mod: LT

## 2021-01-23 PROCEDURE — 73562 X-RAY EXAM OF KNEE 3: CPT | Mod: LT

## 2021-01-23 NOTE — HISTORY OF PRESENT ILLNESS
[de-identified] : Dru is a 65-year-old male complaining of left knee pain.  Increased pain in the past 2 weeks. No trauma. Pain scale is 7/10. Patient seeking cortisone injection for pain relief. Patient is a diabetic but has had cortisone injections in the past his elbow without problems. He has no other issues today in the office.

## 2021-01-23 NOTE — PROCEDURE
[de-identified] : Laterality: Left Knee\par \par The risks and benefits of the injection were reviewed with the patient today in office and all their questions were answered.  The injection site was the anterolateral aspect of the knee with the patient sitting up, knees flexed to 90 degrees.  Prior to giving the injection the injection site was prepped with Chloraprep and a sterile field was created.  Sterile technique was carried out throughout the procedure.  After verbal consent from the patient we went ahead and injected the left knee today with 1 ml 40 mg Depo-Medrol, 5 ml 1% lidocaine and 4 ml of .50% Bupivacaine for a total of 10 ml with a 22 cesario 1.5" needle.  The medication was placed into the knee without complication.  Post injection the patient reported no pain, had a normal gait and good motion of the knee.  The patient denied numbness and tingling going down their leg.  There were no complications during the procedure.\par

## 2021-01-23 NOTE — PHYSICAL EXAM
[de-identified] : Left Knee Physical Examination:\par \par General: Alert and oriented x3.  In no acute distress.  Pleasant in nature with a normal affect.  No apparent respiratory distress. \par \par Erythema, Warmth, Rubor: Negative\par Swelling/Edema: Positive\par ROM: 0-120 degrees, pain with hyperflexion. \par Stormy's Test: Positive \par Medial Joint Line TTP: Positive\par Lateral Joint Line TTP: Negative\par Lachman Exam/Anterior Drawer/Pivot Shift Test: Negative \par MCL Pain: Negative\par LCL Pain: Negative\par Iliotibial Band Pain: Negative\par Patellofemoral Joint Pain: Negative\par Patellar Tendon TTP: Negative\par Pes Anserinus TTP: Negative\par Homans Sign: Negative\par Posterior Knee Pain: Negative\par Neuro: Intact with no sensory or motor deficits\par  [de-identified] : X-rays of the left knee reviewed: Osteoarthritis present.  No fractures.

## 2021-02-04 ENCOUNTER — APPOINTMENT (OUTPATIENT)
Dept: ORTHOPEDIC SURGERY | Facility: CLINIC | Age: 66
End: 2021-02-04

## 2021-02-06 ENCOUNTER — APPOINTMENT (OUTPATIENT)
Dept: ORTHOPEDIC SURGERY | Facility: CLINIC | Age: 66
End: 2021-02-06
Payer: MEDICARE

## 2021-02-06 VITALS
SYSTOLIC BLOOD PRESSURE: 174 MMHG | BODY MASS INDEX: 25.73 KG/M2 | WEIGHT: 190 LBS | DIASTOLIC BLOOD PRESSURE: 79 MMHG | HEART RATE: 67 BPM | HEIGHT: 72 IN

## 2021-02-06 DIAGNOSIS — M17.12 UNILATERAL PRIMARY OSTEOARTHRITIS, LEFT KNEE: ICD-10-CM

## 2021-02-06 PROCEDURE — 99213 OFFICE O/P EST LOW 20 MIN: CPT | Mod: 25

## 2021-02-06 PROCEDURE — 20610 DRAIN/INJ JOINT/BURSA W/O US: CPT | Mod: LT

## 2021-02-06 PROCEDURE — 99072 ADDL SUPL MATRL&STAF TM PHE: CPT

## 2021-03-01 ENCOUNTER — APPOINTMENT (OUTPATIENT)
Dept: ORTHOPEDIC SURGERY | Facility: CLINIC | Age: 66
End: 2021-03-01

## 2021-12-22 NOTE — ED PROVIDER NOTE - CROS ED [facility] ALL NEG
Last OV: 12/2/21 annual PE- [address]    No future appointments. Latest labs: 12/2/21 Lipid, CMP, CBC and PSA. Pending to complete are PSA and CBC    Latest RX: CLONAZEPAM 1 MG TABLET 90 tabs 0 refills on 10/1/21    Per protocol, not on protocol. Rx pending.
- - -

## 2022-04-29 NOTE — DISCUSSION/SUMMARY
[de-identified] : Assessment: Left Knee Osteoarthritis/Pain\par \par Plan:\par 1. RICE Therapy.\par 2. Antiinflammatories/Tylenol as needed for pain of discomfort. \par 3. The patient was advised to rest the knee for 24-48 hours post injection.  The patient is able to resume normal activities in 24-48 hours post injection if the patient is experiencing minimal to no pain. \par 4. Continue with a home exercise/stretching program. \par 5. All the patients questions were answered.  If the patient is experiencing any problems or has concerns they were advised to call the office or make an appointment to come in to be evaluated.\par \par 
no

## 2023-01-04 ENCOUNTER — APPOINTMENT (OUTPATIENT)
Dept: INTERVENTIONAL RADIOLOGY/VASCULAR | Facility: CLINIC | Age: 68
End: 2023-01-04
Payer: MEDICARE

## 2023-01-04 DIAGNOSIS — Z63.5 DISRUPTION OF FAMILY BY SEPARATION AND DIVORCE: ICD-10-CM

## 2023-01-04 DIAGNOSIS — M16.11 UNILATERAL PRIMARY OSTEOARTHRITIS, RIGHT HIP: ICD-10-CM

## 2023-01-04 DIAGNOSIS — Z60.2 PROBLEMS RELATED TO LIVING ALONE: ICD-10-CM

## 2023-01-04 DIAGNOSIS — M48.062 SPINAL STENOSIS, LUMBAR REGION WITH NEUROGENIC CLAUDICATION: ICD-10-CM

## 2023-01-04 DIAGNOSIS — M25.562 PAIN IN LEFT KNEE: ICD-10-CM

## 2023-01-04 DIAGNOSIS — M25.551 PAIN IN RIGHT HIP: ICD-10-CM

## 2023-01-04 PROCEDURE — 99447 NTRPROF PH1/NTRNET/EHR 11-20: CPT

## 2023-01-04 RX ORDER — ATORVASTATIN CALCIUM 10 MG/1
10 TABLET, FILM COATED ORAL
Refills: 0 | Status: ACTIVE | COMMUNITY

## 2023-01-04 RX ORDER — CHROMIUM 200 MCG
TABLET ORAL
Refills: 0 | Status: ACTIVE | COMMUNITY

## 2023-01-04 RX ORDER — B-COMPLEX WITH VITAMIN C
TABLET ORAL
Refills: 0 | Status: ACTIVE | COMMUNITY

## 2023-01-04 RX ORDER — OXYCODONE AND ACETAMINOPHEN 5; 325 MG/1; MG/1
5-325 TABLET ORAL
Refills: 0 | Status: DISCONTINUED | COMMUNITY
End: 2023-01-04

## 2023-01-04 SDOH — SOCIAL STABILITY - SOCIAL INSECURITY: PROBLEMS RELATED TO LIVING ALONE: Z60.2

## 2023-01-04 SDOH — SOCIAL STABILITY - SOCIAL INSECURITY: DISRUPTION OF FAMILY BY SEPARATION AND DIVORCE: Z63.5

## 2023-01-05 ENCOUNTER — RESULT REVIEW (OUTPATIENT)
Age: 68
End: 2023-01-05

## 2023-01-06 ENCOUNTER — APPOINTMENT (OUTPATIENT)
Dept: CT IMAGING | Facility: CLINIC | Age: 68
End: 2023-01-06
Payer: MEDICARE

## 2023-01-06 ENCOUNTER — OUTPATIENT (OUTPATIENT)
Dept: OUTPATIENT SERVICES | Facility: HOSPITAL | Age: 68
LOS: 1 days | End: 2023-01-06
Payer: COMMERCIAL

## 2023-01-06 DIAGNOSIS — Z95.810 PRESENCE OF AUTOMATIC (IMPLANTABLE) CARDIAC DEFIBRILLATOR: Chronic | ICD-10-CM

## 2023-01-06 DIAGNOSIS — Z98.890 OTHER SPECIFIED POSTPROCEDURAL STATES: Chronic | ICD-10-CM

## 2023-01-06 DIAGNOSIS — Z00.8 ENCOUNTER FOR OTHER GENERAL EXAMINATION: ICD-10-CM

## 2023-01-06 DIAGNOSIS — K70.30 ALCOHOLIC CIRRHOSIS OF LIVER WITHOUT ASCITES: ICD-10-CM

## 2023-01-06 DIAGNOSIS — R18.8 OTHER ASCITES: ICD-10-CM

## 2023-01-06 PROCEDURE — 74160 CT ABDOMEN W/CONTRAST: CPT | Mod: 26

## 2023-01-06 PROCEDURE — 74160 CT ABDOMEN W/CONTRAST: CPT

## 2023-01-09 LAB
ALBUMIN SERPL ELPH-MCNC: 3 G/DL
ALP BLD-CCNC: 118 U/L
ALT SERPL-CCNC: 57 U/L
ANION GAP SERPL CALC-SCNC: 11 MMOL/L
AST SERPL-CCNC: 51 U/L
BASOPHILS # BLD AUTO: 0.02 K/UL
BASOPHILS NFR BLD AUTO: 0.5 %
BILIRUB SERPL-MCNC: 0.5 MG/DL
BUN SERPL-MCNC: 64 MG/DL
CALCIUM SERPL-MCNC: 9.5 MG/DL
CHLORIDE SERPL-SCNC: 102 MMOL/L
CO2 SERPL-SCNC: 17 MMOL/L
CREAT SERPL-MCNC: 1.48 MG/DL
EGFR: 52 ML/MIN/1.73M2
EOSINOPHIL # BLD AUTO: 0.07 K/UL
EOSINOPHIL NFR BLD AUTO: 1.6 %
GLUCOSE SERPL-MCNC: 160 MG/DL
HCT VFR BLD CALC: 32.3 %
HGB BLD-MCNC: 10.7 G/DL
IMM GRANULOCYTES NFR BLD AUTO: 1.6 %
INR PPP: 1.13 RATIO
LYMPHOCYTES # BLD AUTO: 0.6 K/UL
LYMPHOCYTES NFR BLD AUTO: 14 %
MAN DIFF?: NORMAL
MCHC RBC-ENTMCNC: 33.1 GM/DL
MCHC RBC-ENTMCNC: 34 PG
MCV RBC AUTO: 102.5 FL
MONOCYTES # BLD AUTO: 0.58 K/UL
MONOCYTES NFR BLD AUTO: 13.5 %
NEUTROPHILS # BLD AUTO: 2.95 K/UL
NEUTROPHILS NFR BLD AUTO: 68.8 %
PLATELET # BLD AUTO: 149 K/UL
POTASSIUM SERPL-SCNC: 6.4 MMOL/L
PROT SERPL-MCNC: 6.2 G/DL
PT BLD: 13.1 SEC
RBC # BLD: 3.15 M/UL
RBC # FLD: 14.5 %
SODIUM SERPL-SCNC: 129 MMOL/L
WBC # FLD AUTO: 4.29 K/UL

## 2023-01-10 ENCOUNTER — NON-APPOINTMENT (OUTPATIENT)
Age: 68
End: 2023-01-10

## 2023-01-23 ENCOUNTER — APPOINTMENT (OUTPATIENT)
Dept: INTERVENTIONAL RADIOLOGY/VASCULAR | Facility: CLINIC | Age: 68
End: 2023-01-23

## 2023-02-06 ENCOUNTER — NON-APPOINTMENT (OUTPATIENT)
Age: 68
End: 2023-02-06

## 2023-02-06 ENCOUNTER — APPOINTMENT (OUTPATIENT)
Dept: INTERVENTIONAL RADIOLOGY/VASCULAR | Facility: CLINIC | Age: 68
End: 2023-02-06
Payer: MEDICARE

## 2023-02-06 ENCOUNTER — APPOINTMENT (OUTPATIENT)
Dept: CV DIAGNOSITCS | Facility: HOSPITAL | Age: 68
End: 2023-02-06

## 2023-02-06 PROCEDURE — 99443: CPT

## 2023-02-07 ENCOUNTER — NON-APPOINTMENT (OUTPATIENT)
Age: 68
End: 2023-02-07

## 2023-02-08 ENCOUNTER — APPOINTMENT (OUTPATIENT)
Dept: HEPATOLOGY | Facility: CLINIC | Age: 68
End: 2023-02-08

## 2023-02-14 ENCOUNTER — LABORATORY RESULT (OUTPATIENT)
Age: 68
End: 2023-02-14

## 2023-02-14 ENCOUNTER — APPOINTMENT (OUTPATIENT)
Dept: HEPATOLOGY | Facility: CLINIC | Age: 68
End: 2023-02-14
Payer: MEDICARE

## 2023-02-14 VITALS
TEMPERATURE: 97.8 F | OXYGEN SATURATION: 100 % | HEIGHT: 72 IN | WEIGHT: 171 LBS | RESPIRATION RATE: 14 BRPM | DIASTOLIC BLOOD PRESSURE: 82 MMHG | BODY MASS INDEX: 23.16 KG/M2 | HEART RATE: 74 BPM | SYSTOLIC BLOOD PRESSURE: 132 MMHG

## 2023-02-14 PROCEDURE — 99205 OFFICE O/P NEW HI 60 MIN: CPT

## 2023-02-14 RX ORDER — CYCLOBENZAPRINE HYDROCHLORIDE 7.5 MG/1
TABLET, FILM COATED ORAL
Refills: 0 | Status: DISCONTINUED | COMMUNITY
End: 2023-02-14

## 2023-02-14 RX ORDER — ATORVASTATIN CALCIUM 80 MG/1
TABLET, FILM COATED ORAL
Refills: 0 | Status: DISCONTINUED | COMMUNITY
End: 2023-02-14

## 2023-02-15 LAB
AFP-TM SERPL-MCNC: ABNORMAL NG/ML
ALBUMIN SERPL ELPH-MCNC: 3.5 G/DL
ALP BLD-CCNC: 101 U/L
ALT SERPL-CCNC: 47 U/L
ANION GAP SERPL CALC-SCNC: 11 MMOL/L
AST SERPL-CCNC: 40 U/L
BASOPHILS # BLD AUTO: 0.03 K/UL
BASOPHILS NFR BLD AUTO: 0.8 %
BILIRUB SERPL-MCNC: 0.6 MG/DL
BUN SERPL-MCNC: 73 MG/DL
CALCIUM SERPL-MCNC: 9.1 MG/DL
CHLORIDE SERPL-SCNC: 109 MMOL/L
CO2 SERPL-SCNC: 16 MMOL/L
CREAT SERPL-MCNC: 1.18 MG/DL
EGFR: 68 ML/MIN/1.73M2
EOSINOPHIL # BLD AUTO: 0.04 K/UL
EOSINOPHIL NFR BLD AUTO: 1 %
GLUCOSE SERPL-MCNC: 169 MG/DL
HCT VFR BLD CALC: 28.9 %
HGB BLD-MCNC: 9.4 G/DL
IMM GRANULOCYTES NFR BLD AUTO: 1 %
INR PPP: 1.18 RATIO
LYMPHOCYTES # BLD AUTO: 0.52 K/UL
LYMPHOCYTES NFR BLD AUTO: 13.4 %
MAN DIFF?: NORMAL
MCHC RBC-ENTMCNC: 32.5 GM/DL
MCHC RBC-ENTMCNC: 35.2 PG
MCV RBC AUTO: 108.2 FL
MONOCYTES # BLD AUTO: 0.5 K/UL
MONOCYTES NFR BLD AUTO: 12.9 %
NEUTROPHILS # BLD AUTO: 2.74 K/UL
NEUTROPHILS NFR BLD AUTO: 70.9 %
PLATELET # BLD AUTO: 115 K/UL
POTASSIUM SERPL-SCNC: 5.5 MMOL/L
PROT SERPL-MCNC: 6.5 G/DL
PT BLD: 13.7 SEC
RBC # BLD: 2.67 M/UL
RBC # FLD: 14.2 %
SODIUM SERPL-SCNC: 135 MMOL/L
WBC # FLD AUTO: 3.87 K/UL

## 2023-02-15 NOTE — REVIEW OF SYSTEMS
[Abdominal Pain] : abdominal pain [Negative] : Heme/Lymph [Vomiting] : no vomiting [Constipation] : no constipation [Diarrhea] : no diarrhea [Heartburn] : no heartburn [Melena] : no melena [FreeTextEntry7] : abd distention

## 2023-02-15 NOTE — PHYSICAL EXAM
[Abdominal  Ascites] : ascites [Ascites Fluid Wave] : positive ascites fluid wave [Ascites Tense] : ascites is tense [Sclera] : the sclera and conjunctiva were normal [PERRL With Normal Accommodation] : pupils were equal in size, round, and reactive to light [Extraocular Movements] : extraocular movements were intact [Neck Appearance] : the appearance of the neck was normal [Neck Cervical Mass (___cm)] : no neck mass was observed [Jugular Venous Distention Increased] : there was no jugular-venous distention [Thyroid Diffuse Enlargement] : the thyroid was not enlarged [Thyroid Nodule] : there were no palpable thyroid nodules [] : no respiratory distress [Auscultation Breath Sounds / Voice Sounds] : lungs were clear to auscultation bilaterally [Heart Rate And Rhythm] : heart rate was normal and rhythm regular [Heart Sounds] : normal S1 and S2 [Heart Sounds Gallop] : no gallops [Murmurs] : no murmurs [Heart Sounds Pericardial Friction Rub] : no pericardial rub [Abnormal Walk] : normal gait [Nail Clubbing] : no clubbing  or cyanosis of the fingernails [Musculoskeletal - Swelling] : no joint swelling seen [Motor Tone] : muscle strength and tone were normal [Deep Tendon Reflexes (DTR)] : deep tendon reflexes were 2+ and symmetric [Sensation] : the sensory exam was normal to light touch and pinprick [No Focal Deficits] : no focal deficits [Oriented To Time, Place, And Person] : oriented to person, place, and time [Impaired Insight] : insight and judgment were intact [Affect] : the affect was normal [Scleral Icterus] : No Scleral Icterus [Spider Angioma] : No spider angioma(s) were observed [Asterixis] : no asterixis observed [Jaundice] : No jaundice [Depression] : no depression [Hallucinations] : ~T no ~M hallucinations [FreeTextEntry1] : distended tense

## 2023-02-15 NOTE — HISTORY OF PRESENT ILLNESS
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 Reports last drink was Spring 2022. He denies hx of SBP\par \par Lives alone & states he is self sufficient conducting all ADLs. States he has help available - friends nearby - should he need it. No close family nearby\par Reports quality of life is compromised 2/2 recurrent ascites requiring frequent LVPs.\par He was initially referred to IR for TIPS eval but sent to us for evaluation for HCC treatment and establishment of OLT candidacy\par \par He denies abdominal pain, scleral or dermal discoloration,  LE edema, altered mentation, hematemesis, hemoptysis, hematochezia, melena,  n/v/d, unintentional weight loss or gain but reports abdominal distention & recurrent ascites and pain related to this\par His weight has been stable\par His last paracentesis was yesterday\par Denies nsaids or a/c usage.\par Former gaspar\par \par Colonoscopy 8/2022 - stool in colon, small polyp 5mm, rectal varices, internal hemorrhoids\par EGD - EV banded and eradicated, PHG with gastritis, duodenal nodule\par +FHX of colon cancer\par \par PCP Dr Chu White [phone]\par Cardiac Dr Mata [phone]\par GI: Sukhjinder Salazar - [phone]\par Oncology - Brannon Kee - [phone]\par \par patient was referred to [facility] for OLT eval in spring/summer\par reportedly underwent eval but no treatment of HCC was discussed\par Insurance no longer covered at [facility] and patient lost to follow up though have to clarify records\par Unclear how much patient remembers from evaluation\par Unclear what type of metastatic workup has been done

## 2023-02-15 NOTE — ASSESSMENT
[FreeTextEntry1] : 67M with decompensated ETOH Cirrhosis - recurrent ascites requriing LVP every week\par He has enlarging liver masses consistent with multifocal HCC\par His should be able to tolerate loco-regional therapy with y90 of dominant lesion\par He has to complete metastatic workup and we need to clarify what was discussed at [facility] in regards to transplant\par I spoke with IR in regards to delaying TIPS eval\par Patient should continue serial paracentesis for now \par \par There are several issues that have to be discussed with patient\par \par 1) HCC treatment - enlarging since fall - no treatment at this time\par 2) If patient does not have metastatic disease or local invasion and locoregional therapy can be completed and tolerated - patient can theoretically be downstaged to be within [place] Criteria to qualify for HCC Exception points for transplant\par 3) Pyschosocial issues need to be discussed - appears support maybe limited in this area\par 4) Reported hx of rectal cancer needs to be clarified with oncology to determine transplant candidacy as well\par \par If Transplant is not an option would elect treatment of HCC followed by TIPS if anatomically feasible given diuretic intolerant ascites \par Needs updated ECHO to assess cardiac function given hx of ICD and NICM\par \par RTC 2-3 weeks - will discuss case at tumor board\par \par Aniket Loza MD

## 2023-02-16 ENCOUNTER — NON-APPOINTMENT (OUTPATIENT)
Age: 68
End: 2023-02-16

## 2023-03-06 ENCOUNTER — APPOINTMENT (OUTPATIENT)
Dept: CT IMAGING | Facility: CLINIC | Age: 68
End: 2023-03-06

## 2023-03-06 ENCOUNTER — APPOINTMENT (OUTPATIENT)
Dept: NUCLEAR MEDICINE | Facility: CLINIC | Age: 68
End: 2023-03-06

## 2023-03-15 ENCOUNTER — APPOINTMENT (OUTPATIENT)
Dept: HEPATOLOGY | Facility: CLINIC | Age: 68
End: 2023-03-15
Payer: MEDICARE

## 2023-03-15 VITALS
RESPIRATION RATE: 16 BRPM | OXYGEN SATURATION: 100 % | SYSTOLIC BLOOD PRESSURE: 143 MMHG | BODY MASS INDEX: 21.94 KG/M2 | HEIGHT: 72 IN | DIASTOLIC BLOOD PRESSURE: 83 MMHG | TEMPERATURE: 98.1 F | HEART RATE: 88 BPM | WEIGHT: 162 LBS

## 2023-03-15 PROCEDURE — 99215 OFFICE O/P EST HI 40 MIN: CPT

## 2023-03-17 NOTE — HISTORY OF PRESENT ILLNESS
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 Reports last drink was Spring 2022. He denies hx of SBP\par \par Lives alone & states he is self sufficient conducting all ADLs. States he has help available - friends nearby - should he need it. No close family nearby\par Reports quality of life is compromised 2/2 recurrent ascites requiring frequent LVPs.\par He was initially referred to IR for TIPS eval but sent to us for evaluation for HCC treatment and establishment of OLT candidacy\par \par He denies abdominal pain, scleral or dermal discoloration,  LE edema, altered mentation, hematemesis, hemoptysis, hematochezia, melena,  n/v/d, unintentional weight loss or gain but reports abdominal distention & recurrent ascites and pain related to this\par His weight has been stable\par His last paracentesis was yesterday\par Denies nsaids or a/c usage.\par Former gaspar\par \par Colonoscopy 8/2022 - stool in colon, small polyp 5mm, rectal varices, internal hemorrhoids\par EGD - EV banded and eradicated, PHG with gastritis, duodenal nodule\par +FHX of colon cancer\par \par PCP Dr Chu White [phone]\par Cardiac Dr Mata [phone]\par GI: Sukhjinder Salazar - [phone]\par Oncology - Brannon Kee - [phone]\par \par patient was referred to [facility] for OLT eval in spring/summer\par reportedly underwent eval but no treatment of HCC was discussed\par Insurance no longer covered at [facility] and patient lost to follow up though have to clarify records\par Unclear how much patient remembers from evaluation\par Unclear what type of metastatic workup has been done\par \par Had Tumor board discussion\par Getting LVP every week\par Finally spoke with Onc from [facility]\par They want to proceed with y90\par Has blood work pending tomorrow \par He continues to lose weight but still functional at this time

## 2023-03-17 NOTE — ASSESSMENT
[FreeTextEntry1] : 67M with decompensated ETOH Cirrhosis - recurrent ascites requiring LVP every week\par He has enlarging liver masses consistent with multifocal HCC with worsening AFP\par His should be able to tolerate loco-regional therapy with y90 of dominant lesion - d/w IR\par \par There are several issues that have to be discussed with patient\par \par 1) HCC treatment - enlarging since fall - no treatment at this time - d/w IR - plan for y90. ECOG 0\par 2) Awaiting Bone scan - currently on appeal\par 3) Continue serial paracentesis for volume management - remains off diuretics due to hypokalemia\par \par Patient continues to have issues possibly following with us at [address]\par Dr. Medellin's office is closer -  i will communicate with her\par WIll d/w IR for y90 mapping\par \par RTC 1 month if He cant be seen in [place]

## 2023-03-17 NOTE — PHYSICAL EXAM
[General Appearance - Alert] : alert [General Appearance - In No Acute Distress] : in no acute distress [Sclera] : the sclera and conjunctiva were normal [PERRL With Normal Accommodation] : pupils were equal in size, round, and reactive to light [] : no respiratory distress [Auscultation Breath Sounds / Voice Sounds] : lungs were clear to auscultation bilaterally [Heart Rate And Rhythm] : heart rate was normal and rhythm regular [Heart Sounds] : normal S1 and S2 [Heart Sounds Gallop] : no gallops [Murmurs] : no murmurs [Heart Sounds Pericardial Friction Rub] : no pericardial rub [FreeTextEntry1] : distende abd soft no guarding or rebeound  [No CVA Tenderness] : no ~M costovertebral angle tenderness [No Spinal Tenderness] : no spinal tenderness [Abnormal Walk] : normal gait [Nail Clubbing] : no clubbing  or cyanosis of the fingernails [Musculoskeletal - Swelling] : no joint swelling seen [Motor Tone] : muscle strength and tone were normal [Deep Tendon Reflexes (DTR)] : deep tendon reflexes were 2+ and symmetric [Sensation] : the sensory exam was normal to light touch and pinprick [No Focal Deficits] : no focal deficits [Oriented To Time, Place, And Person] : oriented to person, place, and time [Impaired Insight] : insight and judgment were intact [Affect] : the affect was normal

## 2023-03-22 ENCOUNTER — APPOINTMENT (OUTPATIENT)
Dept: INTERVENTIONAL RADIOLOGY/VASCULAR | Facility: CLINIC | Age: 68
End: 2023-03-22
Payer: MEDICARE

## 2023-03-22 DIAGNOSIS — Z01.818 ENCOUNTER FOR OTHER PREPROCEDURAL EXAMINATION: ICD-10-CM

## 2023-03-22 DIAGNOSIS — R93.2 ABNORMAL FINDINGS ON DIAGNOSTIC IMAGING OF LIVER AND BILIARY TRACT: ICD-10-CM

## 2023-03-22 DIAGNOSIS — Z95.810 PRESENCE OF AUTOMATIC (IMPLANTABLE) CARDIAC DEFIBRILLATOR: ICD-10-CM

## 2023-03-22 DIAGNOSIS — R77.2 ABNORMALITY OF ALPHAFETOPROTEIN: ICD-10-CM

## 2023-03-22 DIAGNOSIS — K64.9 UNSPECIFIED HEMORRHOIDS: ICD-10-CM

## 2023-03-22 PROCEDURE — 99443: CPT | Mod: 95

## 2023-03-22 RX ORDER — PANTOPRAZOLE 40 MG/1
40 TABLET, DELAYED RELEASE ORAL
Qty: 35 | Refills: 0 | Status: ACTIVE | COMMUNITY
Start: 2023-03-22 | End: 1900-01-01

## 2023-04-06 ENCOUNTER — OUTPATIENT (OUTPATIENT)
Dept: OUTPATIENT SERVICES | Facility: HOSPITAL | Age: 68
LOS: 1 days | End: 2023-04-06
Payer: MEDICARE

## 2023-04-06 VITALS
WEIGHT: 167.99 LBS | RESPIRATION RATE: 18 BRPM | OXYGEN SATURATION: 100 % | TEMPERATURE: 98 F | DIASTOLIC BLOOD PRESSURE: 85 MMHG | HEIGHT: 72 IN | HEART RATE: 105 BPM | SYSTOLIC BLOOD PRESSURE: 158 MMHG

## 2023-04-06 DIAGNOSIS — E11.9 TYPE 2 DIABETES MELLITUS WITHOUT COMPLICATIONS: ICD-10-CM

## 2023-04-06 DIAGNOSIS — Z98.890 OTHER SPECIFIED POSTPROCEDURAL STATES: Chronic | ICD-10-CM

## 2023-04-06 DIAGNOSIS — Z95.810 PRESENCE OF AUTOMATIC (IMPLANTABLE) CARDIAC DEFIBRILLATOR: Chronic | ICD-10-CM

## 2023-04-06 DIAGNOSIS — C22.0 LIVER CELL CARCINOMA: ICD-10-CM

## 2023-04-06 LAB
A1C WITH ESTIMATED AVERAGE GLUCOSE RESULT: 6.5 % — HIGH (ref 4–5.6)
ALBUMIN SERPL ELPH-MCNC: 3.4 G/DL — SIGNIFICANT CHANGE UP (ref 3.3–5)
ALP SERPL-CCNC: 146 U/L — HIGH (ref 40–120)
ALT FLD-CCNC: 60 U/L — HIGH (ref 10–45)
ANION GAP SERPL CALC-SCNC: 12 MMOL/L — SIGNIFICANT CHANGE UP (ref 5–17)
AST SERPL-CCNC: 62 U/L — HIGH (ref 10–40)
BILIRUB SERPL-MCNC: 1.4 MG/DL — HIGH (ref 0.2–1.2)
BUN SERPL-MCNC: 38 MG/DL — HIGH (ref 7–23)
CALCIUM SERPL-MCNC: 9.4 MG/DL — SIGNIFICANT CHANGE UP (ref 8.4–10.5)
CHLORIDE SERPL-SCNC: 91 MMOL/L — LOW (ref 96–108)
CO2 SERPL-SCNC: 18 MMOL/L — LOW (ref 22–31)
CREAT SERPL-MCNC: 1.39 MG/DL — HIGH (ref 0.5–1.3)
EGFR: 56 ML/MIN/1.73M2 — LOW
ESTIMATED AVERAGE GLUCOSE: 140 MG/DL — HIGH (ref 68–114)
GLUCOSE SERPL-MCNC: 138 MG/DL — HIGH (ref 70–99)
HCT VFR BLD CALC: 38.2 % — LOW (ref 39–50)
HGB BLD-MCNC: 13.1 G/DL — SIGNIFICANT CHANGE UP (ref 13–17)
MCHC RBC-ENTMCNC: 34.1 PG — HIGH (ref 27–34)
MCHC RBC-ENTMCNC: 34.3 GM/DL — SIGNIFICANT CHANGE UP (ref 32–36)
MCV RBC AUTO: 99.5 FL — SIGNIFICANT CHANGE UP (ref 80–100)
NRBC # BLD: 0 /100 WBCS — SIGNIFICANT CHANGE UP (ref 0–0)
PLATELET # BLD AUTO: 187 K/UL — SIGNIFICANT CHANGE UP (ref 150–400)
POTASSIUM SERPL-MCNC: 6 MMOL/L — HIGH (ref 3.5–5.3)
POTASSIUM SERPL-SCNC: 6 MMOL/L — HIGH (ref 3.5–5.3)
PROT SERPL-MCNC: 7.1 G/DL — SIGNIFICANT CHANGE UP (ref 6–8.3)
RBC # BLD: 3.84 M/UL — LOW (ref 4.2–5.8)
RBC # FLD: 13.4 % — SIGNIFICANT CHANGE UP (ref 10.3–14.5)
SODIUM SERPL-SCNC: 121 MMOL/L — LOW (ref 135–145)
WBC # BLD: 5.28 K/UL — SIGNIFICANT CHANGE UP (ref 3.8–10.5)
WBC # FLD AUTO: 5.28 K/UL — SIGNIFICANT CHANGE UP (ref 3.8–10.5)

## 2023-04-06 PROCEDURE — 83036 HEMOGLOBIN GLYCOSYLATED A1C: CPT

## 2023-04-06 PROCEDURE — 85027 COMPLETE CBC AUTOMATED: CPT

## 2023-04-06 PROCEDURE — 80053 COMPREHEN METABOLIC PANEL: CPT

## 2023-04-06 PROCEDURE — G0463: CPT

## 2023-04-06 RX ORDER — CARVEDILOL PHOSPHATE 80 MG/1
1 CAPSULE, EXTENDED RELEASE ORAL
Qty: 0 | Refills: 0 | DISCHARGE

## 2023-04-06 RX ORDER — VITAMIN E 100 UNIT/G
0 CREAM (GRAM) TOPICAL
Refills: 0 | DISCHARGE

## 2023-04-06 RX ORDER — LISINOPRIL 2.5 MG/1
1 TABLET ORAL
Qty: 0 | Refills: 0 | DISCHARGE

## 2023-04-06 NOTE — H&P PST ADULT - PRO TOBACCO TYPE
Quit 40 years ago. Smoked 2-3 cigarrettes for 3 years. Quit 40 years ago. Smoked 2-3 cigarettes for 3 years.

## 2023-04-06 NOTE — H&P PST ADULT - NSICDXPASTSURGICALHX_GEN_ALL_CORE_FT
PAST SURGICAL HISTORY:  AICD (automatic cardioverter/defibrillator) present     H/O excision of mass back 2012

## 2023-04-06 NOTE — H&P PST ADULT - HISTORY OF PRESENT ILLNESS
This is a 67 year old male w.             Denies History of covid infection.  Covid Vaccinated and boosted. This is a 67 year old male w.  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 Evaluated by IR for LDT consultation. Presentiing to PST for scheduled Mapping and Shunt Study on 4/13/23 by Dr. Baron .          Mr Viramontes continues to deny abdominal pain, scleral or dermal discoloration, LE edema, altered mentation, hematemesis, hemoptysis, hematochezia, melena, n/v/d, unintentional weight loss or gain but reports abdominal distention & recurrent ascites.          Denies History of covid infection.  Covid Vaccinated and boosted. This is a 67 year old male w.  HTN, HLD,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 Evaluated by IR for LDT consultation. Presenting to PST for scheduled Mapping and Shunt Study on 4/13/23 by Dr. Baron .      Pt reports abdominal discomfort and distention 2/2 ascites- denies vomiting, diarrhea, chest pain, palpitation.       Denies History of covid infection.

## 2023-04-06 NOTE — H&P PST ADULT - NEUROLOGICAL
details… normal/sensation intact/responds to pain/responds to verbal commands/no spontaneous movement

## 2023-04-06 NOTE — H&P PST ADULT - MUSCULOSKELETAL
details… no joint swelling/no joint warmth/no calf tenderness/normal gait/strength 5/5 bilateral lower extremities

## 2023-04-06 NOTE — H&P PST ADULT - NSICDXFAMILYHX_GEN_ALL_CORE_FT
FAMILY HISTORY:  Father  Still living? Unknown  Family history of colon cancer, Age at diagnosis: Age Unknown    Mother  Still living? Unknown  Family history of gastric cancer, Age at diagnosis: Age Unknown

## 2023-04-06 NOTE — H&P PST ADULT - ASSESSMENT
DASI score:5  DASI activity: Performs all ADL's Independently. Can tolerate ambulating short distances 2/2 discomfort from ascites.   Loose teeth or denture: removable top plate

## 2023-04-06 NOTE — H&P PST ADULT - NSICDXPASTMEDICALHX_GEN_ALL_CORE_FT
PAST MEDICAL HISTORY:  DM (diabetes mellitus)     HLD (hyperlipidemia)     Hypertension     Nonischemic cardiomyopathy s/p biventricular AICD March 2014

## 2023-04-06 NOTE — H&P PST ADULT - PROBLEM SELECTOR PLAN 1
PST for scheduled Mapping and Shunt Study on 4/13/23 by Dr. Baron .    Pre-operative instruction given to patient  cbc,bmp,HgbA1C, drawn in PST PST for scheduled Mapping and Shunt Study on 4/13/23 by Dr. Baron .    Pre-operative instruction given to patient  cbc,bmp,HgbA1C, drawn in PST    Pt w/ AICD. New battery placed in 12/2022- Needs interrogation report.

## 2023-04-11 PROBLEM — R18.8 OTHER ASCITES: Chronic | Status: ACTIVE | Noted: 2023-04-06

## 2023-04-11 PROBLEM — C22.0 LIVER CELL CARCINOMA: Chronic | Status: ACTIVE | Noted: 2023-04-06

## 2023-04-13 ENCOUNTER — APPOINTMENT (OUTPATIENT)
Dept: NUCLEAR MEDICINE | Facility: HOSPITAL | Age: 68
End: 2023-04-13

## 2023-04-13 ENCOUNTER — OUTPATIENT (OUTPATIENT)
Dept: OUTPATIENT SERVICES | Facility: HOSPITAL | Age: 68
LOS: 1 days | End: 2023-04-13

## 2023-04-13 ENCOUNTER — INPATIENT (INPATIENT)
Facility: HOSPITAL | Age: 68
LOS: 5 days | Discharge: HOME | DRG: 432 | End: 2023-04-19
Attending: STUDENT IN AN ORGANIZED HEALTH CARE EDUCATION/TRAINING PROGRAM | Admitting: INTERNAL MEDICINE
Payer: MEDICARE

## 2023-04-13 VITALS
HEART RATE: 60 BPM | SYSTOLIC BLOOD PRESSURE: 73 MMHG | OXYGEN SATURATION: 99 % | WEIGHT: 169.98 LBS | HEIGHT: 72 IN | DIASTOLIC BLOOD PRESSURE: 47 MMHG

## 2023-04-13 DIAGNOSIS — Z98.890 OTHER SPECIFIED POSTPROCEDURAL STATES: Chronic | ICD-10-CM

## 2023-04-13 DIAGNOSIS — N17.9 ACUTE KIDNEY FAILURE, UNSPECIFIED: ICD-10-CM

## 2023-04-13 DIAGNOSIS — Z95.810 PRESENCE OF AUTOMATIC (IMPLANTABLE) CARDIAC DEFIBRILLATOR: Chronic | ICD-10-CM

## 2023-04-13 DIAGNOSIS — E87.1 HYPO-OSMOLALITY AND HYPONATREMIA: ICD-10-CM

## 2023-04-13 DIAGNOSIS — C22.0 LIVER CELL CARCINOMA: ICD-10-CM

## 2023-04-13 DIAGNOSIS — E87.20 ACIDOSIS, UNSPECIFIED: ICD-10-CM

## 2023-04-13 DIAGNOSIS — K74.60 UNSPECIFIED CIRRHOSIS OF LIVER: ICD-10-CM

## 2023-04-13 DIAGNOSIS — E87.5 HYPERKALEMIA: ICD-10-CM

## 2023-04-13 LAB
ALBUMIN FLD-MCNC: 0.4 G/DL — SIGNIFICANT CHANGE UP
ALBUMIN SERPL ELPH-MCNC: 2.5 G/DL — LOW (ref 3.3–5)
ALBUMIN SERPL ELPH-MCNC: 2.5 G/DL — LOW (ref 3.3–5)
ALBUMIN SERPL ELPH-MCNC: 2.8 G/DL — LOW (ref 3.3–5)
ALP SERPL-CCNC: 105 U/L — SIGNIFICANT CHANGE UP (ref 40–120)
ALP SERPL-CCNC: 82 U/L — SIGNIFICANT CHANGE UP (ref 40–120)
ALP SERPL-CCNC: 93 U/L — SIGNIFICANT CHANGE UP (ref 40–120)
ALT FLD-CCNC: 73 U/L — HIGH (ref 10–45)
ALT FLD-CCNC: 76 U/L — HIGH (ref 10–45)
ALT FLD-CCNC: 76 U/L — HIGH (ref 10–45)
ANION GAP SERPL CALC-SCNC: 10 MMOL/L — SIGNIFICANT CHANGE UP (ref 5–17)
ANION GAP SERPL CALC-SCNC: 12 MMOL/L — SIGNIFICANT CHANGE UP (ref 5–17)
ANION GAP SERPL CALC-SCNC: 13 MMOL/L — SIGNIFICANT CHANGE UP (ref 5–17)
ANION GAP SERPL CALC-SCNC: 13 MMOL/L — SIGNIFICANT CHANGE UP (ref 5–17)
ANISOCYTOSIS BLD QL: SLIGHT — SIGNIFICANT CHANGE UP
APPEARANCE UR: ABNORMAL
APTT BLD: 29.3 SEC — SIGNIFICANT CHANGE UP (ref 27.5–35.5)
AST SERPL-CCNC: 111 U/L — HIGH (ref 10–40)
AST SERPL-CCNC: 69 U/L — HIGH (ref 10–40)
AST SERPL-CCNC: 72 U/L — HIGH (ref 10–40)
B PERT IGG+IGM PNL SER: ABNORMAL
BACTERIA # UR AUTO: NEGATIVE — SIGNIFICANT CHANGE UP
BASE EXCESS BLDV CALC-SCNC: -4.5 MMOL/L — LOW (ref -2–3)
BASOPHILS # BLD AUTO: 0.04 K/UL — SIGNIFICANT CHANGE UP (ref 0–0.2)
BASOPHILS NFR BLD AUTO: 0.9 % — SIGNIFICANT CHANGE UP (ref 0–2)
BILIRUB SERPL-MCNC: 0.9 MG/DL — SIGNIFICANT CHANGE UP (ref 0.2–1.2)
BILIRUB SERPL-MCNC: 1 MG/DL — SIGNIFICANT CHANGE UP (ref 0.2–1.2)
BILIRUB SERPL-MCNC: 1 MG/DL — SIGNIFICANT CHANGE UP (ref 0.2–1.2)
BILIRUB UR-MCNC: ABNORMAL
BLD GP AB SCN SERPL QL: NEGATIVE — SIGNIFICANT CHANGE UP
BUN SERPL-MCNC: 81 MG/DL — HIGH (ref 7–23)
BUN SERPL-MCNC: 81 MG/DL — HIGH (ref 7–23)
BUN SERPL-MCNC: 82 MG/DL — HIGH (ref 7–23)
BUN SERPL-MCNC: 86 MG/DL — HIGH (ref 7–23)
BURR CELLS BLD QL SMEAR: PRESENT — SIGNIFICANT CHANGE UP
BURR CELLS BLD QL SMEAR: SIGNIFICANT CHANGE UP
CA-I SERPL-SCNC: 1.14 MMOL/L — LOW (ref 1.15–1.33)
CALCIUM SERPL-MCNC: 8.2 MG/DL — LOW (ref 8.4–10.5)
CALCIUM SERPL-MCNC: 8.3 MG/DL — LOW (ref 8.4–10.5)
CALCIUM SERPL-MCNC: 8.4 MG/DL — SIGNIFICANT CHANGE UP (ref 8.4–10.5)
CALCIUM SERPL-MCNC: 9.1 MG/DL — SIGNIFICANT CHANGE UP (ref 8.4–10.5)
CHLORIDE BLDV-SCNC: 90 MMOL/L — LOW (ref 96–108)
CHLORIDE SERPL-SCNC: 88 MMOL/L — LOW (ref 96–108)
CHLORIDE SERPL-SCNC: 88 MMOL/L — LOW (ref 96–108)
CHLORIDE SERPL-SCNC: 89 MMOL/L — LOW (ref 96–108)
CHLORIDE SERPL-SCNC: 89 MMOL/L — LOW (ref 96–108)
CO2 BLDV-SCNC: 22 MMOL/L — SIGNIFICANT CHANGE UP (ref 22–26)
CO2 SERPL-SCNC: 16 MMOL/L — LOW (ref 22–31)
CO2 SERPL-SCNC: 17 MMOL/L — LOW (ref 22–31)
CO2 SERPL-SCNC: 19 MMOL/L — LOW (ref 22–31)
CO2 SERPL-SCNC: 21 MMOL/L — LOW (ref 22–31)
COLOR FLD: YELLOW — SIGNIFICANT CHANGE UP
COLOR SPEC: YELLOW — SIGNIFICANT CHANGE UP
CREAT ?TM UR-MCNC: 265 MG/DL — SIGNIFICANT CHANGE UP
CREAT SERPL-MCNC: 2.93 MG/DL — HIGH (ref 0.5–1.3)
CREAT SERPL-MCNC: 3.06 MG/DL — HIGH (ref 0.5–1.3)
CREAT SERPL-MCNC: 3.12 MG/DL — HIGH (ref 0.5–1.3)
CREAT SERPL-MCNC: 3.2 MG/DL — HIGH (ref 0.5–1.3)
DIFF PNL FLD: NEGATIVE — SIGNIFICANT CHANGE UP
EGFR: 20 ML/MIN/1.73M2 — LOW
EGFR: 21 ML/MIN/1.73M2 — LOW
EGFR: 22 ML/MIN/1.73M2 — LOW
EGFR: 23 ML/MIN/1.73M2 — LOW
ELLIPTOCYTES BLD QL SMEAR: SLIGHT — SIGNIFICANT CHANGE UP
EOSINOPHIL # BLD AUTO: 0 K/UL — SIGNIFICANT CHANGE UP (ref 0–0.5)
EOSINOPHIL NFR BLD AUTO: 0 % — SIGNIFICANT CHANGE UP (ref 0–6)
EPI CELLS # UR: 2 /HPF — SIGNIFICANT CHANGE UP
FLUAV AG NPH QL: SIGNIFICANT CHANGE UP
FLUBV AG NPH QL: SIGNIFICANT CHANGE UP
FLUID INTAKE SUBSTANCE CLASS: SIGNIFICANT CHANGE UP
GAS PNL BLDV: 116 MMOL/L — CRITICAL LOW (ref 136–145)
GAS PNL BLDV: SIGNIFICANT CHANGE UP
GLUCOSE BLDC GLUCOMTR-MCNC: 150 MG/DL — HIGH (ref 70–99)
GLUCOSE BLDC GLUCOMTR-MCNC: 173 MG/DL — HIGH (ref 70–99)
GLUCOSE BLDC GLUCOMTR-MCNC: 251 MG/DL — HIGH (ref 70–99)
GLUCOSE BLDC GLUCOMTR-MCNC: 326 MG/DL — HIGH (ref 70–99)
GLUCOSE BLDV-MCNC: 133 MG/DL — HIGH (ref 70–99)
GLUCOSE FLD-MCNC: 150 MG/DL — SIGNIFICANT CHANGE UP
GLUCOSE SERPL-MCNC: 141 MG/DL — HIGH (ref 70–99)
GLUCOSE SERPL-MCNC: 144 MG/DL — HIGH (ref 70–99)
GLUCOSE SERPL-MCNC: 166 MG/DL — HIGH (ref 70–99)
GLUCOSE SERPL-MCNC: 238 MG/DL — HIGH (ref 70–99)
GLUCOSE UR QL: NEGATIVE — SIGNIFICANT CHANGE UP
GRAM STN FLD: SIGNIFICANT CHANGE UP
HCO3 BLDV-SCNC: 21 MMOL/L — LOW (ref 22–29)
HCT VFR BLD CALC: 29.3 % — LOW (ref 39–50)
HCT VFR BLDA CALC: 31 % — LOW (ref 39–51)
HGB BLD CALC-MCNC: 10.4 G/DL — LOW (ref 12.6–17.4)
HGB BLD-MCNC: 10.2 G/DL — LOW (ref 13–17)
HYALINE CASTS # UR AUTO: 15 /LPF — HIGH (ref 0–2)
INR BLD: 1.14 RATIO — SIGNIFICANT CHANGE UP (ref 0.88–1.16)
KETONES UR-MCNC: SIGNIFICANT CHANGE UP
LACTATE BLDV-MCNC: 1.3 MMOL/L — SIGNIFICANT CHANGE UP (ref 0.5–2)
LACTATE BLDV-MCNC: 1.5 MMOL/L — SIGNIFICANT CHANGE UP (ref 0.5–2)
LACTATE BLDV-MCNC: 1.9 MMOL/L — SIGNIFICANT CHANGE UP (ref 0.5–2)
LDH SERPL L TO P-CCNC: 51 U/L — SIGNIFICANT CHANGE UP
LEUKOCYTE ESTERASE UR-ACNC: ABNORMAL
LYMPHOCYTES # BLD AUTO: 0.38 K/UL — LOW (ref 1–3.3)
LYMPHOCYTES # BLD AUTO: 7.9 % — LOW (ref 13–44)
LYMPHOCYTES # FLD: 52 % — SIGNIFICANT CHANGE UP
MACROCYTES BLD QL: SLIGHT — SIGNIFICANT CHANGE UP
MANUAL SMEAR VERIFICATION: SIGNIFICANT CHANGE UP
MCHC RBC-ENTMCNC: 34.2 PG — HIGH (ref 27–34)
MCHC RBC-ENTMCNC: 34.8 GM/DL — SIGNIFICANT CHANGE UP (ref 32–36)
MCV RBC AUTO: 98.3 FL — SIGNIFICANT CHANGE UP (ref 80–100)
MESOTHL CELL # FLD: 1 % — SIGNIFICANT CHANGE UP
MONOCYTES # BLD AUTO: 0.5 K/UL — SIGNIFICANT CHANGE UP (ref 0–0.9)
MONOCYTES NFR BLD AUTO: 10.5 % — SIGNIFICANT CHANGE UP (ref 2–14)
MONOS+MACROS # FLD: 43 % — SIGNIFICANT CHANGE UP
NEUTROPHILS # BLD AUTO: 3.87 K/UL — SIGNIFICANT CHANGE UP (ref 1.8–7.4)
NEUTROPHILS NFR BLD AUTO: 80.7 % — HIGH (ref 43–77)
NEUTROPHILS-BODY FLUID: 4 % — SIGNIFICANT CHANGE UP
NITRITE UR-MCNC: NEGATIVE — SIGNIFICANT CHANGE UP
NT-PROBNP SERPL-SCNC: 1323 PG/ML — HIGH (ref 0–300)
OSMOLALITY UR: 333 MOS/KG — SIGNIFICANT CHANGE UP (ref 300–900)
PCO2 BLDV: 40 MMHG — LOW (ref 42–55)
PH BLDV: 7.33 — SIGNIFICANT CHANGE UP (ref 7.32–7.43)
PH UR: 5.5 — SIGNIFICANT CHANGE UP (ref 5–8)
PLAT MORPH BLD: NORMAL — SIGNIFICANT CHANGE UP
PLATELET # BLD AUTO: 141 K/UL — LOW (ref 150–400)
PO2 BLDV: 21 MMHG — LOW (ref 25–45)
POIKILOCYTOSIS BLD QL AUTO: SIGNIFICANT CHANGE UP
POLYCHROMASIA BLD QL SMEAR: SLIGHT — SIGNIFICANT CHANGE UP
POTASSIUM BLDV-SCNC: 6.2 MMOL/L — CRITICAL HIGH (ref 3.5–5.1)
POTASSIUM SERPL-MCNC: 4.8 MMOL/L — SIGNIFICANT CHANGE UP (ref 3.5–5.3)
POTASSIUM SERPL-MCNC: 5.3 MMOL/L — SIGNIFICANT CHANGE UP (ref 3.5–5.3)
POTASSIUM SERPL-MCNC: 5.5 MMOL/L — HIGH (ref 3.5–5.3)
POTASSIUM SERPL-MCNC: 6.7 MMOL/L — CRITICAL HIGH (ref 3.5–5.3)
POTASSIUM SERPL-SCNC: 4.8 MMOL/L — SIGNIFICANT CHANGE UP (ref 3.5–5.3)
POTASSIUM SERPL-SCNC: 5.3 MMOL/L — SIGNIFICANT CHANGE UP (ref 3.5–5.3)
POTASSIUM SERPL-SCNC: 5.5 MMOL/L — HIGH (ref 3.5–5.3)
POTASSIUM SERPL-SCNC: 6.7 MMOL/L — CRITICAL HIGH (ref 3.5–5.3)
POTASSIUM UR-SCNC: 52 MMOL/L — SIGNIFICANT CHANGE UP
PROCALCITONIN SERPL-MCNC: 0.9 NG/ML — HIGH (ref 0.02–0.1)
PROT ?TM UR-MCNC: 50 MG/DL — HIGH (ref 0–12)
PROT FLD-MCNC: 0.8 G/DL — SIGNIFICANT CHANGE UP
PROT SERPL-MCNC: 5.4 G/DL — LOW (ref 6–8.3)
PROT SERPL-MCNC: 5.6 G/DL — LOW (ref 6–8.3)
PROT SERPL-MCNC: 5.9 G/DL — LOW (ref 6–8.3)
PROT UR-MCNC: ABNORMAL
PROT/CREAT UR-RTO: 0.2 RATIO — SIGNIFICANT CHANGE UP (ref 0–0.2)
PROTHROM AB SERPL-ACNC: 13.3 SEC — SIGNIFICANT CHANGE UP (ref 10.5–13.4)
RBC # BLD: 2.98 M/UL — LOW (ref 4.2–5.8)
RBC # FLD: 13.2 % — SIGNIFICANT CHANGE UP (ref 10.3–14.5)
RBC BLD AUTO: ABNORMAL
RBC CASTS # UR COMP ASSIST: 3 /HPF — SIGNIFICANT CHANGE UP (ref 0–4)
RCV VOL RI: < 2000 /UL — SIGNIFICANT CHANGE UP (ref 0–0)
RH IG SCN BLD-IMP: POSITIVE — SIGNIFICANT CHANGE UP
RSV RNA NPH QL NAA+NON-PROBE: SIGNIFICANT CHANGE UP
SAO2 % BLDV: 28.9 % — LOW (ref 67–88)
SARS-COV-2 RNA SPEC QL NAA+PROBE: SIGNIFICANT CHANGE UP
SODIUM SERPL-SCNC: 117 MMOL/L — CRITICAL LOW (ref 135–145)
SODIUM SERPL-SCNC: 118 MMOL/L — CRITICAL LOW (ref 135–145)
SODIUM SERPL-SCNC: 119 MMOL/L — CRITICAL LOW (ref 135–145)
SODIUM SERPL-SCNC: 121 MMOL/L — LOW (ref 135–145)
SODIUM UR-SCNC: 7 MMOL/L — SIGNIFICANT CHANGE UP
SP GR SPEC: 1.02 — SIGNIFICANT CHANGE UP (ref 1.01–1.02)
SPECIMEN SOURCE: SIGNIFICANT CHANGE UP
TOTAL NUCLEATED CELL COUNT, BODY FLUID: 65 /UL — SIGNIFICANT CHANGE UP
TUBE TYPE: SIGNIFICANT CHANGE UP
UROBILINOGEN FLD QL: ABNORMAL
UUN UR-MCNC: 304 MG/DL — SIGNIFICANT CHANGE UP
WBC # BLD: 4.8 K/UL — SIGNIFICANT CHANGE UP (ref 3.8–10.5)
WBC # FLD AUTO: 4.8 K/UL — SIGNIFICANT CHANGE UP (ref 3.8–10.5)
WBC UR QL: 5 /HPF — SIGNIFICANT CHANGE UP (ref 0–5)

## 2023-04-13 PROCEDURE — 71045 X-RAY EXAM CHEST 1 VIEW: CPT | Mod: 26

## 2023-04-13 PROCEDURE — 99291 CRITICAL CARE FIRST HOUR: CPT | Mod: 25

## 2023-04-13 PROCEDURE — 76705 ECHO EXAM OF ABDOMEN: CPT | Mod: 26,GC

## 2023-04-13 PROCEDURE — 99223 1ST HOSP IP/OBS HIGH 75: CPT

## 2023-04-13 PROCEDURE — 76604 US EXAM CHEST: CPT | Mod: 26,GC

## 2023-04-13 PROCEDURE — 12345: CPT | Mod: NC,GC

## 2023-04-13 PROCEDURE — 93284 PRGRMG EVAL IMPLANTABLE DFB: CPT | Mod: 26

## 2023-04-13 RX ORDER — DEXTROSE 50 % IN WATER 50 %
25 SYRINGE (ML) INTRAVENOUS ONCE
Refills: 0 | Status: DISCONTINUED | OUTPATIENT
Start: 2023-04-13 | End: 2023-04-19

## 2023-04-13 RX ORDER — AMIODARONE HYDROCHLORIDE 400 MG/1
400 TABLET ORAL DAILY
Refills: 0 | Status: DISCONTINUED | OUTPATIENT
Start: 2023-04-13 | End: 2023-04-15

## 2023-04-13 RX ORDER — CHLORHEXIDINE GLUCONATE 213 G/1000ML
1 SOLUTION TOPICAL
Refills: 0 | Status: DISCONTINUED | OUTPATIENT
Start: 2023-04-13 | End: 2023-04-19

## 2023-04-13 RX ORDER — PIPERACILLIN AND TAZOBACTAM 4; .5 G/20ML; G/20ML
3.38 INJECTION, POWDER, LYOPHILIZED, FOR SOLUTION INTRAVENOUS ONCE
Refills: 0 | Status: DISCONTINUED | OUTPATIENT
Start: 2023-04-14 | End: 2023-04-14

## 2023-04-13 RX ORDER — PIPERACILLIN AND TAZOBACTAM 4; .5 G/20ML; G/20ML
3.38 INJECTION, POWDER, LYOPHILIZED, FOR SOLUTION INTRAVENOUS ONCE
Refills: 0 | Status: COMPLETED | OUTPATIENT
Start: 2023-04-13 | End: 2023-04-13

## 2023-04-13 RX ORDER — INSULIN LISPRO 100/ML
VIAL (ML) SUBCUTANEOUS AT BEDTIME
Refills: 0 | Status: DISCONTINUED | OUTPATIENT
Start: 2023-04-13 | End: 2023-04-13

## 2023-04-13 RX ORDER — CEFTRIAXONE 500 MG/1
2000 INJECTION, POWDER, FOR SOLUTION INTRAMUSCULAR; INTRAVENOUS ONCE
Refills: 0 | Status: COMPLETED | OUTPATIENT
Start: 2023-04-13 | End: 2023-04-13

## 2023-04-13 RX ORDER — PIPERACILLIN AND TAZOBACTAM 4; .5 G/20ML; G/20ML
3.38 INJECTION, POWDER, LYOPHILIZED, FOR SOLUTION INTRAVENOUS ONCE
Refills: 0 | Status: COMPLETED | OUTPATIENT
Start: 2023-04-14 | End: 2023-04-14

## 2023-04-13 RX ORDER — ALBUMIN HUMAN 25 %
250 VIAL (ML) INTRAVENOUS ONCE
Refills: 0 | Status: DISCONTINUED | OUTPATIENT
Start: 2023-04-13 | End: 2023-04-13

## 2023-04-13 RX ORDER — SODIUM CHLORIDE 9 MG/ML
500 INJECTION INTRAMUSCULAR; INTRAVENOUS; SUBCUTANEOUS ONCE
Refills: 0 | Status: COMPLETED | OUTPATIENT
Start: 2023-04-13 | End: 2023-04-13

## 2023-04-13 RX ORDER — PREGABALIN 225 MG/1
1 CAPSULE ORAL
Refills: 0 | DISCHARGE

## 2023-04-13 RX ORDER — NOREPINEPHRINE BITARTRATE/D5W 8 MG/250ML
0.14 PLASTIC BAG, INJECTION (ML) INTRAVENOUS
Qty: 8 | Refills: 0 | Status: DISCONTINUED | OUTPATIENT
Start: 2023-04-13 | End: 2023-04-15

## 2023-04-13 RX ORDER — SODIUM ZIRCONIUM CYCLOSILICATE 10 G/10G
5 POWDER, FOR SUSPENSION ORAL ONCE
Refills: 0 | Status: COMPLETED | OUTPATIENT
Start: 2023-04-13 | End: 2023-04-13

## 2023-04-13 RX ORDER — ALBUMIN HUMAN 25 %
250 VIAL (ML) INTRAVENOUS ONCE
Refills: 0 | Status: COMPLETED | OUTPATIENT
Start: 2023-04-13 | End: 2023-04-13

## 2023-04-13 RX ORDER — PIPERACILLIN AND TAZOBACTAM 4; .5 G/20ML; G/20ML
3.38 INJECTION, POWDER, LYOPHILIZED, FOR SOLUTION INTRAVENOUS EVERY 8 HOURS
Refills: 0 | Status: DISCONTINUED | OUTPATIENT
Start: 2023-04-14 | End: 2023-04-15

## 2023-04-13 RX ORDER — DEXTROSE 50 % IN WATER 50 %
15 SYRINGE (ML) INTRAVENOUS ONCE
Refills: 0 | Status: DISCONTINUED | OUTPATIENT
Start: 2023-04-13 | End: 2023-04-19

## 2023-04-13 RX ORDER — CHOLECALCIFEROL (VITAMIN D3) 125 MCG
1 CAPSULE ORAL
Refills: 0 | DISCHARGE

## 2023-04-13 RX ORDER — INSULIN LISPRO 100/ML
VIAL (ML) SUBCUTANEOUS
Refills: 0 | Status: DISCONTINUED | OUTPATIENT
Start: 2023-04-13 | End: 2023-04-13

## 2023-04-13 RX ORDER — SODIUM CHLORIDE 9 MG/ML
1000 INJECTION, SOLUTION INTRAVENOUS
Refills: 0 | Status: DISCONTINUED | OUTPATIENT
Start: 2023-04-13 | End: 2023-04-19

## 2023-04-13 RX ORDER — ALBUMIN HUMAN 25 %
100 VIAL (ML) INTRAVENOUS EVERY 6 HOURS
Refills: 0 | Status: DISCONTINUED | OUTPATIENT
Start: 2023-04-13 | End: 2023-04-13

## 2023-04-13 RX ORDER — SODIUM BICARBONATE 1 MEQ/ML
1300 SYRINGE (ML) INTRAVENOUS THREE TIMES A DAY
Refills: 0 | Status: DISCONTINUED | OUTPATIENT
Start: 2023-04-13 | End: 2023-04-18

## 2023-04-13 RX ORDER — SODIUM CHLORIDE 9 MG/ML
1000 INJECTION INTRAMUSCULAR; INTRAVENOUS; SUBCUTANEOUS
Refills: 0 | Status: DISCONTINUED | OUTPATIENT
Start: 2023-04-13 | End: 2023-04-27

## 2023-04-13 RX ORDER — CALCIUM GLUCONATE 100 MG/ML
2 VIAL (ML) INTRAVENOUS ONCE
Refills: 0 | Status: COMPLETED | OUTPATIENT
Start: 2023-04-13 | End: 2023-04-13

## 2023-04-13 RX ORDER — DEXTROSE 50 % IN WATER 50 %
12.5 SYRINGE (ML) INTRAVENOUS ONCE
Refills: 0 | Status: DISCONTINUED | OUTPATIENT
Start: 2023-04-13 | End: 2023-04-19

## 2023-04-13 RX ORDER — OCTREOTIDE ACETATE 200 UG/ML
50 INJECTION, SOLUTION INTRAVENOUS; SUBCUTANEOUS
Qty: 500 | Refills: 0 | Status: DISCONTINUED | OUTPATIENT
Start: 2023-04-13 | End: 2023-04-15

## 2023-04-13 RX ORDER — HEPARIN SODIUM 5000 [USP'U]/ML
5000 INJECTION INTRAVENOUS; SUBCUTANEOUS EVERY 8 HOURS
Refills: 0 | Status: DISCONTINUED | OUTPATIENT
Start: 2023-04-13 | End: 2023-04-18

## 2023-04-13 RX ORDER — SODIUM CHLORIDE 9 MG/ML
500 INJECTION INTRAMUSCULAR; INTRAVENOUS; SUBCUTANEOUS ONCE
Refills: 0 | Status: DISCONTINUED | OUTPATIENT
Start: 2023-04-13 | End: 2023-04-27

## 2023-04-13 RX ORDER — INSULIN LISPRO 100/ML
VIAL (ML) SUBCUTANEOUS EVERY 6 HOURS
Refills: 0 | Status: DISCONTINUED | OUTPATIENT
Start: 2023-04-13 | End: 2023-04-14

## 2023-04-13 RX ORDER — FENTANYL CITRATE 50 UG/ML
50 INJECTION INTRAVENOUS ONCE
Refills: 0 | Status: DISCONTINUED | OUTPATIENT
Start: 2023-04-13 | End: 2023-04-13

## 2023-04-13 RX ORDER — GLUCAGON INJECTION, SOLUTION 0.5 MG/.1ML
1 INJECTION, SOLUTION SUBCUTANEOUS ONCE
Refills: 0 | Status: DISCONTINUED | OUTPATIENT
Start: 2023-04-13 | End: 2023-04-19

## 2023-04-13 RX ORDER — NOREPINEPHRINE BITARTRATE/D5W 8 MG/250ML
0.05 PLASTIC BAG, INJECTION (ML) INTRAVENOUS
Qty: 8 | Refills: 0 | Status: DISCONTINUED | OUTPATIENT
Start: 2023-04-13 | End: 2023-04-13

## 2023-04-13 RX ORDER — ALBUMIN HUMAN 25 %
50 VIAL (ML) INTRAVENOUS ONCE
Refills: 0 | Status: DISCONTINUED | OUTPATIENT
Start: 2023-04-13 | End: 2023-04-13

## 2023-04-13 RX ADMIN — FENTANYL CITRATE 50 MICROGRAM(S): 50 INJECTION INTRAVENOUS at 10:41

## 2023-04-13 RX ADMIN — Medication 200 GRAM(S): at 14:28

## 2023-04-13 RX ADMIN — Medication 7.23 MICROGRAM(S)/KG/MIN: at 17:23

## 2023-04-13 RX ADMIN — Medication 1300 MILLIGRAM(S): at 21:23

## 2023-04-13 RX ADMIN — HEPARIN SODIUM 5000 UNIT(S): 5000 INJECTION INTRAVENOUS; SUBCUTANEOUS at 14:35

## 2023-04-13 RX ADMIN — Medication 125 MILLILITER(S): at 16:35

## 2023-04-13 RX ADMIN — PIPERACILLIN AND TAZOBACTAM 25 GRAM(S): 4; .5 INJECTION, POWDER, LYOPHILIZED, FOR SOLUTION INTRAVENOUS at 21:24

## 2023-04-13 RX ADMIN — SODIUM CHLORIDE 500 MILLILITER(S): 9 INJECTION INTRAMUSCULAR; INTRAVENOUS; SUBCUTANEOUS at 09:19

## 2023-04-13 RX ADMIN — Medication 3: at 17:39

## 2023-04-13 RX ADMIN — HEPARIN SODIUM 5000 UNIT(S): 5000 INJECTION INTRAVENOUS; SUBCUTANEOUS at 21:23

## 2023-04-13 RX ADMIN — Medication 8: at 21:30

## 2023-04-13 RX ADMIN — OCTREOTIDE ACETATE 10 MICROGRAM(S)/HR: 200 INJECTION, SOLUTION INTRAVENOUS; SUBCUTANEOUS at 17:23

## 2023-04-13 RX ADMIN — FENTANYL CITRATE 50 MICROGRAM(S): 50 INJECTION INTRAVENOUS at 11:11

## 2023-04-13 RX ADMIN — Medication 7.23 MICROGRAM(S)/KG/MIN: at 10:14

## 2023-04-13 RX ADMIN — CEFTRIAXONE 100 MILLIGRAM(S): 500 INJECTION, POWDER, FOR SOLUTION INTRAMUSCULAR; INTRAVENOUS at 11:06

## 2023-04-13 RX ADMIN — SODIUM ZIRCONIUM CYCLOSILICATE 5 GRAM(S): 10 POWDER, FOR SUSPENSION ORAL at 14:35

## 2023-04-13 RX ADMIN — PIPERACILLIN AND TAZOBACTAM 200 GRAM(S): 4; .5 INJECTION, POWDER, LYOPHILIZED, FOR SOLUTION INTRAVENOUS at 17:39

## 2023-04-13 NOTE — CONSULT NOTE ADULT - ATTENDING COMMENTS
sent from IR with hypotension 70s  ?syncopal in MICU BP drop 60s  decompensated liver cirrhosis  #AUSTIN - last cr april 6th 2023 1.39  beckett placed, monitor UO  start albumin  monitor lytes and volume status  no urgent indication for dialysis at this time but pt is agreeable if needed  #hypotension-on levo, octreotide, albumin; pancx; abx  #met acidosis- on bicarb tabs 1300mg po tid  monitor bicarb trend  #hyperkalemia -lokelma as needed; diuretic to facilitate k excretion if needed  #hyponatremia-hypervolemic- monitor closely  call if situation changes  d/w micu sent from IR with hypotension 70s  ?syncopal in MICU BP drop 60s  decompensated liver cirrhosis  #AUSTIN - last cr april 6th 2023 1.39  HRS vs ATN  beckett placed, monitor UO  start albumin  monitor lytes and volume status  check U NA  no urgent indication for dialysis at this time but pt is agreeable if needed  #hypotension-on levo, octreotide, albumin; pancx; abx  #met acidosis- on bicarb tabs 1300mg po tid  monitor bicarb trend  #hyperkalemia -lokelma as needed; diuretic to facilitate k excretion if needed  #hyponatremia-hypervolemic- monitor closely  call if situation changes  d/w micu

## 2023-04-13 NOTE — CONSULT NOTE ADULT - ASSESSMENT
67 year old male w.  HTN, HLD, [employer] AICD implant in 2014 with new generator placement 12/2022, T2DM, ETOH cirrhosis complicated by esophageal varices s/p banding in 2022, recurrent ascites requiring frequent paracentesis almost weekly (approx 6-7L) with last paracentesis on Monday with 8.3 L removed presents from IR due to hypotension and malaise. Patient reported recent admission on last Friday on [facility] after his cardiologist called him to present to the ED after his AICD fired 10 times. During his admission, he reports nothing was found wrong with him and ultimately was DC on Monday after being tapped. Did report feeling weak and dizzy during Tues/Wed but denies any fever, chills, N/V, abd pain, diarrhea, dysuria or SOB. In the ED, found to be hypotensive s/p 500 cc of IVF and started on Ceftriaxone. Hepatology called for further evaluation.     #Decompensated ETOH Cirrhosis, MELD NA 27 (driven by Sodium)  -EV: s/p banding on 2022  -Ascites: requiring weekly paracentesis, last on 4/10 with 8.3 L removed. Diagnostic tap on 4/13 negative for SBP  -Multifocal HCC+with two LIRAD 5 lesion in the Rt hepatic lobe, largest measuring 5.8 cm, pending mapping with IR for Y90 therapy  -HE: none    #AUSTIN stage 3  #Hyponatremia  #Shock, concerning for septic shock:  -tap negative for SBP, UA clean. pending further work up  #Nonischemic cardiomyopathy s/p AICD    Recommendations:  -Would broaden ab to Zosyn  -Follow up urine culture, CXR, blood culture  -Hold home BB in setting of AUSTIN  -Obtain urine Sodium and Cr  -Would start Albumin 25% 100 cc q6H  -Please obtain Liver US with doppler to rule out thrombus  -Obtain urine osmolality, fluid restriction for now pending hyponatremia work up  -Given AUSTIN, would limit paracentesis to 3-4 L only and replete with Albumin with 2:1  -Treat hyperkalemia per medical team  -strict I/Os  -daily INR and CMP    Recommendations preliminary until signed by attending.     Nito Andrews MD  Gastroenterology/Hepatology Fellow  1st option: [phone] (text or call), ONLY available from 7:00 am to 5:00 pm.   **Contact on-call GI fellow via answering service ([phone]) from 5pm-7am AND on weekends/holidays**  2nd option: Available via Microsoft Teams  3rd option: Pager: [phone]           67 year old male w.  HTN, HLD, [employer] AICD implant in 2014 with new generator placement 12/2022, T2DM, ETOH cirrhosis complicated by esophageal varices s/p banding in 2022, recurrent ascites requiring frequent paracentesis almost weekly (approx 6-7L) with last paracentesis on Monday with 8.3 L removed presents from IR due to hypotension and malaise. Patient reported recent admission on last Friday on [facility] after his cardiologist called him to present to the ED after his AICD fired 10 times. During his admission, he reports nothing was found wrong with him and ultimately was DC on Monday after being tapped. Did report feeling weak and dizzy during Tues/Wed but denies any fever, chills, N/V, abd pain, diarrhea, dysuria or SOB. In the ED, found to be hypotensive s/p 500 cc of IVF and started on Ceftriaxone. Hepatology called for further evaluation.     #Decompensated ETOH Cirrhosis, MELD NA 27 (driven by Sodium)  -EV: s/p banding on 2022  -Ascites: requiring weekly paracentesis, last on 4/10 with 8.3 L removed. Diagnostic tap on 4/13 negative for SBP  -Multifocal HCC+with two LIRAD 5 lesion in the Rt hepatic lobe, largest measuring 5.8 cm, pending mapping with IR for Y90 therapy  -HE: none    #AUSTIN stage 3  #Hyponatremia  #Shock, concerning for septic shock:  -tap negative for SBP, UA clean. pending further work up  #Nonischemic cardiomyopathy s/p AICD    Recommendations:  -Would broaden ab to Zosyn  -Follow up urine culture, CXR, blood culture  -Hold home BB in setting of AUSTIN  -Obtain urine Sodium and Cr  -Would start Albumin 25% 100 cc q6H  -Please obtain Liver US with doppler to rule out thrombus  -Obtain urine osmolality, fluid restriction for now pending hyponatremia work up  -Given AUSTIN, would limit paracentesis to 3-4 L only and replete with Albumin with 2:1  -Consider EP consult for device interrogation given reported shocks on Friday  -Obtain TTE  -Treat hyperkalemia per medical team  -strict I/Os  -daily INR and CMP    Recommendations preliminary until signed by attending.     Nito Andresw MD  Gastroenterology/Hepatology Fellow  1st option: [phone] (text or call), ONLY available from 7:00 am to 5:00 pm.   **Contact on-call GI fellow via answering service ([phone]) from 5pm-7am AND on weekends/holidays**  2nd option: Available via Microsoft Teams  3rd option: Pager: [phone]           67 year old male w.  HTN, HLD, [employer] AICD implant in 2014 with new generator placement 12/2022, T2DM, ETOH cirrhosis complicated by esophageal varices s/p banding in 2022, recurrent ascites requiring frequent paracentesis almost weekly (approx 6-7L) with last paracentesis on Monday with 8.3 L removed presents from IR due to hypotension and malaise. Patient reported recent admission on last Friday on [facility] after his cardiologist called him to present to the ED after his AICD fired 10 times. During his admission, he reports nothing was found wrong with him and ultimately was DC on Monday after being tapped. Did report feeling weak and dizzy during Tues/Wed but denies any fever, chills, N/V, abd pain, diarrhea, dysuria or SOB. In the ED, found to be hypotensive s/p 500 cc of IVF and started on Ceftriaxone. Hepatology called for further evaluation.     #Decompensated ETOH Cirrhosis, MELD NA 27 (driven by Sodium)  -EV: s/p banding on 2022  -Ascites: requiring weekly paracentesis, last on 4/10 with 8.3 L removed. Diagnostic tap on 4/13 negative for SBP  -Multifocal HCC+with two LIRAD 5 lesion in the Rt hepatic lobe, largest measuring 5.8 cm, pending mapping with IR for Y90 therapy  -HE: none    #AUSTIN stage 3  #Hyponatremia  #Shock, concerning for cardiogenic vs septic shock:  -tap negative for SBP, UA clean. pending further work up  #Nonischemic cardiomyopathy s/p AICD    Recommendations:  -Would broaden ab to Zosyn  -Follow up urine culture, CXR, blood culture  -Hold home BB in setting of AUSTIN  -Obtain urine Sodium and Cr  -Would start Albumin 25% 100 cc q6H  -Please obtain Liver US with doppler to rule out thrombus  -Obtain urine osmolality, fluid restriction for now pending hyponatremia work up  -Given AUSTIN, would limit paracentesis to 2-3 L only and replete with Albumin with 1:1  -Consider EP consult for device interrogation given reported shocks on Friday  -Obtain TTE  -Treat hyperkalemia per medical team  -strict I/Os  -daily INR and CMP    Recommendations preliminary until signed by attending.     Nito Andrews MD  Gastroenterology/Hepatology Fellow  1st option: [phone] (text or call), ONLY available from 7:00 am to 5:00 pm.   **Contact on-call GI fellow via answering service ([phone]) from 5pm-7am AND on weekends/holidays**  2nd option: Available via Microsoft Teams  3rd option: Pager: [phone]

## 2023-04-13 NOTE — ED PROVIDER NOTE - CLINICAL SUMMARY MEDICAL DECISION MAKING FREE TEXT BOX
67-year-old male history of hypertension, hyperlipidemia, AICD, diabetes, alcoholic cirrhosis with varices requiring banding in the past, weekly paracenteses presenting now for weakness/fatigue and hypotension.  Was scheduled for long-acting procedure with IR today and was found to be hypotensive and sent to the ER.  Symptoms have been present since Friday/Saturday.  states he was admitted to another hospital over the weekend "for his heart".  No fevers that he feels warm to the touch, abdomen is nontender, he has bilateral 2+ lower extremity edema and generalized anasarca.  He is chronically ill appearing but mentating well currently.  Concern for SBP given these vague nonspecific symptoms with hypotension and tactile fevers, also consider electrolytes, hypoalbuminemia/malnutrition, cardiac though he had an echo 3 months ago with a good EF.  Will start with albumin for the hypotension, likely paracentesis pending labs, was given 500 cc of fluid prior to arrival in the ER we will hold off on further fluids until have a better sense of his hemodynamics and fluid responsiveness.  Will need admission, ICU if needs pressors. 67-year-old male history of hypertension, hyperlipidemia, AICD, diabetes, alcoholic cirrhosis with varices requiring banding in the past, weekly paracenteses presenting now for weakness/fatigue and hypotension.  Was scheduled for long-acting procedure with IR today and was found to be hypotensive and sent to the ER.  Symptoms have been present since Friday/Saturday.  states he was admitted to another hospital over the weekend "for his heart".  No fevers that he feels warm to the touch, abdomen is nontender, he has bilateral 2+ lower extremity edema and generalized anasarca.  He is chronically ill appearing but mentating well currently.  Concern for SBP given these vague nonspecific symptoms with hypotension and tactile fevers, also consider electrolytes, hypoalbuminemia/malnutrition, cardiac though he had an echo 3 months ago with a good EF.  Will start with albumin for the hypotension, likely paracentesis pending labs, was given 500 cc of fluid prior to arrival in the ER we will hold off on further fluids until have a better sense of his hemodynamics and fluid responsiveness.  Will need admission, ICU if needs pressors.    Jj Ramírez MD. agree with above. pt is a cirrhotic 2/2 etoh, afebrile but now hypotensive. labs concerning for hepatorenal syndrome. although afebrile, still would need diagnostic paracentesis. will start vasopressors. will consult transplant services.

## 2023-04-13 NOTE — ED ADULT NURSE NOTE - OBJECTIVE STATEMENT
68 y/o A&Ox4 w/ PMH of hepatic cirrhosis with recurrent ascites (freq paracentesis) liver ca, HTN, HLD, AICD implant (new generator placed 2022), diabetes, esophageal varices (banding) and portal HTN presents to the ED for hypotension. Pt was brought down from IR due to hypotension. Pt was scheduled for lung mapping procedure and was originally found to have high potassium and low sodium. Pt came back this morning for testing and was found to be hypotensive- pt was immediately brought down to the ED. Pt endorses dizziness since Friday. Upon initial assessment, pt abdomen is distended and protruding. Pt states he typically gets 8.8L drained every Monday. Pt denies sob, chest pain, N/V/D, fever, chills and headache. Pt received 500mL bag of fluids from IR. Pt placed on CM and IV access obtained. Pt safety and comfort provided.

## 2023-04-13 NOTE — H&P ADULT - NSHPLABSRESULTS_GEN_ALL_CORE
10.2   4.80  )-----------( 141      ( 2023 09:53 )             29.3       04-13    117<LL>  |  88<L>  |  81<H>  ----------------------------<  166<H>  5.5<H>   |  16<L>  |  3.06<H>    Ca    8.2<L>      2023 12:31    TPro  5.4<L>  /  Alb  2.5<L>  /  TBili  0.9  /  DBili  x   /  AST  72<H>  /  ALT  73<H>  /  AlkPhos  93  04-13              Urinalysis Basic - ( 2023 11:55 )    Color: Yellow / Appearance: Slightly Turbid / S.022 / pH: x  Gluc: x / Ketone: Trace  / Bili: Small / Urobili: 2 mg/dL   Blood: x / Protein: 30 mg/dL / Nitrite: Negative   Leuk Esterase: Moderate / RBC: 3 /hpf / WBC 5 /HPF   Sq Epi: x / Non Sq Epi: x / Bacteria: Negative        PT/INR - ( 2023 09:53 )   PT: 13.3 sec;   INR: 1.14 ratio         PTT - ( 2023 09:53 )  PTT:29.3 sec    Lactate Trend            CAPILLARY BLOOD GLUCOSE      POCT Blood Glucose.: 150 mg/dL (2023 08:03)
5

## 2023-04-13 NOTE — ED ADULT NURSE REASSESSMENT NOTE - NS ED NURSE REASSESS COMMENT FT1
Pt getting paracentesis at bedside. Consent form signed and placed in the Pt's chart. Pt premedicated with fentanyl for procedure.

## 2023-04-13 NOTE — PROCEDURE NOTE - INTERROGATION NOTE: COMMENTS
Called lab about status of BNP result.   This RN placed on hold and now waiting for call back.    6.5-7.1 years remain on battery

## 2023-04-13 NOTE — ED PROVIDER NOTE - PROGRESS NOTE DETAILS
Jefe: cloudy PD fluid obtained, will empirically give CTX 2g as pt appears to be decompensated cirrhosis with hepatorenal syndrome. On pressors, fluid aliquots as tolerated. GIven Child Ervin B and MELD-NA of 27 paged transplant as pt would benefit from being on their service. Jefe: spoke again with hepatology by avery criteria pt not txplant candidate, would want CT a/p non con. Rec micu consult, spoke with them, they will see pt, he is on .12 of levo right now. stable. Jefe: accepted by [facility] Jefe: accepted by [facility].

## 2023-04-13 NOTE — H&P ADULT - NSHPREVIEWOFSYSTEMS_GEN_ALL_CORE
REVIEW OF SYSTEMS:    CONSTITUTIONAL: +generalized weakness, dizziness  EYES/ENT: No visual changes;  No vertigo or throat pain   NECK: No pain or stiffness  RESPIRATORY: No cough, wheezing, hemoptysis; No shortness of breath  CARDIOVASCULAR: No chest pain or palpitations  GASTROINTESTINAL: No abdominal or epigastric pain. No nausea, vomiting, or hematemesis; No diarrhea or constipation. No melena or hematochezia.  GENITOURINARY: No dysuria, frequency or hematuria  NEUROLOGICAL: No numbness or weakness  SKIN: No itching, burning, rashes, or lesions   HEME: no easy bruising or unexplained bleeding  ENDO: no heat intolerance, no cold intolerance  PSYCH: no SI, or depression  All other review of systems is negative unless indicated above. REVIEW OF SYSTEMS:    CONSTITUTIONAL: +generalized weakness, dizziness  EYES/ENT: No visual changes;  No vertigo or throat pain   NECK: No pain or stiffness  RESPIRATORY: No cough, wheezing, hemoptysis; No shortness of breath  CARDIOVASCULAR: No chest pain or palpitations  GASTROINTESTINAL: No abdominal or epigastric pain. No nausea, vomiting, or hematemesis; No diarrhea or constipation. No melena or hematochezia. +abd distension  GENITOURINARY: No dysuria, frequency or hematuria  NEUROLOGICAL: No numbness or weakness  SKIN: No itching, burning, rashes, or lesions   HEME: no easy bruising or unexplained bleeding  ENDO: no heat intolerance, no cold intolerance  PSYCH: no SI, or depression  All other review of systems is negative unless indicated above.

## 2023-04-13 NOTE — ED PROCEDURE NOTE - CPROC ED PARACENTESIS DETAIL1
The anatomic location was identified, and a needle with catheter/plastic sheath was introduced into the peritoneal cavity. Fluid was allowed to drain./Ultrasound guidance was used.

## 2023-04-13 NOTE — CONSULT NOTE ADULT - PROBLEM SELECTOR RECOMMENDATION 9
Pt with AUSTIN on CKD likely in setting of hemodynamics, notable hypotension currently requiring IV  vasopressors as well as HRS physiology likely also playing a part.[facility] to have a SCr of 1.39 on 4/6 and on admission today elevated to 3.2. UA without proteinuria of hematuria and urine lytes concerning for low [place] of 7 suggesting hepatorenal syndrome. pt currently on IV vasopressors. Agree with albumin infusions at this time. Collins catheter in place, monitor UOP.   Labs reviewed notable for hyperkalemia and acidosis. No acute indication for RRT at this time, however explained to patient that if renal function worsens then he may require. He understands and is in agreement; HD consent obtained and placed in the chart.   Monitor labs and urine output. Avoid nephrotoxins. Dose medications as per eGFR.

## 2023-04-13 NOTE — PATIENT PROFILE ADULT - HAVE YOU HAD COVID IN THE LAST 60 DAYS?
Catalina Minor MD [address] [facility], [address] [place] [address],[address]  [place], [address] [facility]  P: [phone] / F: [phone]    Brief Postoperative Note  Surgical Facility: [address] [place] [address]   1/31/23    Margarito Crespo  [id number]  1955    Dear Gage Jean DO,    I've enclosed a Brief Op note on a procedure performed on your patient, Margarito Crespo today. Pre-operative Diagnosis: Elevated PSA and abnormal prostate MRI  Post-operative Diagnosis: Same    Procedure: Prostate U/S and MR fusion guided biopsy of prostate      Anesthesia: MAC    Surgeon: Steven Abbott; Resident: Karen Hammonds MD     Findings: Prostate U/S volume = 85 mL     Plan: Follow up depending on results of prostate biopsy pathology. Thank you for allowing me to participate in the care of this patient. I will keep you updated on this patient's follow up and I look forward to serving you and your patients again in the future.         Electronically signed by Laverne Lehman MD, FACS
No

## 2023-04-13 NOTE — H&P ADULT - NSHPPHYSICALEXAM_GEN_ALL_CORE
VITALS:   T(C): 36.5 (04-13-23 @ 08:53), Max: 36.5 (04-13-23 @ 08:53)  HR: 60 (04-13-23 @ 12:35) (59 - 61)  BP: 99/59 (04-13-23 @ 12:35) (73/47 - 107/55)  RR: 14 (04-13-23 @ 12:35) (12 - 18)  SpO2: 100% (04-13-23 @ 12:35) (99% - 100%)    GENERAL: NAD, lying in bed comfortably + chronically ill appearing  HEAD:  Atraumatic, Normocephalic  EYES: conjunctiva and sclera clear  NECK: Supple, No JVD  CHEST/LUNG: Clear to auscultation bilaterally; No rales, rhonchi, wheezing, or rubs. Unlabored respirations  HEART: Regular rate and rhythm; No murmurs, rubs, or gallops  ABDOMEN: BSx4; Soft, nontender, +distended  EXTREMITIES:   1+ edema b/l LE  NERVOUS SYSTEM:  A&Ox3, no focal deficits , no asterixis  SKIN: No rashes or lesions  Psych: Normal speech, normal behavior, normal affect

## 2023-04-13 NOTE — ED PROVIDER NOTE - CARE PLAN
1 Principal Discharge DX:	Decompensated hepatic cirrhosis  Secondary Diagnosis:	Hepatorenal syndrome

## 2023-04-13 NOTE — CHART NOTE - NSCHARTNOTEFT_GEN_A_CORE
:  Em Bledsoe  INDICATION:  shock  PROCEDURE:  [ ] LIMITED ECHO  [x ] LIMITED CHEST  [ ] LIMITED RETROPERITONEAL  [ x] LIMITED ABDOMINAL  [ ] LIMITED DVT  [ ] NEEDLE GUIDANCE VASCULAR  [ ] NEEDLE GUIDANCE THORACENTESIS  [ ] NEEDLE GUIDANCE PARACENTESIS  [ ] NEEDLE GUIDANCE PERICARDIOCENTESIS  [ ] OTHER    FINDINGS:  CHEST: a line predominant bilaterally. No pleural effusions.  ABD. No evidence of hydronephrosis bilaterally.      INTERPRETATION:1. Normal Lungs                            2. No hydronephrosis

## 2023-04-13 NOTE — ED PROVIDER NOTE - OBJECTIVE STATEMENT
This is a 67 year old male w.  HTN, HLD,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  Last paracentesis Monday with the [facility] presenting now for hypotension and malaise.  States he has been feeling ill since Friday, no focal symptoms just feels lightheaded dizzy, denies abdominal pain, fever, shortness of breath, chest pain, new leg swelling.  States his abdomen is not as distended as when he has needed paracentesis in the past.  Today was going to IR for lung mapping procedure was found to be hypotensive and sent down to the ER.  Blood pressures were in the 70s/40s.

## 2023-04-13 NOTE — H&P ADULT - NSHPOUTPATIENTPROVIDERS_GEN_ALL_CORE
PCP: Dr Chu White  Hep: Dr. Marcial Htut  Onc: Dr. Kee PCP: Dr Chu White  Hep: Dr. Marcial Htut  Onc: Dr. Kee  PCP Dr Chu White [phone]  Cardiac Dr Mata [phone]

## 2023-04-13 NOTE — PATIENT PROFILE ADULT - FALL HARM RISK - RISK INTERVENTIONS

## 2023-04-13 NOTE — CONSULT NOTE ADULT - PROBLEM SELECTOR RECOMMENDATION 4
Pt. with metabolic acidosis in setting of AUSTIN. Continue with PO sodium bicarbonate tabs. Most recent SCO2 of 16. Monitor.     If any questions, please feel free to contact me     Damian Black  Nephrology Fellow  [facility] Pager: [phone]

## 2023-04-13 NOTE — ED ADULT NURSE REASSESSMENT NOTE - NS ED NURSE REASSESS COMMENT FT1
Pt expressed adverse reaction to albumin transfusion. Pt states his legs spams and arms contract. MD made aware and to discuss with patient.

## 2023-04-13 NOTE — CONSULT NOTE ADULT - NS ATTEND AMEND GEN_ALL_CORE FT
given all comorbities and active issue prognosis is poor  Although understand desire to avoid amiodarone given liver issues, the frequency of his recurrent VT is very concerning  I think given this we should continue the amiodarone for now

## 2023-04-13 NOTE — ED PROCEDURE NOTE - CPROC ED POST PROC CARE GUIDE1
Service to anticoagulation and PT/INR lab orders placed.   Verbal/written post procedure instructions were given to patient/caregiver./Instructed patient/caregiver regarding signs and symptoms of infection.

## 2023-04-13 NOTE — ED ADULT NURSE REASSESSMENT NOTE - NS ED NURSE REASSESS COMMENT FT1
Indwelling Collins catheter placed as per MD order; 2 RNs at bedside during insertion; sterile technique utilized and maintained. Catheter securement device placed, catheter draining to gravity, 100 mL -- urine drained. Pt tolerated well.

## 2023-04-13 NOTE — CONSULT NOTE ADULT - PROBLEM SELECTOR RECOMMENDATION 3
Pt. with hyponatremia in setting of HRS physiology and excessive free water intake. Albumin infusions should help with hyponatremia. Shanika also leading to decreased free water excretion. Pt discharged from OSH with salt tabs 1g TID; hold for now. Encourage solute intake; add protein shakes with meals. Limit free water intake. Check serum osm.  Most recent SNa of 117. Monitor and avoid overcorrection, no more than 6-8mEq over 24 hour period.

## 2023-04-13 NOTE — H&P ADULT - NSHPSOCIALHISTORY_GEN_ALL_CORE
States former heavy drinker, states last drink 1 yr ago  Former smoker briefly in 20s only for several years  Denies substance use    Lives alone, no HHA  Friend is his HCP  , no children

## 2023-04-13 NOTE — ED PROVIDER NOTE - NSICDXPASTMEDICALHX_GEN_ALL_CORE_FT
PAST MEDICAL HISTORY:  Ascites     DM (diabetes mellitus)     HLD (hyperlipidemia)     Hypertension     Liver cell carcinoma     Nonischemic cardiomyopathy s/p biventricular AICD March 2014

## 2023-04-13 NOTE — H&P ADULT - ATTENDING COMMENTS
Attending Attestation:    Patient seen and examined with resident/fellow.  Agree with above except as noted.    66 yo male with ETOH induced cirrhosis with ascites, hepatocellular carcinoma, non ischemic cardiomyopathy now admitted for hypotension.    1. Hypotension of unclear Etiology.  Pt had 9 liter paracentesis a few days ago at [facility]. Pt denies fever chills SOB. Pt  has had multiple runs of vtach, SVT and , started on amiodarone. at [facility].  Paracentesis in ED does not show infection, no SBP. UA negative. CXR WNL.  UA NA 7 indicating volume depletion vs hepatorenal syndrome.    In MICU pt had ?syncopal episode. No arrythmia. SBP 62. Pressors increase and pt woke up. Pt said he saw bright lights.    Plan:  1.Titrate pressors for [id number]  2. 250mls  5% albumin x 2 for volume depletion.  3. Follow up all cultures.  4. Continue Zosyn    2. Renal: Acute renal failure with hyperkalemia and hyponatremia. ENEDINA 7.DDX hepatorenal syndrome vs intravascular depletion. Also DDX ATN.       Plan fluid with 5% albumin 250mls x2. Then 25% albumin 50cc q 6 hrs.     Octreotide drip     Levophed for MAP>70      3. Hyponatremia and hyperkalemia.      Pt alert and O x3. Non focal neuro exam.      No need to emergently correct NA.      Albumin above  will likely increase NA      Hyperkalemia; Pt given Lokelma . Repeat K +      If hyperkalemia and renal failure  do not resolve  may need RRT.      Continue bicarbonate tabs    3.Liver. Decompensated  ETOH cirrhosis with ascites. Ascites does not appear infected.                  Pt is not encephalopathic.                Possible hepatorenal syndrome. Plan above  4. Hepatocellular carcinoma. Pt was being evaluated for possible embolization of liver lesions. On hold until hemodynamically stable.    5. Cardiac.  ETOH induced cardiomyopathy. Pt was on Coreg and Lisinopril. Hold due to hypotension.                        AICD has shown pt with multiple episodes of SVT and VTACH; Pt started on amiodarone. Appreciate EP input. Will continue amiodarone                         and follow LFTS.                        Base line HR increased from 60 to 80.  6.DVT prophylaxis: SQ heparin'  7. POCUS reveals a line predominant chest without pleural effusions.  Heart windows difficult  Unable to attain good views.  8. GOC; Full code

## 2023-04-13 NOTE — H&P ADULT - ASSESSMENT
#Neuro:  -    #Cardiovascular:  -    #Pulmonary:  -    #FEN/GI:  -    #Renal:  -    #:  -    #ID:  -    #Heme:  -    #Endo:  ##Hx T2DM    #Ethics:  -States Christian Brooklynn (Friend) [phone] is HCP  -Full code - (discussed with pt and he expressed he would want to be full code)  67 year old male w.  HTN, HLD, NICM s/p [employer]es AICD implant in 2014 with new generator placement 12/2022, T2DM, esophageal varices s/p banding, portal HTN, decompensated ETOH induced hepatic cirrhosis, with recurrent ascites requiring ~weekly paracentesis (approx 6-7L), HCC, recent adm at OSH for AICD shocks and also had para at that time, p/w several days of weakness and dizziness, found to be in shock of unclear etiology, and also ARF and hyponatremia (Na 117).    #Neuro:  -aaox3  no active issue    #Cardiovascular:  -Shock - unclear etiology  likely 2/2 cirrhosis vs (less likely) sepsis  maintain MAP >70  on levo    -NICM w/ AICD (per pt Abbott"  recent adm 1 week prior to presentation for shocks noted by cardiologist  EP called for interrogation  pt denies sensation of AICD shocks, but also did not feel it when he went for adm.    #Pulmonary:  -Saturating well on RA  -no active issue    #FEN/GI:  -#Decompensated EtOH Cirrhosis, HCC  Decompensated with weekly para, last para 4/10, unknown how much removed  Saw Dr. Loza o/p.Was planned for Y90 embolization  appreciate hep input  Liver US -doppler to r/o thrombus as req by Hep  Pending CT A/P    #Renal:  ##AUSTIN  likely HRS  IV albumin  pressors, maintain MAP aim for MAP >70       ##HypoNa, HyperK  Na 118, Cr 3.12  (1.39 on 4/6),   K 6.7 (hemolyzed) --> repeat 5.5 not hemolyzed --> will give Ca gluc and lokelma  U sodium :7  I&Os  s/p beckett 4/13 in ED    #:  -s/p beckett as above    #ID:  ##R/o sepsis  No focal sx, less likely sepsis however given hypotension will complete infx mendoza  s/p Para - fluid not c/w SBP, pt reported sx less c/w SBP  UA neg  F/u Bcx x2  F/u culture of peritoneal fluid/gram stain  RVP results[ ]  F/u CXR       #Heme:  -Anemia  Hgb 10 (hg 13 on 4/3)    -Thrombocytopenia  likely 2/2 cirrhosis  Plt 141    -DVT ppx  hep subc    #Endo:  ##Hx T2DM    #Ethics:  -States Christian Overton (Friend) [phone] is HCP  -Full code - (discussed with pt and he expressed he would want to be full code)  67 year old male w.  HTN, HLD, NICM s/p [employer]es AICD implant in 2014 with new generator placement 12/2022, T2DM, esophageal varices s/p banding, portal HTN, decompensated ETOH induced hepatic cirrhosis, with recurrent ascites requiring ~weekly paracentesis (approx 6-7L), HCC, recent adm at OSH for AICD shocks and also had para at that time, p/w several days of weakness and dizziness, found to be in shock of unclear etiology, and also ARF and hyponatremia (Na 117).    #Neuro:  -aaox3  no active issue    #Cardiovascular:  -Shock - unclear etiology  likely 2/2 cirrhosis vs (less likely) sepsis  maintain MAP >70  on levo    -NICM w/ AICD (per pt Abbott"  recent adm 1 week prior to presentation for shocks noted by cardiologist  EP called for interrogation  pt denies sensation of AICD shocks, but also did not feel it when he went for adm.    #Pulmonary:  -Saturating well on RA  -no active issue    #FEN/GI:  -#Decompensated EtOH Cirrhosis, HCC  Decompensated with weekly para, last para 4/10, unknown how much removed  Saw Dr. Loza o/p.Was planned for Y90 embolization  appreciate hep input  Liver US -doppler to r/o thrombus as req by Hep  Pending CT A/P    #Renal:  ##AUSTIN  likely HRS  IV albumin  pressors, maintain MAP aim for MAP >70       ##HypoNa, HyperK  Na 118, Cr 3.12  (1.39 on 4/6),   K 6.7 (hemolyzed) --> repeat 5.5 not hemolyzed --> will give Ca gluc and lokelma  Was DC from OSH on NaCl 1g TID  U sodium :7  I&Os  s/p beckett 4/13 in ED    #:  -s/p beckett as above    #ID:  ##R/o sepsis  No focal sx, less likely sepsis however given hypotension will complete infx mendoza  s/p Para - fluid not c/w SBP, pt reported sx less c/w SBP  UA neg  F/u Bcx x2  F/u culture of peritoneal fluid/gram stain  RVP results[ ]  F/u CXR       #Heme:  -Anemia  Hgb 10 (hg 13 on 4/3)    -Thrombocytopenia  likely 2/2 cirrhosis  Plt 141    -DVT ppx  hep subc    #Endo:  ##Hx T2DM    #Ethics:  -States Christian Overton (Friend) [phone] is HCP  -Full code - (discussed with pt and he expressed he would want to be full code)  67 year old male w.  HTN, HLD, NICM s/p [employer] AICD implant in 2014 with new generator placement 12/2022, T2DM, esophageal varices s/p banding, portal HTN, decompensated ETOH induced hepatic cirrhosis, with recurrent ascites requiring ~weekly paracentesis (approx 6-7L), HCC, recent adm at OSH for VT and also had para at that time, p/w several days of weakness and dizziness, found to be in shock of unclear etiology, and also ARF and hyponatremia (Na 117).    #Neuro:  -aaox3  no active issue    #Cardiovascular:  -Shock - unclear etiology  likely 2/2 cirrhosis vs (less likely) sepsis  maintain MAP >70  on levo    -NICM w/ AICD (per pt Abbott)  recent adm 1 week prior to presentation for shocks noted by cardiologist  EP called for interrogation  Pt states was sent to [facility] by cardiologist for "shocks"  appreciate EP interrogation here - no shocks, but did have VT    ?Hx VT  Was DC from [facility] on amiodarone load, also was on carvedilol    Hx HTN  was on lisinopril 20mg QD, prev on lasix 40mg QD (but last filled Oct)  hold    #Pulmonary:  -Saturating well on RA  -no active issue    #FEN/GI:  -#Decompensated EtOH Cirrhosis, HCC  Decompensated with weekly para, last para 4/10, unknown how much removed  Saw Dr. Loza o/p.Was planned for Y90 embolization  appreciate hep input  Liver US -doppler to r/o thrombus as req by Hep  Pending CT A/P    #Renal:  ##AUSTIN  likely HRS  IV albumin  pressors, maintain MAP aim for MAP >70       ##HypoNa, HyperK  Na 118, Cr 3.12  (1.39 on 4/6),   K 6.7 (hemolyzed) --> repeat 5.5 not hemolyzed --> will give Ca gluc and rigobertoma  Was DC from OSH on NaCl 1g TID  U sodium :7  I&Os  s/p beckett 4/13 in ED    #:  -s/p beckett as above    #ID:  ##R/o sepsis  No focal sx, less likely sepsis however given hypotension will complete infx mendoza  s/p Para - fluid not c/w SBP, pt reported sx less c/w SBP  UA neg  F/u Bcx x2  F/u culture of peritoneal fluid/gram stain  RVP results[ ]  F/u CXR       #Heme:  -Anemia  Hgb 10 (hg 13 on 4/3)    -Thrombocytopenia  likely 2/2 cirrhosis  Plt 141    -DVT ppx  hep subc    #Endo:  ##Hx T2DM    #Ethics:  -States Christian Brooklynn (Friend) [phone] is HCP  -Full code - (discussed with pt and he expressed he would want to be full code)  67 year old male w.  HTN, HLD, NICM s/p [employer] AICD implant in 2014 with new generator placement 12/2022, T2DM, esophageal varices s/p banding, portal HTN, decompensated ETOH induced hepatic cirrhosis, with recurrent ascites requiring ~weekly paracentesis (approx 6-7L), HCC, recent adm at OSH for VT and also had para at that time, p/w several days of weakness and dizziness, found to be in shock of unclear etiology, and also ARF and hyponatremia (Na 117).    #Neuro:  -aaox3  no active issue    #Cardiovascular:  -Shock - unclear etiology  likely 2/2 cirrhosis vs (less likely) sepsis  maintain MAP >70  on levo    -NICM w/ AICD (per pt Abbott)  recent adm 1 week prior to presentation for shocks noted by cardiologist  EP called for interrogation  Pt states was sent to [facility] by cardiologist for "shocks"  appreciate EP interrogation here - no shocks, but did have VT    ?Hx VT  Was DC from [facility] on amiodarone load, also was on carvedilol    Hx HTN  was on lisinopril 20mg QD, prev on lasix 40mg QD (but last filled Oct)  hold    #Pulmonary:  -Saturating well on RA  -no active issue    #FEN/GI:  -#Decompensated EtOH Cirrhosis, HCC  Decompensated with weekly para, last para 4/10, unknown how much removed  Saw Dr. Loza o/p.Was planned for Y90 embolization  appreciate hep input  Liver US -doppler to r/o thrombus as req by Hep  Pending CT A/P    #Renal:  ##AUSTIN  likely HRS  IV albumin  pressors, maintain MAP aim for MAP >70       ##HypoNa, HyperK  Na 118, Cr 3.12  (1.39 on 4/6),   K 6.7 (hemolyzed) --> repeat 5.5 not hemolyzed --> will give Ca gluc and lokelma  Was DC from OSH on NaCl 1g TID  U sodium :7  I&Os  s/p beckett 4/13 in ED  [ ] BMP q6-8    #:  -s/p beckett as above    #ID:  ##R/o sepsis  No focal sx, less likely sepsis however given hypotension will complete infx mendoza  s/p Para - fluid not c/w SBP, pt reported sx less c/w SBP  UA neg  F/u Bcx x2  F/u culture of peritoneal fluid/gram stain  RVP results[ ]  F/u CXR       #Heme:  -Anemia  Hgb 10 (hg 13 on 4/3)    -Thrombocytopenia  likely 2/2 cirrhosis  Plt 141    -DVT ppx  hep subc    #Endo:  ##Hx T2DM  hold home metformin  FSG within goal currently    #Ethics:  - Christian Brooklynn (Friend) [phone] is HCP  -Full code - (discussed with pt and he expressed he would want to be full code)

## 2023-04-13 NOTE — ED PROVIDER NOTE - ATTENDING CONTRIBUTION TO CARE
I, Jj Ramírez, performed a history and physical exam of the patient and discussed their management with the resident and/or advanced care provider. I reviewed the resident and/or advanced care provider's note and agree with the documented findings and plan of care except where noted. I was present and available for all procedures.    see mdm

## 2023-04-13 NOTE — H&P ADULT - HISTORY OF PRESENT ILLNESS
67 year old male w.  HTN, HLD, NICM s/p [employer] AICD implant in 2014 with new generator placement 12/2022, T2DM, esophageal varices s/p banding, portal HTN, decompensated ETOH induced hepatic cirrhosis, with recurrent ascites requiring ~weekly paracentesis (approx 6-7L), HCC p/w several days of weakness and dizziness, and sensation of "loss of equilibrium". Pt is somewhat poor historian. These past few days he sometimes felt like he might fall, had to grab onto furniture, but denies any falls or syncope. Pt reports was admitted at [facility] 4/7-4/10 for apparently AICD discharges and had a paracentesis on Monday. Not long after discharge started feeling unwell. Denies any f/ chills, sore throat, CP, SOB, abd pain, n/v, LE edema. Pt reports good appetite and oral intake, denies hx of hyponatremia. States took carvedilol and lisinopril this morning. Urinating well.    Pt states unsure when diagnosed with HCC but states > 1year ago. Saw hepatology Dr. Loza earlier this month for establishment of care for HCC. Pt was evaluated at [facility] earlier this month for liver directed therapy Y90 embolization and as per note from IR he was planned for "Mapping and Shunt Study on 4/13/23 by Dr. Baron ".     In the ED: VS T97.7 rectal, 73/47 in triage, HR 60s, satting well on RA. Dx para done in ED. Given IV fent push, CTX 2g, NS 500cc. Subsequently started on levo with BP now 90s-100s/50s. Labs signif for low Na 118, Cr 3.12  (1.39 on 4/6), K 6.7 (hemolyzed).      67 year old male w.  HTN, HLD, NICM s/p [employer]es AICD implant in 2014 with new generator placement 12/2022, T2DM, esophageal varices s/p banding, portal HTN, decompensated ETOH induced hepatic cirrhosis, with recurrent ascites requiring ~weekly paracentesis (approx 6-7L), HCC p/w several days of weakness and dizziness, and sensation of "loss of equilibrium". Pt is somewhat poor historian. These past few days he sometimes felt like he might fall, had to grab onto furniture, but denies any falls or syncope. Pt reports was admitted at [facility] 4/7-4/10 for apparently AICD discharges and had a paracentesis on Monday. Not long after discharge started feeling unwell. Denies any f/ chills, sore throat, CP, SOB, abd pain, n/v, LE edema. Pt reports good appetite and oral intake, denies hx of hyponatremia. States took carvedilol and lisinopril this morning. Urinating well. Writer called pharmacy and seems he was discharged from [facility] on 3 new medications amiodarone, sodium bicarb 650 2 tab TID, and NaCl 1g tab TID.    Pt states unsure when diagnosed with HCC but states > 1year ago. Saw hepatology Dr. Loza earlier this month for establishment of care for HCC. Pt was evaluated at [facility] earlier this month for liver directed therapy Y90 embolization and as per note from IR he was planned for "Mapping and Shunt Study on 4/13/23 by Dr. Baron ".     In the ED: VS T97.7 rectal, 73/47 in triage, HR 60s, satting well on RA. Dx para done in ED. Given IV fent push, CTX 2g, NS 500cc. Subsequently started on levo with BP now 90s-100s/50s. Labs signif for low Na 118, Cr 3.12  (1.39 on 4/6), K 6.7 (hemolyzed).      67 year old male w.  HTN, HLD, NICM s/p [employer] AICD implant in 2014 with new generator placement 12/2022, T2DM, esophageal varices s/p banding, portal HTN, decompensated ETOH induced hepatic cirrhosis, with recurrent ascites requiring ~weekly paracentesis (approx 6-7L), HCC p/w several days of weakness and dizziness, and sensation of "loss of equilibrium". Pt is somewhat poor historian. These past few days he sometimes felt like he might fall, had to grab onto furniture, but denies any falls or syncope. Pt reports was admitted at [facility] 4/7-4/10 for apparently "AICD shocks" and had a paracentesis on Monday. Not long after discharge started feeling unwell. Denies any f/ chills, sore throat, CP, SOB, abd pain, n/v, LE edema. Pt reports good appetite and oral intake, denies hx of hyponatremia. States took carvedilol and lisinopril this morning. Urinating well. Writer called pharmacy and seems he was discharged from [facility] on 3 new medications amiodarone, sodium bicarb 650 2 tab TID, and NaCl 1g tab TID.    Pt states unsure when diagnosed with HCC but states > 1year ago. Saw hepatology Dr. Loza earlier this month for establishment of care for HCC. Pt was evaluated at [facility] earlier this month for liver directed therapy Y90 embolization and as per note from IR he was planned for "Mapping and Shunt Study on 4/13/23 by Dr. Baron ".     In the ED: VS T97.7 rectal, 73/47 in triage, HR 60s, satting well on RA. Dx para done in ED. Given IV fent push, CTX 2g, NS 500cc. Subsequently started on levo with BP now 90s-100s/50s. Labs signif for low Na 118, Cr 3.12  (1.39 on 4/6), K 6.7 (hemolyzed).

## 2023-04-13 NOTE — ED PROVIDER NOTE - PHYSICAL EXAMINATION
Vitals: I have reviewed the patients vital signs  General: chronically ill appearing  HEENT: Atraumatic, normocephalic, airway patent  Eyes: EOMI, tracking appropriately  Neck: no tracheal deviation, no JVD  Chest/Lungs: no trauma, symmetric chest rise, speaking in complete sentences, no WOB  Heart: warm extremities, weakly palpable radials  Neuro: A+Ox3, too weak to ambulate effectively, moves all extremities without laterality  MSK: atrophic extremities  Skin: no cyanosis, no jaundice, no new emergent lesions   Abd: distended, paracentesis bruises noted, no tenderness

## 2023-04-13 NOTE — ED ADULT TRIAGE NOTE - CHIEF COMPLAINT QUOTE
sent from IR for hypotension and electrolyte imbalance  was scheduled today for maping and lung shunt

## 2023-04-13 NOTE — CONSULT NOTE ADULT - ASSESSMENT
67 year old male w.  HTN, HLD, NICM s/p [employer] Judes CRTD implant in 2014 with new generator placement 12/2022, T2DM, esophageal varices s/p banding, portal HTN, decompensated ETOH induced hepatic cirrhosis, with recurrent ascites requiring ~weekly paracentesis (approx 6-7L), HCC, recent adm at [facility] for VT and also had para at that time, discharged on Amiodarone load and sodium bicarb; p/w several days of weakness and dizziness, found to be in shock of unclear etiology, and also ARF and hyponatremia (Na 117). EP consulted for further recs regarding AAD.     #EtOH induced hepatic cirrhosis w/ weekly paracentesis w/ decompensation   #NICM s/p CRTD SJM  #AUSTIN Stage 3  #DM  #HCC    - Pt's device interrogated. Showing multiple episodes of VT in the last month s/p ATP (no shocks). Pt has not had any episodes since 4/6/23. He was started on Amiodarone load prior to discharge. His LFTs here are downtrending since admission. AST 72/ALT 73 (from /ALT 76). As patient is currently tolerating Amiodarone, would continue current load/dose and monitor LFTs closely. Alternative AAD limited d/t AUSTIN/CKD. If significant increase, can consider alternative.   - Would monitor blood cultures. Currently undergoing sepsis r/o workup, blood cultures pending. If patient becomes bacteremic, would recall for consideration of system extraction.   - Pt w/ ?syncopal episode in MICU at time of visit d/t hypotension (BP ~60/30s) - now on levo. LRL on device increased to 80bpm.   - Would recommend GOC discussion with patient/HCP. Pt is currently full code.   - Keep on tele. Keep K>4, Mg>2  - Discussed with EP attending and primary team.   - EP will sign off, please reconsult with any questions.

## 2023-04-13 NOTE — CONSULT NOTE ADULT - PROBLEM SELECTOR RECOMMENDATION 2
Pt. with hyperkalemia in setting of AUSTIN. Serum potassium most recently 5.5. Currently received medical management with Lokelma. Will follow up repeat labs. If refractory then plan for RRT as mentioned above.

## 2023-04-14 LAB
ALBUMIN SERPL ELPH-MCNC: 2.6 G/DL — LOW (ref 3.3–5)
ALP SERPL-CCNC: 85 U/L — SIGNIFICANT CHANGE UP (ref 40–120)
ALT FLD-CCNC: 65 U/L — HIGH (ref 10–45)
ANION GAP SERPL CALC-SCNC: 14 MMOL/L — SIGNIFICANT CHANGE UP (ref 5–17)
APTT BLD: 28.7 SEC — SIGNIFICANT CHANGE UP (ref 27.5–35.5)
AST SERPL-CCNC: 69 U/L — HIGH (ref 10–40)
BILIRUB SERPL-MCNC: 0.8 MG/DL — SIGNIFICANT CHANGE UP (ref 0.2–1.2)
BUN SERPL-MCNC: 79 MG/DL — HIGH (ref 7–23)
CALCIUM SERPL-MCNC: 8.2 MG/DL — LOW (ref 8.4–10.5)
CHLORIDE SERPL-SCNC: 92 MMOL/L — LOW (ref 96–108)
CO2 SERPL-SCNC: 16 MMOL/L — LOW (ref 22–31)
CREAT SERPL-MCNC: 2.45 MG/DL — HIGH (ref 0.5–1.3)
EGFR: 28 ML/MIN/1.73M2 — LOW
GAS PNL BLDV: SIGNIFICANT CHANGE UP
GLUCOSE BLDC GLUCOMTR-MCNC: 184 MG/DL — HIGH (ref 70–99)
GLUCOSE BLDC GLUCOMTR-MCNC: 202 MG/DL — HIGH (ref 70–99)
GLUCOSE BLDC GLUCOMTR-MCNC: 255 MG/DL — HIGH (ref 70–99)
GLUCOSE BLDC GLUCOMTR-MCNC: 274 MG/DL — HIGH (ref 70–99)
GLUCOSE SERPL-MCNC: 230 MG/DL — HIGH (ref 70–99)
HCT VFR BLD CALC: 29.2 % — LOW (ref 39–50)
HGB BLD-MCNC: 10.2 G/DL — LOW (ref 13–17)
INR BLD: 1.19 RATIO — HIGH (ref 0.88–1.16)
MAGNESIUM SERPL-MCNC: 2 MG/DL — SIGNIFICANT CHANGE UP (ref 1.6–2.6)
MCHC RBC-ENTMCNC: 34.1 PG — HIGH (ref 27–34)
MCHC RBC-ENTMCNC: 34.9 GM/DL — SIGNIFICANT CHANGE UP (ref 32–36)
MCV RBC AUTO: 97.7 FL — SIGNIFICANT CHANGE UP (ref 80–100)
NRBC # BLD: 0 /100 WBCS — SIGNIFICANT CHANGE UP (ref 0–0)
PHOSPHATE SERPL-MCNC: 4.1 MG/DL — SIGNIFICANT CHANGE UP (ref 2.5–4.5)
PLATELET # BLD AUTO: 161 K/UL — SIGNIFICANT CHANGE UP (ref 150–400)
POTASSIUM SERPL-MCNC: 5.3 MMOL/L — SIGNIFICANT CHANGE UP (ref 3.5–5.3)
POTASSIUM SERPL-SCNC: 5.3 MMOL/L — SIGNIFICANT CHANGE UP (ref 3.5–5.3)
PROT SERPL-MCNC: 5.5 G/DL — LOW (ref 6–8.3)
PROTHROM AB SERPL-ACNC: 13.8 SEC — HIGH (ref 10.5–13.4)
RBC # BLD: 2.99 M/UL — LOW (ref 4.2–5.8)
RBC # FLD: 12.9 % — SIGNIFICANT CHANGE UP (ref 10.3–14.5)
SODIUM SERPL-SCNC: 122 MMOL/L — LOW (ref 135–145)
WBC # BLD: 6.37 K/UL — SIGNIFICANT CHANGE UP (ref 3.8–10.5)
WBC # FLD AUTO: 6.37 K/UL — SIGNIFICANT CHANGE UP (ref 3.8–10.5)

## 2023-04-14 PROCEDURE — 99232 SBSQ HOSP IP/OBS MODERATE 35: CPT | Mod: GC

## 2023-04-14 PROCEDURE — 71250 CT THORAX DX C-: CPT | Mod: 26

## 2023-04-14 PROCEDURE — 93975 VASCULAR STUDY: CPT | Mod: 26

## 2023-04-14 PROCEDURE — 78306 BONE IMAGING WHOLE BODY: CPT | Mod: 26

## 2023-04-14 PROCEDURE — 78830 RP LOCLZJ TUM SPECT W/CT 1: CPT | Mod: 26

## 2023-04-14 PROCEDURE — 99233 SBSQ HOSP IP/OBS HIGH 50: CPT | Mod: GC

## 2023-04-14 PROCEDURE — 99291 CRITICAL CARE FIRST HOUR: CPT | Mod: GC

## 2023-04-14 RX ORDER — INSULIN GLARGINE 100 [IU]/ML
5 INJECTION, SOLUTION SUBCUTANEOUS AT BEDTIME
Refills: 0 | Status: DISCONTINUED | OUTPATIENT
Start: 2023-04-14 | End: 2023-04-15

## 2023-04-14 RX ORDER — POLYETHYLENE GLYCOL 3350 17 G/17G
17 POWDER, FOR SOLUTION ORAL DAILY
Refills: 0 | Status: DISCONTINUED | OUTPATIENT
Start: 2023-04-14 | End: 2023-04-15

## 2023-04-14 RX ORDER — NYSTATIN CREAM 100000 [USP'U]/G
1 CREAM TOPICAL EVERY 8 HOURS
Refills: 0 | Status: DISCONTINUED | OUTPATIENT
Start: 2023-04-14 | End: 2023-04-19

## 2023-04-14 RX ORDER — LIDOCAINE 4 G/100G
1 CREAM TOPICAL EVERY 24 HOURS
Refills: 0 | Status: DISCONTINUED | OUTPATIENT
Start: 2023-04-14 | End: 2023-04-19

## 2023-04-14 RX ORDER — INSULIN LISPRO 100/ML
VIAL (ML) SUBCUTANEOUS AT BEDTIME
Refills: 0 | Status: DISCONTINUED | OUTPATIENT
Start: 2023-04-14 | End: 2023-04-18

## 2023-04-14 RX ORDER — ALBUMIN HUMAN 25 %
100 VIAL (ML) INTRAVENOUS EVERY 6 HOURS
Refills: 0 | Status: COMPLETED | OUTPATIENT
Start: 2023-04-14 | End: 2023-04-15

## 2023-04-14 RX ORDER — INSULIN LISPRO 100/ML
VIAL (ML) SUBCUTANEOUS
Refills: 0 | Status: DISCONTINUED | OUTPATIENT
Start: 2023-04-14 | End: 2023-04-18

## 2023-04-14 RX ORDER — ACETAMINOPHEN 500 MG
1000 TABLET ORAL ONCE
Refills: 0 | Status: COMPLETED | OUTPATIENT
Start: 2023-04-14 | End: 2023-04-14

## 2023-04-14 RX ADMIN — Medication 2: at 07:49

## 2023-04-14 RX ADMIN — Medication 6: at 11:41

## 2023-04-14 RX ADMIN — Medication 1300 MILLIGRAM(S): at 15:14

## 2023-04-14 RX ADMIN — Medication 400 MILLIGRAM(S): at 21:18

## 2023-04-14 RX ADMIN — INSULIN GLARGINE 5 UNIT(S): 100 INJECTION, SOLUTION SUBCUTANEOUS at 22:32

## 2023-04-14 RX ADMIN — LIDOCAINE 1 PATCH: 4 CREAM TOPICAL at 21:18

## 2023-04-14 RX ADMIN — Medication 1000 MILLIGRAM(S): at 22:05

## 2023-04-14 RX ADMIN — Medication 50 MILLILITER(S): at 11:23

## 2023-04-14 RX ADMIN — Medication 50 MILLILITER(S): at 17:33

## 2023-04-14 RX ADMIN — PIPERACILLIN AND TAZOBACTAM 25 GRAM(S): 4; .5 INJECTION, POWDER, LYOPHILIZED, FOR SOLUTION INTRAVENOUS at 21:25

## 2023-04-14 RX ADMIN — Medication 50 MILLILITER(S): at 05:55

## 2023-04-14 RX ADMIN — CHLORHEXIDINE GLUCONATE 1 APPLICATION(S): 213 SOLUTION TOPICAL at 05:45

## 2023-04-14 RX ADMIN — HEPARIN SODIUM 5000 UNIT(S): 5000 INJECTION INTRAVENOUS; SUBCUTANEOUS at 22:26

## 2023-04-14 RX ADMIN — Medication 2: at 22:31

## 2023-04-14 RX ADMIN — PIPERACILLIN AND TAZOBACTAM 25 GRAM(S): 4; .5 INJECTION, POWDER, LYOPHILIZED, FOR SOLUTION INTRAVENOUS at 05:45

## 2023-04-14 RX ADMIN — PIPERACILLIN AND TAZOBACTAM 25 GRAM(S): 4; .5 INJECTION, POWDER, LYOPHILIZED, FOR SOLUTION INTRAVENOUS at 15:13

## 2023-04-14 RX ADMIN — Medication 4: at 16:54

## 2023-04-14 RX ADMIN — NYSTATIN CREAM 1 APPLICATION(S): 100000 CREAM TOPICAL at 21:18

## 2023-04-14 RX ADMIN — POLYETHYLENE GLYCOL 3350 17 GRAM(S): 17 POWDER, FOR SOLUTION ORAL at 17:33

## 2023-04-14 RX ADMIN — Medication 1300 MILLIGRAM(S): at 22:26

## 2023-04-14 RX ADMIN — OCTREOTIDE ACETATE 10 MICROGRAM(S)/HR: 200 INJECTION, SOLUTION INTRAVENOUS; SUBCUTANEOUS at 11:23

## 2023-04-14 RX ADMIN — HEPARIN SODIUM 5000 UNIT(S): 5000 INJECTION INTRAVENOUS; SUBCUTANEOUS at 15:14

## 2023-04-14 RX ADMIN — AMIODARONE HYDROCHLORIDE 400 MILLIGRAM(S): 400 TABLET ORAL at 05:38

## 2023-04-14 RX ADMIN — Medication 1300 MILLIGRAM(S): at 05:38

## 2023-04-14 RX ADMIN — HEPARIN SODIUM 5000 UNIT(S): 5000 INJECTION INTRAVENOUS; SUBCUTANEOUS at 05:38

## 2023-04-14 NOTE — PROGRESS NOTE ADULT - ATTENDING COMMENTS
1. Hypotension of unclear Etiology.   No signs of infection evident yet. Pt has responded to albumin and pressors. Good UO. Pressors decreasing.        Plan:  1.Titrate pressors for [id number]   2. Continue albumin  3. Follow up all cultures.  4. Continue Zosyn    2. Renal: Acute renal failure with hyperkalemia and hyponatremia. ENEDIAN 7.DDX hepatorenal syndrome vs intravascular depletion. Also DDX ATN.      Good UO. Creatinine decreasing. Good UO.     Continue Octreotide drip     Levophed for MAP>70     Continue Albumin q 6 hrs      3. Hyponatremia and hyperkalemia.      Pt alert and O x3. Non focal neuro exam.      Hyponatremia improving with NA bicarb tabs and albumin      Hyperkalemia improved.      Acidosis improving. Serum bicarb 20.      Hold of on  RRT fopr now.      Continue bicarbonate tabs    3.Liver. Decompensated  ETOH cirrhosis with ascites. Ascites does not appear infected.                  Pt is not encephalopathic.                Possible hepatorenal syndrome. Plan above  4. Hepatocellular carcinoma. Pt was being evaluated for possible embolization of liver lesions. On hold until hemodynamically stable.          5. Cardiac.  ETOH induced cardiomyopathy. Pt was on Coreg and Lisinopril. Hold due to hypotension.                        AICD has shown pt with multiple episodes of SVT and VTACH; Pt started on amiodarone. Appreciate EP input. Will continue                       amiodarone and follow LFTS..    No arrhythmias in MICU                        and follow LFTS.                        Base line HR increased from 60 to 80.  6.DVT prophylaxis: SQ heparin  .  7. GOC; Full code

## 2023-04-14 NOTE — PROGRESS NOTE ADULT - SUBJECTIVE AND OBJECTIVE BOX
CHIEF COMPLAINT:  Patient is a 67y old  Male who presents with a chief complaint of hypotension (2023 15:58)    HPI:      67 yr old male with PMHx NICM (EF 50% s/p [employer] biventricular AICD (, new generator 2022), HLD, DM2, EtOH cirrhosis (last drink spring 2022), Portal HTN, esophageal varices s/p banding with eventual eradication ([facility] health report 22), recurrent ascites requiring weekly LVP (~6-7 liters) with most recent LVP 4/10/23 with removal of 3 liters (endorsed by pt), Colon Ca (dx ~2022) with liver mets, HCC, (canceled [facility] living donor liver transplant due to insurance issues), recent eval with hepatology (Dr. Loza) 2023 and PST for liver directed therapy Y90 embolization with plan for mapping and shunt study 23 (Dr. Baron -I.RMireya),   recent hospitalization at [facility] 23-4/10/23 for AICD evaluation and received LVP (as above)            In the ED: VS T97.7 rectal, 73/47 in triage, HR 60s, satting well on RA. Dx para done in ED. Given IV fent push, CTX 2g, NS 500cc. Subsequently started on levo with BP now 90s-100s/50s. Labs signif for low Na 118, Cr 3.12  (1.39 on ), K 6.7 (hemolyzed).     (2023 12:38)      Interval Events:      REVIEW OF SYSTEMS:          OBJECTIVE:  ICU Vital Signs Last 24 Hrs  T(C): 36.5 (2023 00:00), Max: 36.5 (2023 08:53)  T(F): 97.7 (2023 00:00), Max: 97.7 (2023 08:53)  HR: 80 (2023 03:15) (59 - 80)  BP: 118/59 (2023 03:15) (62/35 - 137/60)  BP(mean): 80 (2023 03:15) (44 - 93)  ABP: --  ABP(mean): --  RR: 17 (2023 03:15) (12 - 20)  SpO2: 100% (2023 03:15) (99% - 100%)    O2 Parameters below as of 2023 20:00  Patient On (Oxygen Delivery Method): room air              04-13 @ 07:01  -  04-14 @ 04:17  --------------------------------------------------------  IN: 893.1 mL / OUT: 1320 mL / NET: -426.9 mL      CAPILLARY BLOOD GLUCOSE      POCT Blood Glucose.: 326 mg/dL (2023 21:19)          PHYSICAL EXAM:          HOSPITAL MEDICATIONS:  MEDICATIONS  (STANDING):  albumin human 25% IVPB 100 milliLiter(s) IV Intermittent every 6 hours  aMIOdarone    Tablet 400 milliGRAM(s) Oral daily  chlorhexidine 4% Liquid 1 Application(s) Topical <User Schedule>  dextrose 5%. 1000 milliLiter(s) (50 mL/Hr) IV Continuous <Continuous>  dextrose 5%. 1000 milliLiter(s) (100 mL/Hr) IV Continuous <Continuous>  dextrose 50% Injectable 25 Gram(s) IV Push once  dextrose 50% Injectable 12.5 Gram(s) IV Push once  dextrose 50% Injectable 25 Gram(s) IV Push once  glucagon  Injectable 1 milliGRAM(s) IntraMuscular once  heparin   Injectable 5000 Unit(s) SubCutaneous every 8 hours  insulin lispro (ADMELOG) corrective regimen sliding scale   SubCutaneous three times a day before meals  insulin lispro (ADMELOG) corrective regimen sliding scale   SubCutaneous at bedtime  norepinephrine Infusion 0.15 MICROgram(s)/kG/Min (20.1 mL/Hr) IV Continuous <Continuous>  octreotide  Infusion 50 MICROgram(s)/Hr (10 mL/Hr) IV Continuous <Continuous>  piperacillin/tazobactam IVPB. 3.375 Gram(s) IV Intermittent once  piperacillin/tazobactam IVPB.. 3.375 Gram(s) IV Intermittent every 8 hours  sodium bicarbonate 1300 milliGRAM(s) Oral three times a day    MEDICATIONS  (PRN):  dextrose Oral Gel 15 Gram(s) Oral once PRN Blood Glucose LESS THAN 70 milliGRAM(s)/deciliter      LABS:                        10.2   6.37  )-----------( 161      ( 2023 00:12 )             29.2     Hgb Trend: 10.2<--, 10.2<--  04-14    122<L>  |  92<L>  |  79<H>  ----------------------------<  230<H>  5.3   |  16<L>  |  2.45<H>    Ca    8.2<L>      2023 00:12  Phos  4.1       Mg     2.0         TPro  5.5<L>  /  Alb  2.6<L>  /  TBili  0.8  /  DBili  x   /  AST  69<H>  /  ALT  65<H>  /  AlkPhos  85  14    LIVER FUNCTIONS - ( 2023 00:12 )  Alb: 2.6 g/dL / Pro: 5.5 g/dL / ALK PHOS: 85 U/L / ALT: 65 U/L / AST: 69 U/L / GGT: x           Creatinine Trend: 2.45<--, 2.93<--, 3.06<--, 3.12<--, 3.20<--, 1.39<--  PT/INR - ( 2023 00:12 )   PT: 13.8 sec;   INR: 1.19 ratio         PTT - ( 2023 00:12 )  PTT:28.7 sec  Urinalysis Basic - ( 2023 11:55 )    Color: Yellow / Appearance: Slightly Turbid / S.022 / pH: x  Gluc: x / Ketone: Trace  / Bili: Small / Urobili: 2 mg/dL   Blood: x / Protein: 30 mg/dL / Nitrite: Negative   Leuk Esterase: Moderate / RBC: 3 /hpf / WBC 5 /HPF   Sq Epi: x / Non Sq Epi: x / Bacteria: Negative        Venous Blood Gas:   @ 00:00  7.33/38/39/20/61.9  VBG Lactate: 1.9  Venous Blood Gas:   @ 16:55  7.32/42/19/22/19.9  VBG Lactate: 1.7  Venous Blood Gas:   @ 16:40  7.32/42///28.9  VBG Lactate: 1.5  Venous Blood Gas:   @ 16:31  --/--/--/--/--  VBG Lactate: 1.5  Venous Blood Gas:   @ 16:19  --/--/--/--/--  VBG Lactate: 1.3  Venous Blood Gas:   @ 09:07  7.33/40///28.9  VBG Lactate: 1.9      MICROBIOLOGY:     RADIOLOGY:  [ ] Reviewed and interpreted by me    EKG:      Viv [facility] (ext [id number]) CHIEF COMPLAINT:  Patient is a 67y old  Male who presents with a chief complaint of hypotension (2023 15:58)    HPI:      67 yr old male with PMHx NICM (EF 50% s/p [employer] biventricular AICD (, new generator 2022), HLD, DM2, EtOH cirrhosis (last drink spring 2022), Portal HTN, esophageal varices s/p banding with eventual eradication ([facility] health report 22), recurrent ascites requiring weekly LVP (~6-7 liters) with most recent LVP 4/10/23 with removal of 8.3 liters, Colon Ca (dx ~2022) with liver mets, HCC, (canceled [facility] living donor liver transplant due to insurance issues), recent PST and eval with hepatology (Dr. Loza) 2023 for planned Y90 (Yttrium-90) liver embolization/mapping/shunt study 23 (Dr. Baron -I.R.), recent hospitalization at [facility] 23-4/10/23 for suspected NSVT and AICD evaluation tx with amiodarone therapy and subsequent LVP (as above).        Presented this hospitalization 23 after being found to be HYPOtn with SBP 70's/40's while being evaluated by I.R. for Y90 liver mapping, Pt in addition endorsed weakness, lightheadedness, dizziness over 3 day period post recent LVP. In E.D. (23) found to have HYPOtn with SBP 73/47, HYPOnatremic of 118, sCr 3.12 (baseline 1.39 23) requiring Norepi gtt. Admitted to MICU (23) for             In the ED: VS T97.7 rectal, 73/47 in triage, HR 60s, satting well on RA. Dx para done in ED. Given IV fent push, CTX 2g, NS 500cc. Subsequently started on levo with BP now 90s-100s/50s. Labs signif for low Na 118, Cr 3.12  (1.39 on ), K 6.7 (hemolyzed).     (2023 12:38)      Interval Events:      REVIEW OF SYSTEMS:          OBJECTIVE:  ICU Vital Signs Last 24 Hrs  T(C): 36.5 (2023 00:00), Max: 36.5 (2023 08:53)  T(F): 97.7 (2023 00:00), Max: 97.7 (2023 08:53)  HR: 80 (2023 03:15) (59 - 80)  BP: 118/59 (2023 03:15) (62/35 - 137/60)  BP(mean): 80 (2023 03:15) (44 - 93)  ABP: --  ABP(mean): --  RR: 17 (2023 03:15) (12 - 20)  SpO2: 100% (2023 03:15) (99% - 100%)    O2 Parameters below as of 2023 20:00  Patient On (Oxygen Delivery Method): room air              04-13 @ 07:01  -  04-14 @ 04:17  --------------------------------------------------------  IN: 893.1 mL / OUT: 1320 mL / NET: -426.9 mL      CAPILLARY BLOOD GLUCOSE      POCT Blood Glucose.: 326 mg/dL (2023 21:19)          PHYSICAL EXAM:          HOSPITAL MEDICATIONS:  MEDICATIONS  (STANDING):  albumin human 25% IVPB 100 milliLiter(s) IV Intermittent every 6 hours  aMIOdarone    Tablet 400 milliGRAM(s) Oral daily  chlorhexidine 4% Liquid 1 Application(s) Topical <User Schedule>  dextrose 5%. 1000 milliLiter(s) (50 mL/Hr) IV Continuous <Continuous>  dextrose 5%. 1000 milliLiter(s) (100 mL/Hr) IV Continuous <Continuous>  dextrose 50% Injectable 25 Gram(s) IV Push once  dextrose 50% Injectable 12.5 Gram(s) IV Push once  dextrose 50% Injectable 25 Gram(s) IV Push once  glucagon  Injectable 1 milliGRAM(s) IntraMuscular once  heparin   Injectable 5000 Unit(s) SubCutaneous every 8 hours  insulin lispro (ADMELOG) corrective regimen sliding scale   SubCutaneous three times a day before meals  insulin lispro (ADMELOG) corrective regimen sliding scale   SubCutaneous at bedtime  norepinephrine Infusion 0.15 MICROgram(s)/kG/Min (20.1 mL/Hr) IV Continuous <Continuous>  octreotide  Infusion 50 MICROgram(s)/Hr (10 mL/Hr) IV Continuous <Continuous>  piperacillin/tazobactam IVPB. 3.375 Gram(s) IV Intermittent once  piperacillin/tazobactam IVPB.. 3.375 Gram(s) IV Intermittent every 8 hours  sodium bicarbonate 1300 milliGRAM(s) Oral three times a day    MEDICATIONS  (PRN):  dextrose Oral Gel 15 Gram(s) Oral once PRN Blood Glucose LESS THAN 70 milliGRAM(s)/deciliter      LABS:                        10.2   6.37  )-----------( 161      ( 2023 00:12 )             29.2     Hgb Trend: 10.2<--, 10.2<--  04-14    122<L>  |  92<L>  |  79<H>  ----------------------------<  230<H>  5.3   |  16<L>  |  2.45<H>    Ca    8.2<L>      2023 00:12  Phos  4.1     04-14  Mg     2.0     -14    TPro  5.5<L>  /  Alb  2.6<L>  /  TBili  0.8  /  DBili  x   /  AST  69<H>  /  ALT  65<H>  /  AlkPhos  85  04-14    LIVER FUNCTIONS - ( 2023 00:12 )  Alb: 2.6 g/dL / Pro: 5.5 g/dL / ALK PHOS: 85 U/L / ALT: 65 U/L / AST: 69 U/L / GGT: x           Creatinine Trend: 2.45<--, 2.93<--, 3.06<--, 3.12<--, 3.20<--, 1.39<--  PT/INR - ( 2023 00:12 )   PT: 13.8 sec;   INR: 1.19 ratio         PTT - ( 2023 00:12 )  PTT:28.7 sec  Urinalysis Basic - ( 2023 11:55 )    Color: Yellow / Appearance: Slightly Turbid / S.022 / pH: x  Gluc: x / Ketone: Trace  / Bili: Small / Urobili: 2 mg/dL   Blood: x / Protein: 30 mg/dL / Nitrite: Negative   Leuk Esterase: Moderate / RBC: 3 /hpf / WBC 5 /HPF   Sq Epi: x / Non Sq Epi: x / Bacteria: Negative        Venous Blood Gas:   @ 00:00  7.33/38/39/20/61.9  VBG Lactate: 1.9  Venous Blood Gas:   @ 16:55  7.32/42/19/22/19.9  VBG Lactate: 1.7  Venous Blood Gas:   @ 16:40  7.32/42/24/22/28.9  VBG Lactate: 1.5  Venous Blood Gas:   @ 16:31  --/--/--/--/--  VBG Lactate: 1.5  Venous Blood Gas:   @ 16:19  --/--/--/--/--  VBG Lactate: 1.3  Venous Blood Gas:   @ 09:07  7.33/40/21/21/28.9  VBG Lactate: 1.9      MICROBIOLOGY:     RADIOLOGY:  [ ] Reviewed and interpreted by me    EKG:      [facility] (ext [id number]) CHIEF COMPLAINT:  Patient is a 67y old  Male who presents with a chief complaint of hypotension (2023 15:58)    HPI:      67 yr old male with PMHx NICM (HFimpEF 50%) s/p [employer] biventricular AICD (, new generator 2022), HLD, DM2, EtOH cirrhosis (last drink spring 2022), Portal HTN, esophageal varices s/p banding with eventual eradication ([facility] health report 22), recurrent ascites requiring weekly LVP (~6-7 liters) with most recent LVP 4/10/23 with removal of 8.3 liters, Colon Ca (dx ~2022) with liver mets, HCC, (canceled [facility] living donor liver transplant due to insurance issues), recent PST and eval with hepatology (Dr. Loza) 2023 for planned Y90 (Yttrium-90) liver embolization/mapping/shunt study on 23 (Dr. Baron -I.R.), recent hospitalization at [facility] 23-4/10/23 for suspected NSVT and AICD evaluation tx with amiodarone therapy and subsequent LVP (as above).       Patient Presented this hospitalization 23 after being found to be HYPOtn with SBP 70's/40's while being evaluated by I.R. for planned Y90 liver mapping, Pt in addition endorsed weakness, lightheadedness, dizziness over 3 day period post recent LVP. In E.D. (23) found to have HYPOtn with SBP 73/47, HYPOnatremic of 118, sCr 3.12 (baseline 1.39 23) requiring Norepi gtt. Admitted to MICU (23) for [place] due to suspected hypovolemic vs septic shock    Interval Events:      REVIEW OF SYSTEMS:          OBJECTIVE:  ICU Vital Signs Last 24 Hrs  T(C): 36.5 (2023 00:00), Max: 36.5 (2023 08:53)  T(F): 97.7 (2023 00:00), Max: 97.7 (2023 08:53)  HR: 80 (2023 03:15) (59 - 80)  BP: 118/59 (2023 03:15) (62/35 - 137/60)  BP(mean): 80 (2023 03:15) (44 - 93)  ABP: --  ABP(mean): --  RR: 17 (2023 03:15) (12 - 20)  SpO2: 100% (2023 03:15) (99% - 100%)    O2 Parameters below as of 2023 20:00  Patient On (Oxygen Delivery Method): room air              04-13 @ 07:01  -  04-14 @ 04:17  --------------------------------------------------------  IN: 893.1 mL / OUT: 1320 mL / NET: -426.9 mL      CAPILLARY BLOOD GLUCOSE      POCT Blood Glucose.: 326 mg/dL (2023 21:19)          PHYSICAL EXAM:          HOSPITAL MEDICATIONS:  MEDICATIONS  (STANDING):  albumin human 25% IVPB 100 milliLiter(s) IV Intermittent every 6 hours  aMIOdarone    Tablet 400 milliGRAM(s) Oral daily  chlorhexidine 4% Liquid 1 Application(s) Topical <User Schedule>  dextrose 5%. 1000 milliLiter(s) (50 mL/Hr) IV Continuous <Continuous>  dextrose 5%. 1000 milliLiter(s) (100 mL/Hr) IV Continuous <Continuous>  dextrose 50% Injectable 25 Gram(s) IV Push once  dextrose 50% Injectable 12.5 Gram(s) IV Push once  dextrose 50% Injectable 25 Gram(s) IV Push once  glucagon  Injectable 1 milliGRAM(s) IntraMuscular once  heparin   Injectable 5000 Unit(s) SubCutaneous every 8 hours  insulin lispro (ADMELOG) corrective regimen sliding scale   SubCutaneous three times a day before meals  insulin lispro (ADMELOG) corrective regimen sliding scale   SubCutaneous at bedtime  norepinephrine Infusion 0.15 MICROgram(s)/kG/Min (20.1 mL/Hr) IV Continuous <Continuous>  octreotide  Infusion 50 MICROgram(s)/Hr (10 mL/Hr) IV Continuous <Continuous>  piperacillin/tazobactam IVPB. 3.375 Gram(s) IV Intermittent once  piperacillin/tazobactam IVPB.. 3.375 Gram(s) IV Intermittent every 8 hours  sodium bicarbonate 1300 milliGRAM(s) Oral three times a day    MEDICATIONS  (PRN):  dextrose Oral Gel 15 Gram(s) Oral once PRN Blood Glucose LESS THAN 70 milliGRAM(s)/deciliter      LABS:                        10.2   6.37  )-----------( 161      ( 2023 00:12 )             29.2     Hgb Trend: 10.2<--, 10.2<--  04-14    122<L>  |  92<L>  |  79<H>  ----------------------------<  230<H>  5.3   |  16<L>  |  2.45<H>    Ca    8.2<L>      2023 00:12  Phos  4.1       Mg     2.0         TPro  5.5<L>  /  Alb  2.6<L>  /  TBili  0.8  /  DBili  x   /  AST  69<H>  /  ALT  65<H>  /  AlkPhos  85  14    LIVER FUNCTIONS - ( 2023 00:12 )  Alb: 2.6 g/dL / Pro: 5.5 g/dL / ALK PHOS: 85 U/L / ALT: 65 U/L / AST: 69 U/L / GGT: x           Creatinine Trend: 2.45<--, 2.93<--, 3.06<--, 3.12<--, 3.20<--, 1.39<--  PT/INR - ( 2023 00:12 )   PT: 13.8 sec;   INR: 1.19 ratio         PTT - ( 2023 00:12 )  PTT:28.7 sec  Urinalysis Basic - ( 2023 11:55 )    Color: Yellow / Appearance: Slightly Turbid / S.022 / pH: x  Gluc: x / Ketone: Trace  / Bili: Small / Urobili: 2 mg/dL   Blood: x / Protein: 30 mg/dL / Nitrite: Negative   Leuk Esterase: Moderate / RBC: 3 /hpf / WBC 5 /HPF   Sq Epi: x / Non Sq Epi: x / Bacteria: Negative        Venous Blood Gas:   @ 00:00  7.33/38/39/20/61.9  VBG Lactate: 1.9  Venous Blood Gas:   @ 16:55  7.32/42/19/22/19.9  VBG Lactate: 1.7  Venous Blood Gas:   @ 16:40  7.32/42/24/22/28.9  VBG Lactate: 1.5  Venous Blood Gas:   @ 16:31  --/--/--/--/--  VBG Lactate: 1.5  Venous Blood Gas:   @ 16:19  --/--/--/--/--  VBG Lactate: 1.3  Venous Blood Gas:   @ 09:07  7.33/40///28.9  VBG Lactate: 1.9      MICROBIOLOGY:     RADIOLOGY:  [ ] Reviewed and interpreted by me    EKG:      Viv [facility] (ext [id number]) CHIEF COMPLAINT:  Patient is a 67y old  Male who presents with a chief complaint of hypotension (2023 15:58)    HPI:      67 yr old male with PMHx NICM (HFimpEF 50%) s/p [employer] biventricular AICD (, new generator 2022), HLD, DM2, EtOH cirrhosis (last drink spring 2022), Portal HTN, esophageal varices s/p banding with eventual eradication ([facility] health report 22), recurrent ascites requiring weekly LVP (~6-7 liters) with most recent LVP 4/10/23 with removal of 8.3 liters, Colon Ca (dx ~2022) with liver mets, HCC, (canceled [facility] living donor liver transplant due to insurance issues), recent PST and eval with hepatology (Dr. Loza) 2023 for planned Y90 (Yttrium-90) liver embolization/mapping/shunt study on 23 (Dr. Baron -I.R.), recent hospitalization at [facility] 23-4/10/23 for suspected NSVT and AICD evaluation tx with amiodarone therapy and subsequent LVP (as above).       Patient Presented this hospitalization 23 after being found to be HYPOtn with SBP 70's/40's while being evaluated by I.R. for planned Y90 liver mapping, Pt in addition endorsed weakness, lightheadedness, dizziness over 3 day period post recent LVP. In E.D. (23) found to have HYPOtn with SBP 73/47, HYPOnatremic of 118, sCr 3.12 (baseline 1.39 23) requiring Norepi gtt. Admitted to MICU (23) for [place] due to suspected hypovolemic vs septic shock    Interval Events:      REVIEW OF SYSTEMS:      CONSTITUTIONAL:           No weakness, fevers or chills  EYES/ENT:           No visual changes;  No vertigo or throat pain   NECK:            No pain or stiffness  RESPIRATORY:            No cough, wheezing, hemoptysis; No shortness of breath  CARDIOVASCULAR:            No chest pain or palpitations  GASTROINTESTINAL:           No abdominal or epigastric pain. No nausea, vomiting, or hematemesis; No diarrhea or constipation, states last BM was 4/10. No melena or hematochezia.   GENITOURINARY:            No dysuria, frequency or hematuria  NEUROLOGICAL:            No numbness or weakness  SKIN:            No pruritis , rashes          OBJECTIVE:  ICU Vital Signs Last 24 Hrs  T(C): 36.5 (2023 00:00), Max: 36.5 (2023 08:53)  T(F): 97.7 (2023 00:00), Max: 97.7 (2023 08:53)  HR: 80 (2023 03:15) (59 - 80)  BP: 118/59 (2023 03:15) (62/35 - 137/60)  BP(mean): 80 (2023 03:15) (44 - 93)  ABP: --  ABP(mean): --  RR: 17 (2023 03:15) (12 - 20)  SpO2: 100% (2023 03:15) (99% - 100%)    O2 Parameters below as of 2023 20:00  Patient On (Oxygen Delivery Method): room air              04-13 @ 07:01  -  04-14 @ 04:17  --------------------------------------------------------  IN: 893.1 mL / OUT: 1320 mL / NET: -426.9 mL      CAPILLARY BLOOD GLUCOSE      POCT Blood Glucose.: 326 mg/dL (2023 21:19)    PHYSICAL EXAM:  GENERAL:   NAD, well-groomed, cachectic    HEAD:    Atraumatic, Normocephalic    EYES:   EOMI, PERRL 3 mm, conjunctiva and sclera clear    ENMT:   No oropharyngeal exudates, erythema or lesions,  Moist mucous membranes    NECK:   Supple, no cervical lymphadenopathy, no JVD    NERVOUS SYSTEM:  Alert & Oriented X3, CN II-XII intact, has spontaneous purposeful movement of all extremities; Upper extremities  4/5; Lower extremities 4/5, full sensation to light touch     CHEST/LUNG:   Utilizing Rm Air SPO2 100%  .Breath sounds bilaterally, diminished in lower lung fields bases to 1/4 up, No crackles, rhonchi, wheezing, or rubs. POCUS A line predominant, small area of consolidation in LLL field    HEART:   cardiac monitor NSR with occasional PVC    ; S1/S2 No murmurs, rubs, or gallops, POCUS slight diminished LVFx, RV slight less then LV, VTI of 13, IVC 1.66    ABDOMEN:   Soft, Nontender, grossly distended, tympanic with + wave ; Bowel sounds present, Bladder non distended, non palpable    EXTREMITIES:   Pulses palpable radial pulses 2+ bilat, DP/PT 1+/1+ bilat, without clubbing, cyanosis. Digits warm to touch with good cap refill < 3 secs    SKIN:   warm, dry, intact, normal color, no rash or abnormal lesions, without palpable nodes        HOSPITAL MEDICATIONS:  MEDICATIONS  (STANDING):  albumin human 25% IVPB 100 milliLiter(s) IV Intermittent every 6 hours  aMIOdarone    Tablet 400 milliGRAM(s) Oral daily  chlorhexidine 4% Liquid 1 Application(s) Topical <User Schedule>  dextrose 5%. 1000 milliLiter(s) (50 mL/Hr) IV Continuous <Continuous>  dextrose 5%. 1000 milliLiter(s) (100 mL/Hr) IV Continuous <Continuous>  dextrose 50% Injectable 25 Gram(s) IV Push once  dextrose 50% Injectable 12.5 Gram(s) IV Push once  dextrose 50% Injectable 25 Gram(s) IV Push once  glucagon  Injectable 1 milliGRAM(s) IntraMuscular once  heparin   Injectable 5000 Unit(s) SubCutaneous every 8 hours  insulin lispro (ADMELOG) corrective regimen sliding scale   SubCutaneous three times a day before meals  insulin lispro (ADMELOG) corrective regimen sliding scale   SubCutaneous at bedtime  norepinephrine Infusion 0.15 MICROgram(s)/kG/Min (20.1 mL/Hr) IV Continuous <Continuous>  octreotide  Infusion 50 MICROgram(s)/Hr (10 mL/Hr) IV Continuous <Continuous>  piperacillin/tazobactam IVPB. 3.375 Gram(s) IV Intermittent once  piperacillin/tazobactam IVPB.. 3.375 Gram(s) IV Intermittent every 8 hours  sodium bicarbonate 1300 milliGRAM(s) Oral three times a day    MEDICATIONS  (PRN):  dextrose Oral Gel 15 Gram(s) Oral once PRN Blood Glucose LESS THAN 70 milliGRAM(s)/deciliter      LABS:                        10.2   6.37  )-----------( 161      ( 2023 00:12 )             29.2     Hgb Trend: 10.2<--, 10.2<--  14    122<L>  |  92<L>  |  79<H>  ----------------------------<  230<H>  5.3   |  16<L>  |  2.45<H>    Ca    8.2<L>      2023 00:12  Phos  4.1       Mg     2.0         TPro  5.5<L>  /  Alb  2.6<L>  /  TBili  0.8  /  DBili  x   /  AST  69<H>  /  ALT  65<H>  /  AlkPhos  85      LIVER FUNCTIONS - ( 2023 00:12 )  Alb: 2.6 g/dL / Pro: 5.5 g/dL / ALK PHOS: 85 U/L / ALT: 65 U/L / AST: 69 U/L / GGT: x           Creatinine Trend: 2.45<--, 2.93<--, 3.06<--, 3.12<--, 3.20<--, 1.39<--  PT/INR - ( 2023 00:12 )   PT: 13.8 sec;   INR: 1.19 ratio         PTT - ( 2023 00:12 )  PTT:28.7 sec  Urinalysis Basic - ( 2023 11:55 )    Color: Yellow / Appearance: Slightly Turbid / S.022 / pH: x  Gluc: x / Ketone: Trace  / Bili: Small / Urobili: 2 mg/dL   Blood: x / Protein: 30 mg/dL / Nitrite: Negative   Leuk Esterase: Moderate / RBC: 3 /hpf / WBC 5 /HPF   Sq Epi: x / Non Sq Epi: x / Bacteria: Negative        Venous Blood Gas:   @ 00:00  7.33/38/39/20/61.9  VBG Lactate: 1.9  Venous Blood Gas:   @ 16:55  7.32/42/19/22/19.9  VBG Lactate: 1.7  Venous Blood Gas:   @ 16:40  7.32/42/24/22/28.9  VBG Lactate: 1.5  Venous Blood Gas:   @ 16:31  --/--/--/--/--  VBG Lactate: 1.5  Venous Blood Gas:   @ 16:19  --/--/--/--/--  VBG Lactate: 1.3  Venous Blood Gas:   @ 09:07  7.33/40/21/21/28.9  VBG Lactate: 1.9      MICROBIOLOGY:     RADIOLOGY:  [ ] Reviewed and interpreted by me    EKG:      Viv [facility] (ext [id number]) CHIEF COMPLAINT:  Patient is a 67y old  Male who presents with a chief complaint of hypotension (2023 15:58)    HPI:      67 yr old male with PMHx NICM (HFimpEF 50%) s/p [employer] biventricular AICD (, new generator 2022), HLD, DM2, EtOH cirrhosis (last drink spring 2022), Portal HTN, esophageal varices s/p banding with eventual eradication ([facility] health report 22), recurrent ascites requiring weekly LVP (~6-7 liters) with most recent LVP 4/10/23 with removal of 8.3 liters, Colon Ca (dx ~2022) with liver mets, HCC, (canceled [facility] living donor liver transplant due to insurance issues), recent PST and eval with hepatology (Dr. Loza) 2023 for planned Y90 (Yttrium-90) liver embolization/mapping/shunt study on 23 (Dr. Baron -I.[facility].), recent hospitalization at [facility] 23-4/10/23 for suspected NSVT and AICD evaluation tx with amiodarone therapy and subsequent LVP (as above).       Patient Presented this hospitalization 23 after being found to be HYPOtn with SBP 70's/40's while being evaluated by [facility]. for planned Y90 liver mapping, Pt in addition endorsed weakness, lightheadedness, dizziness over 3 day period post recent LVP. In E.D. (23) found to have HYPOtn with SBP 73/47, HYPOnatremic of 118, sCr 3.12 (baseline 1.39 23) requiring Norepi gtt. Admitted to MICU (23) for possible H[facility]-[place] due to suspected hypovolemic vs septic shock    Interval Events:      REVIEW OF SYSTEMS:      CONSTITUTIONAL:           No weakness, fevers or chills  EYES/ENT:           No visual changes;  No vertigo or throat pain   NECK:            No pain or stiffness  RESPIRATORY:            No cough, wheezing, hemoptysis; No shortness of breath  CARDIOVASCULAR:            No chest pain or palpitations  GASTROINTESTINAL:           No abdominal or epigastric pain. No nausea, vomiting, or hematemesis; No diarrhea or constipation, states last BM was 4/10. No melena or hematochezia.   GENITOURINARY:            No dysuria, frequency or hematuria  NEUROLOGICAL:            No numbness or weakness  SKIN:            No pruritis , rashes          OBJECTIVE:  ICU Vital Signs Last 24 Hrs  T(C): 36.5 (2023 00:00), Max: 36.5 (2023 08:53)  T(F): 97.7 (2023 00:00), Max: 97.7 (2023 08:53)  HR: 80 (2023 03:15) (59 - 80)  BP: 118/59 (2023 03:15) (62/35 - 137/60)  BP(mean): 80 (2023 03:15) (44 - 93)  ABP: --  ABP(mean): --  RR: 17 (2023 03:15) (12 - 20)  SpO2: 100% (2023 03:15) (99% - 100%)    O2 Parameters below as of 2023 20:00  Patient On (Oxygen Delivery Method): room air              04-13 @ 07:01  -  04-14 @ 04:17  --------------------------------------------------------  IN: 893.1 mL / OUT: 1320 mL / NET: -426.9 mL      CAPILLARY BLOOD GLUCOSE      POCT Blood Glucose.: 326 mg/dL (2023 21:19)    PHYSICAL EXAM:  GENERAL:   NAD, well-groomed, cachectic    HEAD:    Atraumatic, Normocephalic    EYES:   EOMI, PERRL 3 mm, conjunctiva and sclera clear    ENMT:   No oropharyngeal exudates, erythema or lesions,  Moist mucous membranes    NECK:   Supple, no cervical lymphadenopathy, no JVD    NERVOUS SYSTEM:  Alert & Oriented X3, CN II-XII intact, has spontaneous purposeful movement of all extremities; Upper extremities  4/5; Lower extremities 4/5, full sensation to light touch     CHEST/LUNG:   Utilizing Rm Air SPO2 100%  .Breath sounds bilaterally, diminished in lower lung fields bases to 1/4 up, No crackles, rhonchi, wheezing, or rubs. POCUS A line predominant, small area of consolidation in LLL field    HEART:   cardiac monitor NSR with occasional PVC    ; S1/S2 No murmurs, rubs, or gallops, POCUS slight diminished LVFx, RV slight less then LV, VTI of 13, IVC 1.66    ABDOMEN:   Soft, Nontender, grossly distended, tympanic with + wave ; Bowel sounds present, Bladder non distended, non palpable    EXTREMITIES:   Pulses palpable radial pulses 2+ bilat, DP/PT 1+/1+ bilat, without clubbing, cyanosis. Digits warm to touch with good cap refill < 3 secs    SKIN:   warm, dry, intact, normal color, no rash or abnormal lesions, without palpable nodes        HOSPITAL MEDICATIONS:  MEDICATIONS  (STANDING):  albumin human 25% IVPB 100 milliLiter(s) IV Intermittent every 6 hours  aMIOdarone    Tablet 400 milliGRAM(s) Oral daily  chlorhexidine 4% Liquid 1 Application(s) Topical <User Schedule>  dextrose 5%. 1000 milliLiter(s) (50 mL/Hr) IV Continuous <Continuous>  dextrose 5%. 1000 milliLiter(s) (100 mL/Hr) IV Continuous <Continuous>  dextrose 50% Injectable 25 Gram(s) IV Push once  dextrose 50% Injectable 12.5 Gram(s) IV Push once  dextrose 50% Injectable 25 Gram(s) IV Push once  glucagon  Injectable 1 milliGRAM(s) IntraMuscular once  heparin   Injectable 5000 Unit(s) SubCutaneous every 8 hours  insulin lispro (ADMELOG) corrective regimen sliding scale   SubCutaneous three times a day before meals  insulin lispro (ADMELOG) corrective regimen sliding scale   SubCutaneous at bedtime  norepinephrine Infusion 0.15 MICROgram(s)/kG/Min (20.1 mL/Hr) IV Continuous <Continuous>  octreotide  Infusion 50 MICROgram(s)/Hr (10 mL/Hr) IV Continuous <Continuous>  piperacillin/tazobactam IVPB. 3.375 Gram(s) IV Intermittent once  piperacillin/tazobactam IVPB.. 3.375 Gram(s) IV Intermittent every 8 hours  sodium bicarbonate 1300 milliGRAM(s) Oral three times a day    MEDICATIONS  (PRN):  dextrose Oral Gel 15 Gram(s) Oral once PRN Blood Glucose LESS THAN 70 milliGRAM(s)/deciliter      LABS:                        10.2   6.37  )-----------( 161      ( 2023 00:12 )             29.2     Hgb Trend: 10.2<--, 10.2<--  14    122<L>  |  92<L>  |  79<H>  ----------------------------<  230<H>  5.3   |  16<L>  |  2.45<H>    Ca    8.2<L>      2023 00:12  Phos  4.1       Mg     2.0         TPro  5.5<L>  /  Alb  2.6<L>  /  TBili  0.8  /  DBili  x   /  AST  69<H>  /  ALT  65<H>  /  AlkPhos  85      LIVER FUNCTIONS - ( 2023 00:12 )  Alb: 2.6 g/dL / Pro: 5.5 g/dL / ALK PHOS: 85 U/L / ALT: 65 U/L / AST: 69 U/L / GGT: x           Creatinine Trend: 2.45<--, 2.93<--, 3.06<--, 3.12<--, 3.20<--, 1.39<--  PT/INR - ( 2023 00:12 )   PT: 13.8 sec;   INR: 1.19 ratio         PTT - ( 2023 00:12 )  PTT:28.7 sec  Urinalysis Basic - ( 2023 11:55 )    Color: Yellow / Appearance: Slightly Turbid / S.022 / pH: x  Gluc: x / Ketone: Trace  / Bili: Small / Urobili: 2 mg/dL   Blood: x / Protein: 30 mg/dL / Nitrite: Negative   Leuk Esterase: Moderate / RBC: 3 /hpf / WBC 5 /HPF   Sq Epi: x / Non Sq Epi: x / Bacteria: Negative        Venous Blood Gas:   @ 00:00  7.33/38/39/20/61.9  VBG Lactate: 1.9  Venous Blood Gas:   @ 16:55  7.32/42/19/22/19.9  VBG Lactate: 1.7  Venous Blood Gas:   @ 16:40  7.32/42/24/22/28.9  VBG Lactate: 1.5  Venous Blood Gas:   @ 16:31  --/--/--/--/--  VBG Lactate: 1.5  Venous Blood Gas:   @ 16:19  --/--/--/--/--  VBG Lactate: 1.3  Venous Blood Gas:   @ 09:07  7.33/40//.9  VBG Lactate: 1.9      MICROBIOLOGY:     RADIOLOGY:  [ ] Reviewed and interpreted by me    EKG:      [facility] (ext [id number]) CHIEF COMPLAINT:  Patient is a 67y old  Male who presents with a chief complaint of hypotension (2023 15:58)    HPI:      67 yr old male with PMHx NICM (HFimpEF 50%) s/p [employer] biventricular AICD (, new generator 2022), HLD, DM2, EtOH cirrhosis (last drink spring 2022), Portal HTN, esophageal varices s/p banding with eventual eradication ([facility] health report 22), recurrent ascites requiring weekly LVP (~6-7 liters) with most recent LVP 4/10/23 with removal of 8.3 liters, Colon Ca (dx ~2022) with liver mets, HCC, (canceled [facility] living donor liver transplant due to insurance issues), recent PST and eval with hepatology (Dr. Loza) 2023 for planned Y90 (Yttrium-90) liver embolization/mapping/shunt study on 23 (Dr. Baron -I.R.), recent hospitalization at [facility] 23-4/10/23 for suspected NSVT and AICD evaluation tx with amiodarone therapy and subsequent LVP (as above). AICD interrogated 23 demonstrated episodes of Vtach without shocks, no episodes since 23.      Patient Presented this hospitalization 23 after being found to be HYPOtn with SBP 70's/40's while being evaluated by I.R. for planned Y90 liver mapping, Pt in addition endorsed weakness, lightheadedness, dizziness over 3 day period post recent LVP. In E.D. (23) found to have HYPOtn with SBP 73/47, HYPOnatremic of 118, sCr 3.12 (baseline 1.39 23) requiring Norepi gtt. Admitted to MICU (23) for decompensated cirrhosis, possible HRS-[place] due to suspected hypovolemic vs septic shock    Interval Events:      REVIEW OF SYSTEMS:      CONSTITUTIONAL:           No weakness, fevers or chills  EYES/ENT:           No visual changes;  No vertigo or throat pain   NECK:            No pain or stiffness  RESPIRATORY:            No cough, wheezing, hemoptysis; No shortness of breath  CARDIOVASCULAR:            No chest pain or palpitations  GASTROINTESTINAL:           No abdominal or epigastric pain. No nausea, vomiting, or hematemesis; No diarrhea or constipation, states last BM was 4/10. No melena or hematochezia.   GENITOURINARY:            No dysuria, frequency or hematuria  NEUROLOGICAL:            No numbness or weakness  SKIN:            No pruritis , rashes          OBJECTIVE:  ICU Vital Signs Last 24 Hrs  T(C): 36.5 (2023 00:00), Max: 36.5 (2023 08:53)  T(F): 97.7 (2023 00:00), Max: 97.7 (2023 08:53)  HR: 80 (2023 03:15) (59 - 80)  BP: 118/59 (2023 03:15) (62/35 - 137/60)  BP(mean): 80 (2023 03:15) (44 - 93)  ABP: --  ABP(mean): --  RR: 17 (2023 03:15) (12 - 20)  SpO2: 100% (2023 03:15) (99% - 100%)    O2 Parameters below as of 2023 20:00  Patient On (Oxygen Delivery Method): room air              04-13 @ 07:01  -  04-14 @ 04:17  --------------------------------------------------------  IN: 893.1 mL / OUT: 1320 mL / NET: -426.9 mL      CAPILLARY BLOOD GLUCOSE      POCT Blood Glucose.: 326 mg/dL (2023 21:19)    PHYSICAL EXAM:  GENERAL:   NAD, well-groomed, cachectic    HEAD:    Atraumatic, Normocephalic    EYES:   EOMI, PERRL 3 mm, conjunctiva and sclera clear    ENMT:   No oropharyngeal exudates, erythema or lesions,  Moist mucous membranes    NECK:   Supple, no cervical lymphadenopathy, no JVD    NERVOUS SYSTEM:  Alert & Oriented X3, CN II-XII intact, has spontaneous purposeful movement of all extremities; Upper extremities  4/5; Lower extremities 4/5, full sensation to light touch     CHEST/LUNG:   Utilizing Rm Air SPO2 100%  .Breath sounds bilaterally, diminished in lower lung fields bases to 1/4 up, No crackles, rhonchi, wheezing, or rubs. POCUS A line predominant, small area of consolidation in LLL field    HEART:   cardiac monitor NSR with occasional PVC    ; S1/S2 No murmurs, rubs, or gallops, POCUS slight diminished LVFx, RV slight less then LV, VTI of 13, IVC 1.66    ABDOMEN:   Soft, Nontender, grossly distended, tympanic with + wave ; Bowel sounds present, Bladder non distended, non palpable    EXTREMITIES:   Pulses palpable radial pulses 2+ bilat, DP/PT 1+/1+ bilat, without clubbing, cyanosis. Digits warm to touch with good cap refill < 3 secs    SKIN:   warm, dry, intact, normal color, no rash or abnormal lesions, without palpable nodes        HOSPITAL MEDICATIONS:  MEDICATIONS  (STANDING):  albumin human 25% IVPB 100 milliLiter(s) IV Intermittent every 6 hours  aMIOdarone    Tablet 400 milliGRAM(s) Oral daily  chlorhexidine 4% Liquid 1 Application(s) Topical <User Schedule>  dextrose 5%. 1000 milliLiter(s) (50 mL/Hr) IV Continuous <Continuous>  dextrose 5%. 1000 milliLiter(s) (100 mL/Hr) IV Continuous <Continuous>  dextrose 50% Injectable 25 Gram(s) IV Push once  dextrose 50% Injectable 12.5 Gram(s) IV Push once  dextrose 50% Injectable 25 Gram(s) IV Push once  glucagon  Injectable 1 milliGRAM(s) IntraMuscular once  heparin   Injectable 5000 Unit(s) SubCutaneous every 8 hours  insulin lispro (ADMELOG) corrective regimen sliding scale   SubCutaneous three times a day before meals  insulin lispro (ADMELOG) corrective regimen sliding scale   SubCutaneous at bedtime  norepinephrine Infusion 0.15 MICROgram(s)/kG/Min (20.1 mL/Hr) IV Continuous <Continuous>  octreotide  Infusion 50 MICROgram(s)/Hr (10 mL/Hr) IV Continuous <Continuous>  piperacillin/tazobactam IVPB. 3.375 Gram(s) IV Intermittent once  piperacillin/tazobactam IVPB.. 3.375 Gram(s) IV Intermittent every 8 hours  sodium bicarbonate 1300 milliGRAM(s) Oral three times a day    MEDICATIONS  (PRN):  dextrose Oral Gel 15 Gram(s) Oral once PRN Blood Glucose LESS THAN 70 milliGRAM(s)/deciliter      LABS:                        10.2   6.37  )-----------( 161      ( 2023 00:12 )             29.2     Hgb Trend: 10.2<--, 10.2<--  0414    122<L>  |  92<L>  |  79<H>  ----------------------------<  230<H>  5.3   |  16<L>  |  2.45<H>    Ca    8.2<L>      2023 00:12  Phos  4.1       Mg     2.0         TPro  5.5<L>  /  Alb  2.6<L>  /  TBili  0.8  /  DBili  x   /  AST  69<H>  /  ALT  65<H>  /  AlkPhos  85      LIVER FUNCTIONS - ( 2023 00:12 )  Alb: 2.6 g/dL / Pro: 5.5 g/dL / ALK PHOS: 85 U/L / ALT: 65 U/L / AST: 69 U/L / GGT: x           Creatinine Trend: 2.45<--, 2.93<--, 3.06<--, 3.12<--, 3.20<--, 1.39<--  PT/INR - ( 2023 00:12 )   PT: 13.8 sec;   INR: 1.19 ratio         PTT - ( 2023 00:12 )  PTT:28.7 sec  Urinalysis Basic - ( 2023 11:55 )    Color: Yellow / Appearance: Slightly Turbid / S.022 / pH: x  Gluc: x / Ketone: Trace  / Bili: Small / Urobili: 2 mg/dL   Blood: x / Protein: 30 mg/dL / Nitrite: Negative   Leuk Esterase: Moderate / RBC: 3 /hpf / WBC 5 /HPF   Sq Epi: x / Non Sq Epi: x / Bacteria: Negative        Venous Blood Gas:   @ 00:00  7.33/38/39/20/61.9  VBG Lactate: 1.9  Venous Blood Gas:   @ 16:55  7.32/42/19/22/19.9  VBG Lactate: 1.7  Venous Blood Gas:   @ 16:40  7.32/42///.9  VBG Lactate: 1.5  Venous Blood Gas:   @ 16:31  --/--/--/--/--  VBG Lactate: 1.5  Venous Blood Gas:   @ 16:19  --/--/--/--/--  VBG Lactate: 1.3  Venous Blood Gas:   @ 09:07  7.33/40///.9  VBG Lactate: 1.9      MICROBIOLOGY:     RADIOLOGY:  [ ] Reviewed and interpreted by me    EKG:      [facility] (ext [id number]) CHIEF COMPLAINT:  Patient is a 67y old  Male who presents with a chief complaint of hypotension (2023 15:58)    HPI:      67 yr old male with PMHx NICM (HFimpEF 50%) s/p [employer] biventricular AICD (, new generator 2022), HLD, DM2, EtOH cirrhosis (last drink spring 2022), Portal HTN, esophageal varices s/p banding with eventual eradication ([facility] health report 22), recurrent ascites requiring weekly LVP (~6-7 liters) with most recent LVP 4/10/23 with removal of 8.3 liters, Colon Ca (dx ~2022) with ? liver mets, HCC, (canceled [facility] living donor liver transplant due to insurance issues), recent PST and eval with hepatology (Dr. Loza) 2023 for planned Y90 (Yttrium-90) liver embolization/mapping/shunt study on 23 (Dr. Baron -I.R.), recent hospitalization at [facility] 23-4/10/23 for suspected NSVT and AICD evaluation tx with amiodarone therapy and subsequent LVP (as above). AICD interrogated 23 demonstrated episodes of Vtach without shocks, no episodes since 23.      Patient Presented this hospitalization 23 after being found to be HYPOtn with SBP 70's/40's while being evaluated by I.R. for planned Y90 liver mapping, Pt in addition endorsed weakness, lightheadedness, dizziness over 3 day period post recent LVP. In E.D. (23) found to have HYPOtn with SBP 73/47, HYPOnatremic of 118, sCr 3.12 (baseline 1.39 23) requiring Norepi gtt. Admitted to MICU (23) for decompensated cirrhosis, possible HRS-[place] due to suspected hypovolemic vs septic shock    Interval Events:      REVIEW OF SYSTEMS:      CONSTITUTIONAL:           No weakness, fevers or chills  EYES/ENT:           No visual changes;  No vertigo or throat pain   NECK:            No pain or stiffness  RESPIRATORY:            No cough, wheezing, hemoptysis; No shortness of breath  CARDIOVASCULAR:            No chest pain or palpitations  GASTROINTESTINAL:           No abdominal or epigastric pain. No nausea, vomiting, or hematemesis; No diarrhea or constipation, states last BM was 4/10. No melena or hematochezia.   GENITOURINARY:            No dysuria, frequency or hematuria  NEUROLOGICAL:            No numbness or weakness  SKIN:            No pruritis , rashes          OBJECTIVE:  ICU Vital Signs Last 24 Hrs  T(C): 36.5 (2023 00:00), Max: 36.5 (2023 08:53)  T(F): 97.7 (2023 00:00), Max: 97.7 (2023 08:53)  HR: 80 (2023 03:15) (59 - 80)  BP: 118/59 (2023 03:15) (62/35 - 137/60)  BP(mean): 80 (2023 03:15) (44 - 93)  ABP: --  ABP(mean): --  RR: 17 (2023 03:15) (12 - 20)  SpO2: 100% (2023 03:15) (99% - 100%)    O2 Parameters below as of 2023 20:00  Patient On (Oxygen Delivery Method): room air              04-13 @ 07:01  -  04-14 @ 04:17  --------------------------------------------------------  IN: 893.1 mL / OUT: 1320 mL / NET: -426.9 mL      CAPILLARY BLOOD GLUCOSE      POCT Blood Glucose.: 326 mg/dL (2023 21:19)    PHYSICAL EXAM:  GENERAL:   NAD, well-groomed, cachectic    HEAD:    Atraumatic, Normocephalic    EYES:   EOMI, PERRL 3 mm, conjunctiva and sclera clear    ENMT:   No oropharyngeal exudates, erythema or lesions,  Moist mucous membranes    NECK:   Supple, no cervical lymphadenopathy, no JVD    NERVOUS SYSTEM:  Alert & Oriented X3, CN II-XII intact, has spontaneous purposeful movement of all extremities; Upper extremities  4/5; Lower extremities 4/5, full sensation to light touch     CHEST/LUNG:   Utilizing Rm Air SPO2 100%  .Breath sounds bilaterally, diminished in lower lung fields bases to 1/4 up, No crackles, rhonchi, wheezing, or rubs. POCUS A line predominant, small area of consolidation in LLL field    HEART:   cardiac monitor NSR with occasional PVC    ; S1/S2 No murmurs, rubs, or gallops, POCUS slight diminished LVFx, RV slight less then LV, VTI of 13, IVC 1.66    ABDOMEN:   Soft, Nontender, grossly distended, tympanic with + wave ; Bowel sounds present, Bladder non distended, non palpable    EXTREMITIES:   Pulses palpable radial pulses 2+ bilat, DP/PT 1+/1+ bilat, without clubbing, cyanosis. Digits warm to touch with good cap refill < 3 secs    SKIN:   warm, dry, intact, normal color, no rash or abnormal lesions, without palpable nodes        HOSPITAL MEDICATIONS:  MEDICATIONS  (STANDING):  albumin human 25% IVPB 100 milliLiter(s) IV Intermittent every 6 hours  aMIOdarone    Tablet 400 milliGRAM(s) Oral daily  chlorhexidine 4% Liquid 1 Application(s) Topical <User Schedule>  dextrose 5%. 1000 milliLiter(s) (50 mL/Hr) IV Continuous <Continuous>  dextrose 5%. 1000 milliLiter(s) (100 mL/Hr) IV Continuous <Continuous>  dextrose 50% Injectable 25 Gram(s) IV Push once  dextrose 50% Injectable 12.5 Gram(s) IV Push once  dextrose 50% Injectable 25 Gram(s) IV Push once  glucagon  Injectable 1 milliGRAM(s) IntraMuscular once  heparin   Injectable 5000 Unit(s) SubCutaneous every 8 hours  insulin lispro (ADMELOG) corrective regimen sliding scale   SubCutaneous three times a day before meals  insulin lispro (ADMELOG) corrective regimen sliding scale   SubCutaneous at bedtime  norepinephrine Infusion 0.15 MICROgram(s)/kG/Min (20.1 mL/Hr) IV Continuous <Continuous>  octreotide  Infusion 50 MICROgram(s)/Hr (10 mL/Hr) IV Continuous <Continuous>  piperacillin/tazobactam IVPB. 3.375 Gram(s) IV Intermittent once  piperacillin/tazobactam IVPB.. 3.375 Gram(s) IV Intermittent every 8 hours  sodium bicarbonate 1300 milliGRAM(s) Oral three times a day    MEDICATIONS  (PRN):  dextrose Oral Gel 15 Gram(s) Oral once PRN Blood Glucose LESS THAN 70 milliGRAM(s)/deciliter      LABS:                        10.2   6.37  )-----------( 161      ( 2023 00:12 )             29.2     Hgb Trend: 10.2<--, 10.2<--      122<L>  |  92<L>  |  79<H>  ----------------------------<  230<H>  5.3   |  16<L>  |  2.45<H>    Ca    8.2<L>      2023 00:12  Phos  4.1       Mg     2.0         TPro  5.5<L>  /  Alb  2.6<L>  /  TBili  0.8  /  DBili  x   /  AST  69<H>  /  ALT  65<H>  /  AlkPhos  85      LIVER FUNCTIONS - ( 2023 00:12 )  Alb: 2.6 g/dL / Pro: 5.5 g/dL / ALK PHOS: 85 U/L / ALT: 65 U/L / AST: 69 U/L / GGT: x           Creatinine Trend: 2.45<--, 2.93<--, 3.06<--, 3.12<--, 3.20<--, 1.39<--  PT/INR - ( 2023 00:12 )   PT: 13.8 sec;   INR: 1.19 ratio         PTT - ( 2023 00:12 )  PTT:28.7 sec  Urinalysis Basic - ( 2023 11:55 )    Color: Yellow / Appearance: Slightly Turbid / S.022 / pH: x  Gluc: x / Ketone: Trace  / Bili: Small / Urobili: 2 mg/dL   Blood: x / Protein: 30 mg/dL / Nitrite: Negative   Leuk Esterase: Moderate / RBC: 3 /hpf / WBC 5 /HPF   Sq Epi: x / Non Sq Epi: x / Bacteria: Negative        Venous Blood Gas:   @ 00:00  7.33/38/39/20/61.9  VBG Lactate: 1.9  Venous Blood Gas:   @ 16:55  7.32/42/19/22/19.9  VBG Lactate: 1.7  Venous Blood Gas:   @ 16:40  7.32/42///.9  VBG Lactate: 1.5  Venous Blood Gas:   @ 16:31  --/--/--/--/--  VBG Lactate: 1.5  Venous Blood Gas:   @ 16:19  --/--/--/--/--  VBG Lactate: 1.3  Venous Blood Gas:   @ 09:07  7.33/40//.9  VBG Lactate: 1.9      MICROBIOLOGY:     RADIOLOGY:  [ ] Reviewed and interpreted by me    EKG:      [facility] (ext [id number])

## 2023-04-14 NOTE — DIETITIAN INITIAL EVALUATION ADULT - PERTINENT MEDS FT
MEDICATIONS  (STANDING):  albumin human 25% IVPB 100 milliLiter(s) IV Intermittent every 6 hours  aMIOdarone    Tablet 400 milliGRAM(s) Oral daily  chlorhexidine 4% Liquid 1 Application(s) Topical <User Schedule>  dextrose 5%. 1000 milliLiter(s) (50 mL/Hr) IV Continuous <Continuous>  dextrose 5%. 1000 milliLiter(s) (100 mL/Hr) IV Continuous <Continuous>  dextrose 50% Injectable 25 Gram(s) IV Push once  dextrose 50% Injectable 12.5 Gram(s) IV Push once  dextrose 50% Injectable 25 Gram(s) IV Push once  glucagon  Injectable 1 milliGRAM(s) IntraMuscular once  heparin   Injectable 5000 Unit(s) SubCutaneous every 8 hours  insulin lispro (ADMELOG) corrective regimen sliding scale   SubCutaneous three times a day before meals  insulin lispro (ADMELOG) corrective regimen sliding scale   SubCutaneous at bedtime  norepinephrine Infusion 0.14 MICROgram(s)/kG/Min (18.8 mL/Hr) IV Continuous <Continuous>  octreotide  Infusion 50 MICROgram(s)/Hr (10 mL/Hr) IV Continuous <Continuous>  piperacillin/tazobactam IVPB.. 3.375 Gram(s) IV Intermittent every 8 hours  sodium bicarbonate 1300 milliGRAM(s) Oral three times a day    MEDICATIONS  (PRN):  dextrose Oral Gel 15 Gram(s) Oral once PRN Blood Glucose LESS THAN 70 milliGRAM(s)/deciliter

## 2023-04-14 NOTE — PROGRESS NOTE ADULT - SUBJECTIVE AND OBJECTIVE BOX
[facility] DIVISION OF KIDNEY DISEASES AND HYPERTENSION -- FOLLOW UP NOTE  --------------------------------------------------------------------------------  67 year old male with PMH of HTN, HLD, DM, ETOH liver cirrhosis complicated by esophageal varices s/p banding, portal HTN, HCC, and CKD who presented to the hospital with several days of weakness and unsteadiness. The nephrology team was consulted for AUSTIN on CKD and hypotension. [facility] noted to have a SCr of 1.39 on 4/6 and on admission elevated to 3.2 and now improved to 2.45    24 hour events/subjective:  The patient was seen and examined earlier today in the MICU. Reports feeling okay without any chest pain or shortness of breath. Does admit to increased abdominal distension.       PAST HISTORY  --------------------------------------------------------------------------------  No significant changes to PMH, PSH, FHx, SHx, unless otherwise noted    ALLERGIES & MEDICATIONS  --------------------------------------------------------------------------------  Allergies    No Known Allergies    Intolerances      Standing Inpatient Medications  albumin human 25% IVPB 100 milliLiter(s) IV Intermittent every 6 hours  aMIOdarone    Tablet 400 milliGRAM(s) Oral daily  chlorhexidine 4% Liquid 1 Application(s) Topical <User Schedule>  dextrose 5%. 1000 milliLiter(s) IV Continuous <Continuous>  dextrose 5%. 1000 milliLiter(s) IV Continuous <Continuous>  dextrose 50% Injectable 25 Gram(s) IV Push once  dextrose 50% Injectable 12.5 Gram(s) IV Push once  dextrose 50% Injectable 25 Gram(s) IV Push once  glucagon  Injectable 1 milliGRAM(s) IntraMuscular once  heparin   Injectable 5000 Unit(s) SubCutaneous every 8 hours  insulin lispro (ADMELOG) corrective regimen sliding scale   SubCutaneous three times a day before meals  insulin lispro (ADMELOG) corrective regimen sliding scale   SubCutaneous at bedtime  norepinephrine Infusion 0.15 MICROgram(s)/kG/Min IV Continuous <Continuous>  octreotide  Infusion 50 MICROgram(s)/Hr IV Continuous <Continuous>  piperacillin/tazobactam IVPB.. 3.375 Gram(s) IV Intermittent every 8 hours  sodium bicarbonate 1300 milliGRAM(s) Oral three times a day    PRN Inpatient Medications  dextrose Oral Gel 15 Gram(s) Oral once PRN      REVIEW OF SYSTEMS      All other systems were reviewed and are negative, except as noted.    VITALS/PHYSICAL EXAM  --------------------------------------------------------------------------------  T(C): 36.5 (04-14-23 @ 04:00), Max: 36.5 (04-13-23 @ 08:53)  HR: 80 (04-14-23 @ 06:00) (59 - 80)  BP: 112/55 (04-14-23 @ 06:00) (62/35 - 137/60)  RR: 24 (04-14-23 @ 06:00) (12 - 24)  SpO2: 100% (04-14-23 @ 06:00) (99% - 100%)  Wt(kg): --  Height (cm): 182.9 (04-13-23 @ 13:17)  Weight (kg): 71.6 (04-13-23 @ 13:17)  BMI (kg/m2): 21.4 (04-13-23 @ 13:17)  BSA (m2): 1.93 (04-13-23 @ 13:17)      04-13-23 @ 07:01  -  04-14-23 @ 06:35  --------------------------------------------------------  IN: 1100.3 mL / OUT: 1720 mL / NET: -619.7 mL        Physical Exam:  	Gen: frail  	HEENT: MMM  	Pulm: CTA B/L  	CV: S1S2  	Abd: distended  	Ext: pitting LE edema B/L  	Neuro: Awake  	Skin: Warm and dry    LABS/STUDIES  --------------------------------------------------------------------------------              10.2   6.37  >-----------<  161      [04-14-23 @ 00:12]              29.2     122  |  92  |  79  ----------------------------<  230      [04-14-23 @ 00:12]  5.3   |  16  |  2.45        Ca     8.2     [04-14-23 @ 00:12]      Mg     2.0     [04-14-23 @ 00:12]      Phos  4.1     [04-14-23 @ 00:12]    TPro  5.5  /  Alb  2.6  /  TBili  0.8  /  DBili  x   /  AST  69  /  ALT  65  /  AlkPhos  85  [04-14-23 @ 00:12]    PT/INR: PT 13.8 , INR 1.19       [04-14-23 @ 00:12]  PTT: 28.7       [04-14-23 @ 00:12]      Creatinine Trend:  SCr 2.45 [04-14 @ 00:12]  SCr 2.93 [04-13 @ 16:18]  SCr 3.06 [04-13 @ 12:31]  SCr 3.12 [04-13 @ 09:53]  SCr 3.20 [04-13 @ 08:17]    Urinalysis - [04-13-23 @ 11:55]      Color Yellow / Appearance Slightly Turbid / SG 1.022 / pH 5.5      Gluc Negative / Ketone Trace  / Bili Small / Urobili 2 mg/dL       Blood Negative / Protein 30 mg/dL / Leuk Est Moderate / Nitrite Negative      RBC 3 / WBC 5 / Hyaline 15 / Gran  / Sq Epi  / Non Sq Epi  / Bacteria Negative    Urine Creatinine 265      [04-13-23 @ 11:56]  Urine Protein 50      [04-13-23 @ 11:56]  Urine Sodium 7      [04-13-23 @ 11:56]  Urine Urea Nitrogen 304      [04-13-23 @ 11:56]  Urine Potassium 52      [04-13-23 @ 11:56]  Urine Osmolality 333      [04-13-23 @ 11:56]    HbA1c 6.5      [03-11-19 @ 11:55]

## 2023-04-14 NOTE — PROGRESS NOTE ADULT - ASSESSMENT
67 year old male w.  HTN, HLD, [employer] AICD implant in 2014 with new generator placement 12/2022, T2DM, ETOH cirrhosis complicated by esophageal varices s/p banding in 2022, recurrent ascites requiring frequent paracentesis almost weekly (approx 6-7L) with last paracentesis on Monday with 8.3 L removed presents from IR due to hypotension and malaise. Patient reported recent admission on last Friday on [facility] after his cardiologist called him to present to the ED after his AICD fired 10 times. During his admission, he reports nothing was found wrong with him and ultimately was DC on Monday after being tapped. Did report feeling weak and dizzy during Tues/Wed but denies any fever, chills, N/V, abd pain, diarrhea, dysuria or SOB. In the ED, found to be hypotensive s/p 500 cc of IVF and started on Ceftriaxone. Hepatology called for further evaluation.     #Decompensated ETOH Cirrhosis, MELD NA 26 (driven by Sodium)  -EV: s/p banding on 2022  -Ascites: requiring weekly paracentesis, last on 4/10 with 8.3 L removed. Diagnostic tap on 4/13 negative for SBP  -Multifocal HCC+with two LIRAD 5 lesion in the Rt hepatic lobe, largest measuring 5.8 cm, pending mapping with IR for Y90 therapy  -HE: none    #AUSTIN stage 3  #Hyponatremia  #Shock, concerning for cardiogenic vs septic shock:  -tap negative for SBP, UA clean. pending further work up  #Nonischemic cardiomyopathy s/p AICD    Recommendations:  -Continue w/ Zosyn  -Follow up urine culture, CXR, blood culture  -Hold home BB in setting of AUSTIN  -Obtain Liver US with doppler to rule out thrombus  -Given AUSTIN, would limit paracentesis to 2-3 L only and replete with Albumin with 1:1  -Consider EP consult for device interrogation given reported shocks on Friday  -Obtain TTE  -Treat hyperkalemia per medical team  -strict I/Os  -daily INR and CMP    Recommendations preliminary until signed by attending.  67 year old male w.  HTN, HLD, [employer] AICD implant in 2014 with new generator placement 12/2022, T2DM, ETOH cirrhosis complicated by esophageal varices s/p banding in 2022, recurrent ascites requiring frequent paracentesis almost weekly (approx 6-7L) with last paracentesis on Monday with 8.3 L removed presents from IR due to hypotension and malaise. Patient reported recent admission on last Friday on [facility] after his cardiologist called him to present to the ED after his AICD fired 10 times. During his admission, he reports nothing was found wrong with him and ultimately was DC on Monday after being tapped. Did report feeling weak and dizzy during Tues/Wed but denies any fever, chills, N/V, abd pain, diarrhea, dysuria or SOB. In the ED, found to be hypotensive s/p 500 cc of IVF and started on Ceftriaxone. Hepatology called for further evaluation.     #Decompensated ETOH Cirrhosis, MELD NA 26 (driven by Sodium)  -EV: s/p banding on 2022  -Ascites: requiring weekly paracentesis, last on 4/10 with 8.3 L removed. Diagnostic tap on 4/13 negative for SBP  -Multifocal HCC+with two LIRAD 5 lesion in the Rt hepatic lobe, largest measuring 5.8 cm, pending mapping with IR for Y90 therapy  -HE: none    #AUSTIN stage 3  #Hyponatremia  #Shock, concerning for cardiogenic vs septic shock:  -tap negative for SBP, UA clean. pending further work up  #Nonischemic cardiomyopathy s/p AICD    Recommendations:  -Continue w antibiotics per ICU  -Follow up urine culture, CXR, blood culture  -Hold home BB in setting of AUSTIN  -Oral fluid restriction, IV albumin, hold diuretics for hyponatremia, no current indication for hypertonic saline  -NM bone scan  -CT chest  -Send AFP  -Obtain Liver US with doppler to rule out thrombus  -Given AUSTIN, would limit paracentesis to 2-3 L only and replete with Albumin with 1:1  -Consider EP consult for device interrogation given reported shocks on Friday  -Obtain TTE  -Treat hyperkalemia per medical team  -strict I/Os  -daily INR and CMP    Recommendations preliminary until signed by attending.

## 2023-04-14 NOTE — DIETITIAN INITIAL EVALUATION ADULT - EDUCATION DIETARY MODIFICATIONS
Discussed need for nutrition supplement and increased protein-energy needs with pt. RD provided education on ways to optimize protein-energy intake. RD suggested pt eat small frequent meals, save non-perishable food items for between mealtimes, drink nutrition supplement between meals, and snack on nutrient dense foods. Pt amenable to receiving nutrition supplement. Pt verbalized understanding./(2) meets goals/outcomes/verbalization

## 2023-04-14 NOTE — DIETITIAN NUTRITION RISK NOTIFICATION - TREATMENT: THE FOLLOWING DIET HAS BEEN RECOMMENDED
Diet, Easy to Chew:   Consistent Carbohydrate {Evening Snack} (CSTCHOSN)  1200mL Fluid Restriction ([id number]) (04-13-23 @ 16:59) [Active]

## 2023-04-14 NOTE — PROGRESS NOTE ADULT - PROBLEM SELECTOR PLAN 4
Pt. with metabolic acidosis in setting of AUSTIN. Continue with PO sodium bicarbonate 1300mg TID tabs. Most recent SCO2 remains low at 16. Monitor SCO2.    If any questions, please feel free to contact me     Damian Black  Nephrology Fellow  [facility] Pager: [phone].

## 2023-04-14 NOTE — PROGRESS NOTE ADULT - ATTENDING COMMENTS
sent from IR with hypotension 70s; ?syncopal in MICU BP drop 60s  decompensated liver cirrhosis  #AUSTIN - last cr april 6th 2023 1.39  HRS vs prerenal  beckett placed, nonoliguric  started albumin with downtrend in creatinine  hopeful this is more prerenal  continue albumin  #hypotension-on levo, octreotide, albumin; abx  #met acidosis- on bicarb tabs 1300mg po tid  monitor bicarb trend  #hyperkalemia -lokelma as needed; monitor k trend; diuretic to facilitate k excretion if needed  #hyponatremia-improving- monitor closely; avoid overcorrection ie more than 6meq in first 24 hrs  call if situation changes  d/w micu sent from IR with hypotension 70s; ?syncopal in MICU BP drop 60s  decompensated liver cirrhosis  #AUSTIN - last cr april 6th 2023 1.39  HRS vs prerenal  beckett placed, nonoliguric  started albumin with downtrend in creatinine; this is more suggestive of prerenal  continue albumin  #hypotension-on levo, octreotide, albumin; abx  #met acidosis- on bicarb tabs 1300mg po tid  monitor bicarb trend  #hyperkalemia -lokelma as needed; monitor k trend; diuretic to facilitate k excretion if needed  #hyponatremia-improving- monitor closely; avoid overcorrection ie more than 6meq in first 24 hrs  call if situation changes  d/w micu

## 2023-04-14 NOTE — DIETITIAN INITIAL EVALUATION ADULT - FLUID ACCUMULATION
1+ left foot; right foot. Ascites: requiring weekly paracentesis, last on 4/10 with 8.3 L removed. Diagnostic tap on 4/13 per hepatology

## 2023-04-14 NOTE — DIETITIAN INITIAL EVALUATION ADULT - ORAL INTAKE PTA/DIET HISTORY
Pt with good appetite however, pt with poor PO intake for 9-10 months secondary to early satiety suspect from fluid retention. Pt was following a low Na diet however, then was told needed more salt so started eating chips. Confirms NKFA. Denies Hx of chewing or swallowing issues. Pt took Vitamin D and B complex. Drank Glucerna when intake suboptimal.

## 2023-04-14 NOTE — DIETITIAN INITIAL EVALUATION ADULT - NSFNSADHERENCEPTAFT_GEN_A_CORE
Hx of T2DM; took metformin x2 daily. Checked BG x2 daily. A1C: 6.5 % (04-06-23 @ 10:18) indicates good glycemic control.

## 2023-04-14 NOTE — DIETITIAN INITIAL EVALUATION ADULT - SIGNS/SYMPTOMS
severe/moderate muscle + moderate fat wasting, intake <75% nutrient needs >1 mo decompensated cirrhosis

## 2023-04-14 NOTE — PROGRESS NOTE ADULT - ASSESSMENT
Assessment:    Plan:    ####Neuro####  =No active issues  -Neuro checks q 2 hrs and prn for changes  -activity as tolerated  -physical therapy consult when stable    ####Pulm####  =Utilizing Rm Air  -Supplemental O2 prn to maintain SPO2 > 92%  -Bronchodilators q 6 hrs prn for sob/wheezes  -HOB >/= 30 degree angle  -incentive spirometry 10x q 2 hrs  -POCUS slight diminished LVFx, RV slight less then LV, VTI of 13, IVC 1.66    ####CV####  =Hx NICM, s/p bivent AICD, recent episodes of NSVT, resolving hypovolemic vs septic shock  -Norepi gtt - titrate to MAP >/= 65 to 70 mmHg  -amiodarone load started in OSH (4/7/23-4/10/23)  -continue amiodarone 400 mg po qd  -AICD interrogation 4/13/23 - demonstrated recent multiple episodes Vtach with shocks, non since 4/6/23  -obtain MK to eval RVFx/LVx, progression of disease  -home med of carvedilol 25 mg q 12 hrs - resume when stable    ####GI/####  =decompensated EtOH cirrhosis, weekly LVP, HCC, eradicated esophageal varices s/p banding, improving transaminitis, resolving AUSTIN in setting of recent LVP   -paracentesis as needed  -    ####ID####  =    ####FEN/ENDO/HEME####  =EtOH cirrhosis, s/p esophageal banding,         Critical Care Time: 40minutes   Reviewing data, imaging, discussing with multidisciplinary team, not inclusive of procedures, discussing goals of care with family Assessment:    Plan:    ####Neuro####  =No active issues  -Neuro checks q 2 hrs and prn for changes  -activity as tolerated  -physical therapy consult when stable  -GOC - full code    ####Pulm####  =Utilizing Rm Air  -Supplemental O2 prn to maintain SPO2 > 92%  -Bronchodilators q 6 hrs prn for sob/wheezes  -HOB >/= 30 degree angle  -incentive spirometry 10x q 2 hrs  -POCUS slight diminished LVFx, RV slight less then LV, VTI of 13, IVC 1.66    ####CV####  =Hx NICM, s/p bivent AICD, recent episodes of NSVT, resolving hypovolemic vs septic shock  -Norepi gtt - titrate to MAP >/= 65 to 70 mmHg  -amiodarone load started in OSH (4/7/23-4/10/23)  -continue amiodarone 400 mg po qd  -AICD interrogation 4/13/23 - demonstrated recent multiple episodes Vtach with shocks, non since 4/6/23  -obtain MK to eval RVFx/LVx, progression of disease  -home med of carvedilol 25 mg q 12 hrs - resume when stable    ####GI/####  =decompensated EtOH cirrhosis, weekly LVP, HCC, eradicated esophageal varices s/p banding, improving transaminitis, resolving AUSTIN in setting of recent LVP   -paracentesis as needed  -renal following - currently no need for H.D. vs CRRT  -easy to chew, consistent carbohydrate/renal diet as tolerated  -strict I & O's - keep even    ####ID####  =    ####FEN/ENDO/HEME####  =EtOH cirrhosis, s/p esophageal banding,   -obtain CMP/Mg++/PO--4/CBC w diff/PT/PTT/INR now and q. a.m.  -abg prn  -Albumin 25%/100cc's IV q 6 hrs x 4      Critical Care Time: 40minutes   Reviewing data, imaging, discussing with multidisciplinary team, not inclusive of procedures, discussing goals of care with family Assessment:        Plan:    ####Neuro####  =No active issues  -Neuro checks q 2 hrs and prn for changes  -activity as tolerated  -physical therapy consult when stable  -GOC - full code  -MELDNa 26 (4/14/23) , Alexandro 9.08    ####Pulm####  =Utilizing Rm Air  -Supplemental O2 prn to maintain SPO2 > 92%  -Bronchodilators q 6 hrs prn for sob/wheezes  -HOB >/= 30 degree angle  -incentive spirometry 10x q 2 hrs  -POCUS slight diminished LVFx, RV slight less then LV, VTI of 13, IVC 1.66    ####CV####  =Hx NICM, s/p bivent AICD, recent episodes of NSVT, resolving hypovolemic vs septic shock  -Norepi gtt - titrate to MAP >/= 65 to 70 mmHg  -amiodarone load started in OSH (4/7/23-4/10/23)  -continue amiodarone 400 mg po qd  -AICD interrogation 4/13/23 - demonstrated recent multiple episodes Vtach with shocks, non since 4/6/23  -obtain MK to eval RVFx/LVx, progression of disease  -home med of carvedilol 25 mg q 12 hrs - resume when stable    ####GI/####  =decompensated EtOH cirrhosis, weekly LVP, HCC, eradicated esophageal varices s/p banding, improving transaminitis, resolving AUSTIN in setting of recent LVP   -paracentesis as needed  -renal following - currently no need for H.D. vs CRRT  -easy to chew, consistent carbohydrate/renal diet as tolerated  -strict I & O's - keep even  -continue octreotide gtt (50 mcg/hr)  -I.R. Y90 liver mapping when stable    ####ID####  =possible UTI, decompensated cirrhosis  -SARS-CoV-2 4/13/23 - ND  -Influ A/B 4/13/24 - ND  -RSV 4/13/23 - ND  -Peritoneal fluid 4/13/23 - gm stain - no organisms  -follow up cx results  -continue zosyn q 8 hrs x 7 days - started 4/13/23    ####FEN/ENDO/HEME####  =EtOH cirrhosis, s/p esophageal banding, DM2, improving HYPONa+, NAGMA  -obtain CMP/Mg++/PO--4/CBC w diff/PT/PTT/INR now and q. a.m.  -abg prn  -Albumin 25%/100cc's IV q 6 hrs x 4  -POC glucose with ISS qac and qhs - maintain glucose 140-180  -correct Na+ no greater than 6 to 8 meq per 24 hrs  -continue NaHCO3 tabs 1300 mg q 8 hrs     Assessment:      67 yr male with stated Hx significant for NICM, AICD, EtOH cirrhosis, eradicated esophageal varices s/p banding, Portal HTN, recurrent LVP, HCC, recent OSH admission for NSVT/AICD eval with subsequent LVP of 8.3 liters  who presented from IR with HYPOtn, AUSTIN requiring Norepi gtt.        Admitted to MICU (4/13/23) for decompensated cirrhosis, possible HRS-AUSTIN due to suspected hypovolemic vs septic shock (resolving)    Plan:    ####Neuro####  =No active issues  -Neuro checks q 2 hrs and prn for changes  -activity as tolerated  -physical therapy consult when stable  -[facility] - full code  -MELDNa 26 (4/14/23) , Alexandro 9.08    ####Pulm####  =Utilizing Rm Air  -Supplemental O2 prn to maintain SPO2 > 92%  -Bronchodilators q 6 hrs prn for sob/wheezes  -HOB >/= 30 degree angle  -incentive spirometry 10x q 2 hrs  -will Obtain non-con CT chest to eval for metastatic disease  -POCUS slight diminished LVFx, RV slight less then LV, VTI of 13, IVC 1.66    ####CV####  =Hx NICM, s/p bivent AICD, recent episodes of NSVT, resolving hypovolemic vs septic shock  -Norepi gtt - titrate to MAP >/= 65 to 70 mmHg  -amiodarone load started in OSH (4/7/23-4/10/23)  -continue amiodarone 400 mg po qd  -AICD interrogation 4/13/23 - demonstrated recent multiple episodes Vtach with shocks, non since 4/6/23  -obtain MK to eval RVFx/LVx, progression of disease  -home med of carvedilol 25 mg q 12 hrs - resume when stable    ####GI/####  =decompensated EtOH cirrhosis, weekly LVP, HCC, eradicated esophageal varices s/p banding, improving transaminitis, resolving AUSTIN in setting of recent LVP   -paracentesis as needed  -renal following - currently no need for H.D. vs CRRT  -easy to chew, consistent carbohydrate/renal diet as tolerated  -strict I & O's - keep even  -continue octreotide gtt (50 mcg/hr)  -I.R. Y90 liver mapping when stable    ####ID####  =possible UTI, decompensated cirrhosis  -SARS-CoV-2 4/13/23 - ND  -Influ A/B 4/13/24 - ND  -RSV 4/13/23 - ND  -Peritoneal fluid 4/13/23 - gm stain - no organisms  -follow up cx results  -continue zosyn q 8 hrs x 7 days - started 4/13/23    ####FEN/ENDO/HEME####  =EtOH cirrhosis, s/p esophageal banding, DM2, improving HYPONa+, NAGMA  -obtain CMP/Mg++/PO--4/CBC w diff/PT/PTT/INR now and q. a.m.  -abg prn  -Albumin 25%/100cc's IV q 6 hrs x 4  -POC glucose with ISS qac and qhs - maintain glucose 140-180  -correct Na+ no greater than 6 to 8 meq per 24 hrs  -continue NaHCO3 tabs 1300 mg q 8 hrs  -will obtain nuclear bone scan to eval for metastatic disease

## 2023-04-14 NOTE — DIETITIAN INITIAL EVALUATION ADULT - NS FNS DIET ORDER
Diet, Easy to Chew:   Consistent Carbohydrate {Evening Snack} (CSTCHOSN)  1200mL Fluid Restriction ([id number]) (04-13-23 @ 16:59)

## 2023-04-14 NOTE — PROGRESS NOTE ADULT - ATTENDING COMMENTS
66 yo M with hypertension, dyslipidemia, DM2, NICM (s/p AICD in 2014) complicated by recurrent VT (currently on amiodarone), and decompensated alcohol-associated cirrhosis complicated by refractory ascites, hyponatremia, non-bleeding EV (s/p EVL in 2022), and multifocal HCC, currently admitted to MICU with shock requiring norepinephrine with associated AUSTIN on CKD and acute on chronic hyponatremia.    Diagnostic paracentesis (4/13) with no SBP. Urinalysis (4/13) negative. Blood cultures (4/13 x2) pending. Currently on Zosyn (4/13- ) empirically. Based on POCUS per MICU, he also had signs of intravascular volume depletion possibly related to insufficient albumin replacement after his last outpatient LVP on Monday (4/10). Awaiting formal TTE to help rule out cardiogenic shock. Renal function gradually improving with IV albumin and BP support. Hyponatremia gradually improving with IV albumin and oral fluid restriction with no current indication for hypertonic saline.    He is mentating normally.    Liver function overall stable thus far with current MELD-Na 26 largely driven by his hyponatremia and AUSTIN.    In terms of his multifocal HCC, he had AFP 11,256 (2/2023) concerning for locally advanced or metastatic disease though this has not yet been confirmed. Multiphase CT abdomen (1/6/23) showed a 5.8 cm HCC (LR-5 lesion) in segment 5/6 and a 1.5 cm HCC (LR-5) lesion in segment 5, in addition to multiple indeterminate (LR-3) lesions up to 2.2 cm in size in the left lobe and up to 1.2 cm in size in the right lobe, with patent hepatic vasculature without evidence of macrovascular invasion. Appreciate MICU's assistance with completing his metastatic work-up today. Non-contrast CT chest (4/14) with a 2 mm RUL nodule; this is likely unchanged compared to 2-3 mm nodules noted on a prior 8/2022 CT per an outside report. NM bone scan (4/14) with no bone metastases.    Given no definite infiltrative tumor, macrovascular invasion, or metastases at least on imaging thus far, it is possible that he may be able to undergo downstaging with liver-directed therapy and, if his AFP improves to <500, eventually become a transplant candidate, though would certainly also need further input from Cardiology / EP re: control of his recurrent VT and whether that would preclude transplantation.    We will continue to follow. Please don't hesitate to call with any questions or concerns.    Maryan De Souza M.D., Ph.D.  Transplant Hepatology

## 2023-04-14 NOTE — DIETITIAN INITIAL EVALUATION ADULT - ETIOLOGY
inadequate PO intake suspect secondary to fluid retention  increased physiological demand for nutrients

## 2023-04-14 NOTE — PROGRESS NOTE ADULT - SUBJECTIVE AND OBJECTIVE BOX
Pt seen and examined at bedside. He underwent a diagnostic paracentesis yesterday.         Allergies    No Known Allergies    Intolerances      MEDICATIONS:  MEDICATIONS  (STANDING):  albumin human 25% IVPB 100 milliLiter(s) IV Intermittent every 6 hours  aMIOdarone    Tablet 400 milliGRAM(s) Oral daily  chlorhexidine 4% Liquid 1 Application(s) Topical <User Schedule>  dextrose 5%. 1000 milliLiter(s) (50 mL/Hr) IV Continuous <Continuous>  dextrose 5%. 1000 milliLiter(s) (100 mL/Hr) IV Continuous <Continuous>  dextrose 50% Injectable 25 Gram(s) IV Push once  dextrose 50% Injectable 12.5 Gram(s) IV Push once  dextrose 50% Injectable 25 Gram(s) IV Push once  glucagon  Injectable 1 milliGRAM(s) IntraMuscular once  heparin   Injectable 5000 Unit(s) SubCutaneous every 8 hours  insulin lispro (ADMELOG) corrective regimen sliding scale   SubCutaneous at bedtime  insulin lispro (ADMELOG) corrective regimen sliding scale   SubCutaneous three times a day before meals  norepinephrine Infusion 0.14 MICROgram(s)/kG/Min (18.8 mL/Hr) IV Continuous <Continuous>  octreotide  Infusion 50 MICROgram(s)/Hr (10 mL/Hr) IV Continuous <Continuous>  piperacillin/tazobactam IVPB.. 3.375 Gram(s) IV Intermittent every 8 hours  sodium bicarbonate 1300 milliGRAM(s) Oral three times a day    MEDICATIONS  (PRN):  dextrose Oral Gel 15 Gram(s) Oral once PRN Blood Glucose LESS THAN 70 milliGRAM(s)/deciliter    Vital Signs Last 24 Hrs  T(C): 36.4 (2023 08:00), Max: 36.5 (2023 00:00)  T(F): 97.6 (2023 08:00), Max: 97.7 (2023 00:00)  HR: 80 (2023 10:15) (60 - 80)  BP: 122/57 (2023 10:15) (62/35 - 137/60)  BP(mean): 82 (2023 10:15) (44 - 98)  RR: 19 (2023 10:15) (12 - 36)  SpO2: 100% (2023 10:15) (100% - 100%)    Parameters below as of 2023 20:00  Patient On (Oxygen Delivery Method): room air         @ :  -   @ 07:00  --------------------------------------------------------  IN: 1100.3 mL / OUT: 1720 mL / NET: -619.7 mL     @ 07: @ 10:40  --------------------------------------------------------  IN: 559.1 mL / OUT: 370 mL / NET: 189.1 mL      ROS:   All negative except as noted above       GENERAL:  NAD, Appears stated age  HEENT:  NC/AT,  conjunctivae clear and pink, sclera -anicteric  CHEST:  CTA B/L, Normal effort  HEART:  RRR S1/S2,  ABDOMEN:  Distended, nontender   EXTREMITIES:  No cyanosis or Edema  SKIN:  Warm & Dry. No rash or erythema  NEURO:  Alert, oriented, no focal deficit  LABS:                        10.2   6.37  )-----------( 161      ( 2023 00:12 )             29.2     04-14    122<L>  |  92<L>  |  79<H>  ----------------------------<  230<H>  5.3   |  16<L>  |  2.45<H>    Ca    8.2<L>      2023 00:12  Phos  4.1     04-14  Mg     2.0     -14    TPro  5.5<L>  /  Alb  2.6<L>  /  TBili  0.8  /  DBili  x   /  AST  69<H>  /  ALT  65<H>  /  AlkPhos  85  04-14    PT/INR - ( 2023 00:12 )   PT: 13.8 sec;   INR: 1.19 ratio         PTT - ( 2023 00:12 )  PTT:28.7 sec      Urinalysis Basic - ( 2023 11:55 )    Color: Yellow / Appearance: Slightly Turbid / S.022 / pH: x  Gluc: x / Ketone: Trace  / Bili: Small / Urobili: 2 mg/dL   Blood: x / Protein: 30 mg/dL / Nitrite: Negative   Leuk Esterase: Moderate / RBC: 3 /hpf / WBC 5 /HPF   Sq Epi: x / Non Sq Epi: x / Bacteria: Negative                Culture - Body Fluid with Gram Stain (collected 2023 11:08)  Source: Peritoneal Peritoneal Fluid  Gram Stain (2023 19:05):    polymorphonuclear leukocytes seen    Red blood cells seen    No organisms seen    by cytocentrifuge      RADIOLOGY & ADDITIONAL STUDIES: Pt seen and examined at bedside. He underwent a diagnostic paracentesis yesterday. Fatigued and has abdominal distension that is uncomfortable.    ROS: 14-point ROS reviewed and negative except as per HPI above.    Allergies    No Known Allergies    Intolerances      MEDICATIONS:  MEDICATIONS  (STANDING):  albumin human 25% IVPB 100 milliLiter(s) IV Intermittent every 6 hours  aMIOdarone    Tablet 400 milliGRAM(s) Oral daily  chlorhexidine 4% Liquid 1 Application(s) Topical <User Schedule>  dextrose 5%. 1000 milliLiter(s) (50 mL/Hr) IV Continuous <Continuous>  dextrose 5%. 1000 milliLiter(s) (100 mL/Hr) IV Continuous <Continuous>  dextrose 50% Injectable 25 Gram(s) IV Push once  dextrose 50% Injectable 12.5 Gram(s) IV Push once  dextrose 50% Injectable 25 Gram(s) IV Push once  glucagon  Injectable 1 milliGRAM(s) IntraMuscular once  heparin   Injectable 5000 Unit(s) SubCutaneous every 8 hours  insulin lispro (ADMELOG) corrective regimen sliding scale   SubCutaneous at bedtime  insulin lispro (ADMELOG) corrective regimen sliding scale   SubCutaneous three times a day before meals  norepinephrine Infusion 0.14 MICROgram(s)/kG/Min (18.8 mL/Hr) IV Continuous <Continuous>  octreotide  Infusion 50 MICROgram(s)/Hr (10 mL/Hr) IV Continuous <Continuous>  piperacillin/tazobactam IVPB.. 3.375 Gram(s) IV Intermittent every 8 hours  sodium bicarbonate 1300 milliGRAM(s) Oral three times a day    MEDICATIONS  (PRN):  dextrose Oral Gel 15 Gram(s) Oral once PRN Blood Glucose LESS THAN 70 milliGRAM(s)/deciliter    Vital Signs Last 24 Hrs  T(C): 36.4 (2023 08:00), Max: 36.5 (2023 00:00)  T(F): 97.6 (2023 08:00), Max: 97.7 (2023 00:00)  HR: 80 (2023 10:15) (60 - 80)  BP: 122/57 (2023 10:15) (62/35 - 137/60)  BP(mean): 82 (2023 10:15) (44 - 98)  RR: 19 (2023 10:15) (12 - 36)  SpO2: 100% (2023 10:15) (100% - 100%)    Parameters below as of 2023 20:00  Patient On (Oxygen Delivery Method): room air         @ 07:  -   @ 07:00  --------------------------------------------------------  IN: 1100.3 mL / OUT: 1720 mL / NET: -619.7 mL     @ 07: @ 10:40  --------------------------------------------------------  IN: 559.1 mL / OUT: 370 mL / NET: 189.1 mL      PHYSICAL EXAM:  Constitutional: Well-developed, +bitemporal wasting, +chronically ill appearing, in no acute distress, with normal voice and communication.  Eyes: Sclera anicteric, conjunctiva normal.  ENT: Oropharynx normal with moist mucous membranes and no thrush.  Cardiovascular: RRR, no JVD, +ICD in place.  Respiratory: Normal respiratory rhythm and effort, lungs CTAB.  Gastrointestinal: Normal bowel sounds, +markedly distended, tense, non-tender to palpation.  Extremities: No clubbing or cyanosis of the fingernails, no peripheral edema, +sarcopenia.  Skin: Normal skin turgor, no rash, no jaundice.  Neurologic: Alert, oriented to person, place, and date, no asterixis.      LABS:                        10.2   6.37  )-----------( 161      ( 2023 00:12 )             29.2     04-14    122<L>  |  92<L>  |  79<H>  ----------------------------<  230<H>  5.3   |  16<L>  |  2.45<H>    Ca    8.2<L>      2023 00:12  Phos  4.1     04-14  Mg     2.0     -14    TPro  5.5<L>  /  Alb  2.6<L>  /  TBili  0.8  /  DBili  x   /  AST  69<H>  /  ALT  65<H>  /  AlkPhos  85  04-14    PT/INR - ( 2023 00:12 )   PT: 13.8 sec;   INR: 1.19 ratio         PTT - ( 2023 00:12 )  PTT:28.7 sec      Urinalysis Basic - ( 2023 11:55 )    Color: Yellow / Appearance: Slightly Turbid / S.022 / pH: x  Gluc: x / Ketone: Trace  / Bili: Small / Urobili: 2 mg/dL   Blood: x / Protein: 30 mg/dL / Nitrite: Negative   Leuk Esterase: Moderate / RBC: 3 /hpf / WBC 5 /HPF   Sq Epi: x / Non Sq Epi: x / Bacteria: Negative                Culture - Body Fluid with Gram Stain (collected 2023 11:08)  Source: Peritoneal Peritoneal Fluid  Gram Stain (2023 19:05):    polymorphonuclear leukocytes seen    Red blood cells seen    No organisms seen    by cytocentrifuge      RADIOLOGY & ADDITIONAL STUDIES:

## 2023-04-14 NOTE — DIETITIAN INITIAL EVALUATION ADULT - OTHER INFO
Wt Hx:   Dosing wt 71.6 kG/157.8 lbs. Daily wt 158.2 lbs (4/14)  UBW ~195 lbs with unintentional wt loss PTA (last known wt unknown).  Ht: 72 inches    IBW: 178 lbs    IBW%: 89%  Wt Hx per HIE (lbs): 180 (7/22/19), 190 (4/5/21), 162 (3/18/23), 167 (4/6/23)  On paracentesis PTA thus wt fluctuations likely    Nutrition-Related Concerns:   - EtOH cirrhosis, decompensated with paracentesis as needed  - AUSTIN, resolving   --> Hyperkalemia. Last drawn WNL  - Hx of diabetes; elevated BG, on sliding scale of insulin

## 2023-04-14 NOTE — DIETITIAN INITIAL EVALUATION ADULT - PERTINENT LABORATORY DATA
04-14    122<L>  |  92<L>  |  79<H>  ----------------------------<  230<H>  5.3   |  16<L>  |  2.45<H>    Ca    8.2<L>      14 Apr 2023 00:12  Phos  4.1     04-14  Mg     2.0     04-14    TPro  5.5<L>  /  Alb  2.6<L>  /  TBili  0.8  /  DBili  x   /  AST  69<H>  /  ALT  65<H>  /  AlkPhos  85  04-14  POCT Blood Glucose.: 274 mg/dL (04-14-23 @ 11:35)  A1C with Estimated Average Glucose Result: 6.5 % (04-06-23 @ 10:18)

## 2023-04-14 NOTE — DIETITIAN INITIAL EVALUATION ADULT - ADD RECOMMEND
Malnutrition alert placed in chart. Continue to trend labs, weight, skin integrity, and intake. Reinforce nutrition education as able.

## 2023-04-14 NOTE — PROGRESS NOTE ADULT - PROBLEM SELECTOR PLAN 1
Pt with AUSTIN on CKD likely in setting of hemodynamics, notable hypotension as well as HRS physiology likely also playing a part. On review of [facility] HIE/[facility] pt noted to have a SCr of 1.39 on 4/6 and on admission elevated to 3.2, and improved to 2.45 today UA without proteinuria of hematuria and urine lytes concerning for low [place] of 7 suggesting hepatorenal syndrome. pt currently on IV vasopressors. Agree with albumin infusions at this time. Collins catheter in place, monitor UOP.   Labs reviewed. No acute indication for RRT at this time, however explained to patient that if renal function worsens then he may require. He understands and is in agreement; HD consent obtained and placed in the chart.   Monitor labs and urine output. Avoid nephrotoxins. Dose medications as per eGFR. Pt with AUSTIN on CKD likely in setting of hemodynamics, notable hypotension as well as HRS physiology likely also playing a part. [facility] noted to have a SCr of 1.39 on 4/6 and on admission elevated to 3.2, and improved to 2.45 today UA without proteinuria of hematuria and urine lytes concerning for low [place] of 7 suggesting hepatorenal syndrome vs prerenal. pt currently on IV vasopressors. Agree with albumin infusions at this time. Collins catheter in place, monitor UOP.   Labs reviewed. No acute indication for RRT at this time, however explained to patient that if renal function worsens then he may require. He understands and is in agreement; HD consent obtained and placed in the chart.   Monitor labs and urine output. Avoid nephrotoxins. Dose medications as per eGFR.

## 2023-04-15 LAB
AFP-TM SERPL-MCNC: HIGH NG/ML
ALBUMIN SERPL ELPH-MCNC: 3.3 G/DL — SIGNIFICANT CHANGE UP (ref 3.3–5)
ALP SERPL-CCNC: 72 U/L — SIGNIFICANT CHANGE UP (ref 40–120)
ALT FLD-CCNC: 49 U/L — HIGH (ref 10–45)
ANION GAP SERPL CALC-SCNC: 12 MMOL/L — SIGNIFICANT CHANGE UP (ref 5–17)
APTT BLD: 32.7 SEC — SIGNIFICANT CHANGE UP (ref 27.5–35.5)
AST SERPL-CCNC: 39 U/L — SIGNIFICANT CHANGE UP (ref 10–40)
BASOPHILS # BLD AUTO: 0.03 K/UL — SIGNIFICANT CHANGE UP (ref 0–0.2)
BASOPHILS NFR BLD AUTO: 0.7 % — SIGNIFICANT CHANGE UP (ref 0–2)
BILIRUB SERPL-MCNC: 0.8 MG/DL — SIGNIFICANT CHANGE UP (ref 0.2–1.2)
BILIRUB SERPL-MCNC: 0.8 MG/DL — SIGNIFICANT CHANGE UP (ref 0.2–1.2)
BUN SERPL-MCNC: 62 MG/DL — HIGH (ref 7–23)
CALCIUM SERPL-MCNC: 8.4 MG/DL — SIGNIFICANT CHANGE UP (ref 8.4–10.5)
CHLORIDE SERPL-SCNC: 96 MMOL/L — SIGNIFICANT CHANGE UP (ref 96–108)
CO2 SERPL-SCNC: 19 MMOL/L — LOW (ref 22–31)
CREAT SERPL-MCNC: 1.73 MG/DL — HIGH (ref 0.5–1.3)
CREAT SERPL-MCNC: 1.87 MG/DL — HIGH (ref 0.5–1.3)
EGFR: 39 ML/MIN/1.73M2 — LOW
EGFR: 43 ML/MIN/1.73M2 — LOW
EOSINOPHIL # BLD AUTO: 0.07 K/UL — SIGNIFICANT CHANGE UP (ref 0–0.5)
EOSINOPHIL NFR BLD AUTO: 1.7 % — SIGNIFICANT CHANGE UP (ref 0–6)
GAS PNL BLDV: SIGNIFICANT CHANGE UP
GLUCOSE BLDC GLUCOMTR-MCNC: 173 MG/DL — HIGH (ref 70–99)
GLUCOSE BLDC GLUCOMTR-MCNC: 193 MG/DL — HIGH (ref 70–99)
GLUCOSE BLDC GLUCOMTR-MCNC: 202 MG/DL — HIGH (ref 70–99)
GLUCOSE BLDC GLUCOMTR-MCNC: 232 MG/DL — HIGH (ref 70–99)
GLUCOSE SERPL-MCNC: 224 MG/DL — HIGH (ref 70–99)
HCT VFR BLD CALC: 27.9 % — LOW (ref 39–50)
HCV AB S/CO SERPL IA: 0.13 S/CO — SIGNIFICANT CHANGE UP (ref 0–0.99)
HCV AB SERPL-IMP: SIGNIFICANT CHANGE UP
HGB BLD-MCNC: 9.7 G/DL — LOW (ref 13–17)
IMM GRANULOCYTES NFR BLD AUTO: 1 % — HIGH (ref 0–0.9)
INR BLD: 1.31 RATIO — HIGH (ref 0.88–1.16)
INR BLD: 1.34 RATIO — HIGH (ref 0.88–1.16)
LYMPHOCYTES # BLD AUTO: 0.4 K/UL — LOW (ref 1–3.3)
LYMPHOCYTES # BLD AUTO: 9.8 % — LOW (ref 13–44)
MAGNESIUM SERPL-MCNC: 2 MG/DL — SIGNIFICANT CHANGE UP (ref 1.6–2.6)
MCHC RBC-ENTMCNC: 34.3 PG — HIGH (ref 27–34)
MCHC RBC-ENTMCNC: 34.8 GM/DL — SIGNIFICANT CHANGE UP (ref 32–36)
MCV RBC AUTO: 98.6 FL — SIGNIFICANT CHANGE UP (ref 80–100)
MELD SCORE WITH DIALYSIS: 29 POINTS — SIGNIFICANT CHANGE UP
MELD SCORE WITHOUT DIALYSIS: 23 POINTS — SIGNIFICANT CHANGE UP
MONOCYTES # BLD AUTO: 0.63 K/UL — SIGNIFICANT CHANGE UP (ref 0–0.9)
MONOCYTES NFR BLD AUTO: 15.4 % — HIGH (ref 2–14)
NEUTROPHILS # BLD AUTO: 2.91 K/UL — SIGNIFICANT CHANGE UP (ref 1.8–7.4)
NEUTROPHILS NFR BLD AUTO: 71.4 % — SIGNIFICANT CHANGE UP (ref 43–77)
NRBC # BLD: 0 /100 WBCS — SIGNIFICANT CHANGE UP (ref 0–0)
PHOSPHATE SERPL-MCNC: 3.4 MG/DL — SIGNIFICANT CHANGE UP (ref 2.5–4.5)
PLATELET # BLD AUTO: 125 K/UL — LOW (ref 150–400)
POTASSIUM SERPL-MCNC: 4.3 MMOL/L — SIGNIFICANT CHANGE UP (ref 3.5–5.3)
POTASSIUM SERPL-SCNC: 4.3 MMOL/L — SIGNIFICANT CHANGE UP (ref 3.5–5.3)
PROT SERPL-MCNC: 5.8 G/DL — LOW (ref 6–8.3)
PROTHROM AB SERPL-ACNC: 15.1 SEC — HIGH (ref 10.5–13.4)
PROTHROM AB SERPL-ACNC: 15.5 SEC — HIGH (ref 10.5–13.4)
RBC # BLD: 2.83 M/UL — LOW (ref 4.2–5.8)
RBC # FLD: 13.1 % — SIGNIFICANT CHANGE UP (ref 10.3–14.5)
SODIUM SERPL-SCNC: 127 MMOL/L — LOW (ref 135–145)
SODIUM SERPL-SCNC: 127 MMOL/L — LOW (ref 135–145)
WBC # BLD: 4.08 K/UL — SIGNIFICANT CHANGE UP (ref 3.8–10.5)
WBC # FLD AUTO: 4.08 K/UL — SIGNIFICANT CHANGE UP (ref 3.8–10.5)

## 2023-04-15 PROCEDURE — 99232 SBSQ HOSP IP/OBS MODERATE 35: CPT | Mod: 25

## 2023-04-15 PROCEDURE — 49082 ABD PARACENTESIS: CPT

## 2023-04-15 PROCEDURE — 99233 SBSQ HOSP IP/OBS HIGH 50: CPT | Mod: GC

## 2023-04-15 RX ORDER — FENTANYL CITRATE 50 UG/ML
12.5 INJECTION INTRAVENOUS ONCE
Refills: 0 | Status: DISCONTINUED | OUTPATIENT
Start: 2023-04-15 | End: 2023-04-15

## 2023-04-15 RX ORDER — LANOLIN ALCOHOL/MO/W.PET/CERES
6 CREAM (GRAM) TOPICAL AT BEDTIME
Refills: 0 | Status: DISCONTINUED | OUTPATIENT
Start: 2023-04-15 | End: 2023-04-19

## 2023-04-15 RX ORDER — MIDODRINE HYDROCHLORIDE 2.5 MG/1
5 TABLET ORAL EVERY 8 HOURS
Refills: 0 | Status: DISCONTINUED | OUTPATIENT
Start: 2023-04-15 | End: 2023-04-16

## 2023-04-15 RX ORDER — MIDODRINE HYDROCHLORIDE 2.5 MG/1
10 TABLET ORAL EVERY 8 HOURS
Refills: 0 | Status: DISCONTINUED | OUTPATIENT
Start: 2023-04-15 | End: 2023-04-15

## 2023-04-15 RX ORDER — INSULIN GLARGINE 100 [IU]/ML
8 INJECTION, SOLUTION SUBCUTANEOUS AT BEDTIME
Refills: 0 | Status: DISCONTINUED | OUTPATIENT
Start: 2023-04-15 | End: 2023-04-17

## 2023-04-15 RX ORDER — OCTREOTIDE ACETATE 200 UG/ML
200 INJECTION, SOLUTION INTRAVENOUS; SUBCUTANEOUS EVERY 8 HOURS
Refills: 0 | Status: DISCONTINUED | OUTPATIENT
Start: 2023-04-15 | End: 2023-04-17

## 2023-04-15 RX ORDER — AMIODARONE HYDROCHLORIDE 400 MG/1
200 TABLET ORAL DAILY
Refills: 0 | Status: DISCONTINUED | OUTPATIENT
Start: 2023-04-15 | End: 2023-04-19

## 2023-04-15 RX ORDER — ALBUMIN HUMAN 25 %
100 VIAL (ML) INTRAVENOUS ONCE
Refills: 0 | Status: COMPLETED | OUTPATIENT
Start: 2023-04-15 | End: 2023-04-15

## 2023-04-15 RX ADMIN — NYSTATIN CREAM 1 APPLICATION(S): 100000 CREAM TOPICAL at 05:37

## 2023-04-15 RX ADMIN — Medication 6 MILLIGRAM(S): at 22:33

## 2023-04-15 RX ADMIN — HEPARIN SODIUM 5000 UNIT(S): 5000 INJECTION INTRAVENOUS; SUBCUTANEOUS at 22:32

## 2023-04-15 RX ADMIN — LIDOCAINE 1 PATCH: 4 CREAM TOPICAL at 07:00

## 2023-04-15 RX ADMIN — FENTANYL CITRATE 12.5 MICROGRAM(S): 50 INJECTION INTRAVENOUS at 14:15

## 2023-04-15 RX ADMIN — Medication 50 MILLILITER(S): at 14:13

## 2023-04-15 RX ADMIN — Medication 1300 MILLIGRAM(S): at 22:32

## 2023-04-15 RX ADMIN — Medication 4: at 14:22

## 2023-04-15 RX ADMIN — HEPARIN SODIUM 5000 UNIT(S): 5000 INJECTION INTRAVENOUS; SUBCUTANEOUS at 14:22

## 2023-04-15 RX ADMIN — AMIODARONE HYDROCHLORIDE 200 MILLIGRAM(S): 400 TABLET ORAL at 17:40

## 2023-04-15 RX ADMIN — FENTANYL CITRATE 12.5 MICROGRAM(S): 50 INJECTION INTRAVENOUS at 14:01

## 2023-04-15 RX ADMIN — Medication 2: at 08:58

## 2023-04-15 RX ADMIN — Medication 50 MILLILITER(S): at 01:01

## 2023-04-15 RX ADMIN — PIPERACILLIN AND TAZOBACTAM 25 GRAM(S): 4; .5 INJECTION, POWDER, LYOPHILIZED, FOR SOLUTION INTRAVENOUS at 05:37

## 2023-04-15 RX ADMIN — NYSTATIN CREAM 1 APPLICATION(S): 100000 CREAM TOPICAL at 14:23

## 2023-04-15 RX ADMIN — LIDOCAINE 1 PATCH: 4 CREAM TOPICAL at 06:45

## 2023-04-15 RX ADMIN — Medication 1300 MILLIGRAM(S): at 05:36

## 2023-04-15 RX ADMIN — FENTANYL CITRATE 12.5 MICROGRAM(S): 50 INJECTION INTRAVENOUS at 13:05

## 2023-04-15 RX ADMIN — MIDODRINE HYDROCHLORIDE 10 MILLIGRAM(S): 2.5 TABLET ORAL at 05:36

## 2023-04-15 RX ADMIN — NYSTATIN CREAM 1 APPLICATION(S): 100000 CREAM TOPICAL at 22:33

## 2023-04-15 RX ADMIN — HEPARIN SODIUM 5000 UNIT(S): 5000 INJECTION INTRAVENOUS; SUBCUTANEOUS at 05:36

## 2023-04-15 RX ADMIN — FENTANYL CITRATE 12.5 MICROGRAM(S): 50 INJECTION INTRAVENOUS at 12:50

## 2023-04-15 RX ADMIN — INSULIN GLARGINE 8 UNIT(S): 100 INJECTION, SOLUTION SUBCUTANEOUS at 22:32

## 2023-04-15 RX ADMIN — FENTANYL CITRATE 12.5 MICROGRAM(S): 50 INJECTION INTRAVENOUS at 13:14

## 2023-04-15 RX ADMIN — FENTANYL CITRATE 12.5 MICROGRAM(S): 50 INJECTION INTRAVENOUS at 13:30

## 2023-04-15 RX ADMIN — OCTREOTIDE ACETATE 200 MICROGRAM(S): 200 INJECTION, SOLUTION INTRAVENOUS; SUBCUTANEOUS at 14:23

## 2023-04-15 RX ADMIN — CHLORHEXIDINE GLUCONATE 1 APPLICATION(S): 213 SOLUTION TOPICAL at 05:37

## 2023-04-15 RX ADMIN — OCTREOTIDE ACETATE 200 MICROGRAM(S): 200 INJECTION, SOLUTION INTRAVENOUS; SUBCUTANEOUS at 22:32

## 2023-04-15 RX ADMIN — Medication 2: at 17:40

## 2023-04-15 RX ADMIN — MIDODRINE HYDROCHLORIDE 5 MILLIGRAM(S): 2.5 TABLET ORAL at 22:32

## 2023-04-15 RX ADMIN — Medication 1300 MILLIGRAM(S): at 14:22

## 2023-04-15 NOTE — PHYSICAL THERAPY INITIAL EVALUATION ADULT - PERTINENT HX OF CURRENT PROBLEM, REHAB EVAL
67yM Hx HTN, HLD, NICM, AICD with new generator placement 12/2022, T2DM, esophageal varices s/p banding, portal HTN, decompensated ETOH induced hepatic cirrhosis, with recurrent ascites requiring ~weekly paracentesis (approx 6-7L), HCC p/w several days of weakness and dizziness, and sensation of "loss of equilibrium", but denies any falls or syncope. Pt reports was admitted at [facility] 4/7-4/10 for apparently "AICD shocks" and had a paracentesis on Monday. Hosp Course: 4/14 In the ED: Dx para done in ED. Placed on pressors; adm to MICU with decompensated cirrhosis, poss hepatorenal syndrome - AUSTIN; abd sono: Cirrhosis. Multiple hypoechoic liver lesions which were noted on previous   CT, Moderate to severe ascites. Patent hepatic vasculature with normal direction of flow; bone scan negative for osseous mets; CT chest 2mm RUL nodule;

## 2023-04-15 NOTE — PROGRESS NOTE ADULT - ASSESSMENT
Assessment:      67 yr male with stated Hx significant for NICM, AICD, EtOH cirrhosis, eradicated esophageal varices s/p banding, Portal HTN, recurrent LVP, HCC, recent OSH admission for NSVT/AICD eval with subsequent LVP of 8.3 liters  who presented from IR with HYPOtn, AUSTIN requiring Norepi gtt.        Admitted to MICU (4/13/23) for decompensated cirrhosis, possible HRS-AUSTIN due to suspected hypovolemic vs septic shock (resolving)    Plan:    Neuro: No neurological issues / c/o generalized weakness in the setting of ongoing chronic illness  -c/w neuro checks as per protocol - q 4H  -OOB to chair  -PT consult -pt c/o frequent falls at home  -fall risk precautions  -OT- pt with intentional tremors- add with ADL's  -Neuro checks q 2 hrs and prn for changes  -GOC - full code  -MELDNa 26 (4/14/23)- 26 today 4/15 , Alexandro 9.08    Pulm: no gas exchange / oxygenation issues, +c/o some SOB / MON in the setting enlarged abd w ascites  -on R/A- supplemental O2 as needed to maintain O2 sat > 92%  -aspiration precautions  -Bronchodilators q 6 hrs prn for sob/wheezes- available if needed  -HOB >/= 30 degree angle  -incentive spirometry 10x q hour while awake   -chest PT   - OOB     CV: Hx NICM s/p Bivent AICD, +recent hospitalization 4/13/23 OSH for NSVT had been started on Amiodarone- d/c;d (4/7/23-4/10/23)  admitted NS with hypovolemic shock in the setting of LVP 8 liters on 4/10  - Off of Norepi gtt - since yesterday am- d/c  -AICD interrogation 4/13/23 - demonstrated recent multiple episodes Vtach with shocks, non since 4/6/23  -obtain MK to eval RVFx/LVx, progression of disease  -home med of carvedilol 25 mg q 12 hrs - resume when stable  -on Midodrine - MAP > 80- decrease dose to 5mg q 8H from 10mg - multi purpose for weaning off of pressors and HRS    GI: a/w decompensated EtOH cirrhosis, recurrent ascites weekly LVP, HCC, eradicated esophageal varices s/p banding (8/30/22), portal HTN, Colon Ca w liver mets, planned Y90 - study 4/13 IR - procedure had been planned w IR for 4/23/23  -paracentesis as needed- last LVP on 4/10 8Liters, Plan for US today BS and possible paracentesis pending on volume   -On Octreotide gtt @ 50mg/Hr- d/c and start @ 200mcg q 8H SQ  -f/u Hepatology - plan for Y90 mapping / embolization  -d/c Miralax / bowel regimen- pt w multiple BM's 2/2 bowel regimen  -albumin as needed     Renal: AUSTIN on CRI- RF improving baseline 1.39 a/w 3.1- this am 1.87 -c/w ample u/o   -d/c Collins - tend u/o  -f/u Nephrology - HD / CRRT not needed  -c/w strict I/O  -c/w trending renal function- especially in the setting of HRS - d/c octreotide gtt changed to sq, and midodrine dose decreased to 5mg q8 from 10mg  -c/w NaHCO3 tabs for chronic metabolic acidosis - low serum bicarb    ID: Diagnostic paracentesis done 4/13/23- negative, BC x 2 negative - pt ruled out for septic shock - hypotension r/t hypovolemic shock   -SARS-CoV-2 4/13/23 - ND  -Influ A/B 4/13/24 - ND  -RSV 4/13/23 - ND  -Peritoneal fluid 4/13/23 - gm stain - no organisms  -d/c zosyn ( 4/13/23- 4/15/23) observe off abx    Endo: Hx DMII  -c/w FS q AC / HS  -c/w Lantus qhs  -obtain Hgb A1C   Detail Level: Detailed Assessment:      67 yr male with stated Hx significant for NICM, AICD, EtOH cirrhosis, eradicated esophageal varices s/p banding, Portal HTN, recurrent LVP, HCC, recent OSH admission for NSVT/AICD eval with subsequent LVP of 8.3 liters  who presented from IR with HYPOtn, AUSTIN requiring Norepi gtt.        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    Plan:    Neuro: No neurological issues / c/o generalized weakness in the setting of ongoing chronic illness  -c/w neuro checks as per protocol - q 4H  -OOB to chair  -PT consult -pt c/o frequent falls at home  -fall risk precautions  -OT- pt with intentional tremors- add with ADL's  -Neuro checks q 2 hrs and prn for changes  -GOC - full code  -MELDNa 26 (4/14/23)- 26 today 4/15 , Alexandro 9.08    Pulm: no gas exchange / oxygenation issues, +c/o some SOB / MON in the setting enlarged abd w ascites  -on R/A- supplemental O2 as needed to maintain O2 sat > 92%  -aspiration precautions  -Bronchodilators q 6 hrs prn for sob/wheezes- available if needed  -HOB >/= 30 degree angle  -incentive spirometry 10x q hour while awake   -chest PT   - OOB     CV: Hx NICM s/p Bivent AICD, +recent hospitalization 4/13/23 OSH for NSVT had been started on Amiodarone- d/c;d (4/7/23-4/10/23)  admitted NS with hypovolemic shock in the setting of LVP 8 liters on 4/10  - Off of Norepi gtt - since yesterday am- d/c  -AICD interrogation 4/13/23 - demonstrated recent multiple episodes Vtach with shocks, non since 4/6/23  -obtain MK to eval RVFx/LVx, progression of disease  -home med of carvedilol 25 mg q 12 hrs - resume when stable  -on Midodrine - MAP > 80- decrease dose to 5mg q 8H from 10mg - multi purpose for weaning off of pressors and HRS    GI: a/w decompensated EtOH cirrhosis, recurrent ascites weekly LVP, HCC, eradicated esophageal varices s/p banding (8/30/22), portal HTN, Colon Ca w liver mets, planned Y90 - study 4/13 IR - procedure had been planned w IR for 4/23/23  -paracentesis as needed- last LVP on 4/10 8Liters, Plan for US today BS and possible paracentesis pending on volume   -On Octreotide gtt @ 50mg/Hr- d/c and start @ 200mcg q 8H SQ  -f/u Hepatology - plan for Y90 mapping / embolization  -d/c Miralax / bowel regimen- pt w multiple BM's 2/2 bowel regimen  -albumin as needed     Renal: AUSTIN on CRI- RF improving baseline 1.39 a/w 3.1- this am 1.87 -c/w ample u/o   -d/c Collins - tend u/o  -f/u Nephrology - HD / CRRT not needed  -c/w strict I/O  -c/w trending renal function- especially in the setting of HRS - d/c octreotide gtt changed to sq, and midodrine dose decreased to 5mg q8 from 10mg  -c/w NaHCO3 tabs for chronic metabolic acidosis - low serum bicarb    ID: Diagnostic paracentesis done 4/13/23- negative, BC x 2 negative - pt ruled out for septic shock - hypotension r/t hypovolemic shock   -SARS-CoV-2 4/13/23 - ND  -Influ A/B 4/13/24 - ND  -RSV 4/13/23 - ND  -Peritoneal fluid 4/13/23 - gm stain - no organisms  -d/c zosyn ( 4/13/23- 4/15/23) observe off abx    Endo: Hx DMII  -c/w FS q AC / HS  -c/w Lantus qhs  -obtain Hgb A1C   Size Of Lesion In Cm (Optional): 0 Assessment:      67 yr male with stated Hx significant for NICM, AICD, EtOH cirrhosis, eradicated esophageal varices s/p banding, Portal HTN, recurrent LVP, HCC, recent OSH admission for NSVT/AICD eval with subsequent LVP of 8.3 liters  who presented from IR with HYPOtn, [place] requiring Norepi gtt.        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    Plan:    Neuro: No neurological issues / c/o generalized weakness in the setting of ongoing chronic illness  -c/w neuro checks as per protocol - q 4H  -OOB to chair  -PT consult -pt c/o frequent falls at home  -fall risk precautions  -OT- pt with intentional tremors- add with ADL's  -GOC - full code      Pulm: no gas exchange / oxygenation issues, +c/o some SOB / MON in the setting enlarged abd w ascites  -on R/A- supplemental O2 as needed to maintain O2 sat > 92%  -aspiration precautions  -Bronchodilators q 6 hrs prn for sob/wheezes- available if needed  -HOB >/= 30 degree angle  -incentive spirometry 10x q hour while awake   -chest PT   - OOB     CV: Hx NICM s/p Bivent AICD, +recent hospitalization 4/13/23 OSH for NSVT had been started on Amiodarone- d/c;d (4/7/23-4/10/23)  admitted NS with hypovolemic shock in the setting of LVP 8 liters on 4/10  - Off of Norepi gtt - since yesterday am-(4/14)  -AICD interrogation 4/13/23 - demonstrated recent multiple episodes Vtach with shocks, non since 4/6/23  -obtain MK to eval RVFx/LVx, progression of disease  -home med of carvedilol 25 mg q 12 hrs - resume when stable  -on Midodrine - MAP > 80- decrease dose to 5mg q 8H from 10mg - multi purpose for weaning off of pressors and HRS  -restart amiodarone 200mg qd- as per EPS    GI: a/w decompensated EtOH cirrhosis, recurrent ascites weekly LVP, HCC, eradicated esophageal varices s/p banding (8/30/22), portal HTN, Colon Ca w liver mets, planned Y90 - study 4/13 IR - procedure had been planned w IR for 4/23/23  -MELDNa 26 (4/14/23)- 26 today 4/15 , Alexandro 9.08  -paracentesis as needed- last LVP on 4/10 8Liters, BS / MICU US today 4/15  paracentesis done - 4L removed / albumin given afterwards  -On Octreotide gtt @ 50mg/Hr- d/c and start @ 200mcg q 8H SQ  -f/u Hepatology - plan for Y90 mapping / embolization  -d/c Miralax / bowel regimen- pt w multiple BM's 2/2 bowel regimen  -albumin as needed   -f/u by Hepatology along with IR    Renal: AUSTIN on CRI- RF improving baseline 1.39 a/w 3.1- this am 1.87 -c/w ample u/o   -d/c Collins - tend u/o  -f/u Nephrology - HD / CRRT not needed  -c/w strict I/O  -c/w trending renal function- especially in the setting of HRS - d/c octreotide gtt changed to sq, and midodrine dose decreased to 5mg q8 from 10mg  -c/w NaHCO3 tabs for chronic metabolic acidosis - low serum bicarb    ID: Diagnostic paracentesis done 4/13/23- negative, BC x 2 negative - pt ruled out for septic shock - hypotension r/t hypovolemic shock   -SARS-CoV-2 4/13/23 - ND  -Influ A/B 4/13/24 - ND  -RSV 4/13/23 - ND  -Peritoneal fluid 4/13/23 - gm stain - no organisms  -d/c zosyn ( 4/13/23- 4/15/23) observe off abx    Endo: Hx DMII  -c/w FS q AC / HS  -c/w Lantus qhs  -obtain Hgb A1C

## 2023-04-15 NOTE — OCCUPATIONAL THERAPY INITIAL EVALUATION ADULT - LIVES WITH, PROFILE
Lives alone in an apartment that is owned by his friend, friends assist as needed and help drive him

## 2023-04-15 NOTE — PROGRESS NOTE ADULT - SUBJECTIVE AND OBJECTIVE BOX
[facility] DIVISION OF KIDNEY DISEASES AND HYPERTENSION -- FOLLOW UP NOTE  --------------------------------------------------------------------------------  67 year old male with PMH of HTN, HLD, DM, ETOH liver cirrhosis complicated by esophageal varices s/p banding, portal HTN, HCC, and CKD who presented to the hospital with several days of weakness and unsteadiness. The nephrology team was consulted for AUSTIN on CKD and hypotension. [facility] noted to have a SCr of 1.39 on 4/6 and on admission elevated to 3.2 and now improved to 2.45    24 hour events/subjective:  The patient was seen and examined earlier today in the MICU. Reports feeling okay but with diarrhea overnight. Denied any chest pain or shortness of breath. Does admit to increased abdominal distension.       PAST HISTORY  --------------------------------------------------------------------------------  No significant changes to PMH, PSH, FHx, SHx, unless otherwise noted    ALLERGIES & MEDICATIONS  --------------------------------------------------------------------------------  Allergies    No Known Allergies    Intolerances      Standing Inpatient Medications  chlorhexidine 4% Liquid 1 Application(s) Topical <User Schedule>  dextrose 5%. 1000 milliLiter(s) IV Continuous <Continuous>  dextrose 5%. 1000 milliLiter(s) IV Continuous <Continuous>  dextrose 50% Injectable 25 Gram(s) IV Push once  dextrose 50% Injectable 12.5 Gram(s) IV Push once  dextrose 50% Injectable 25 Gram(s) IV Push once  glucagon  Injectable 1 milliGRAM(s) IntraMuscular once  heparin   Injectable 5000 Unit(s) SubCutaneous every 8 hours  insulin glargine Injectable (LANTUS) 5 Unit(s) SubCutaneous at bedtime  insulin lispro (ADMELOG) corrective regimen sliding scale   SubCutaneous three times a day before meals  insulin lispro (ADMELOG) corrective regimen sliding scale   SubCutaneous at bedtime  lidocaine   4% Patch 1 Patch Transdermal every 24 hours  midodrine 10 milliGRAM(s) Oral every 8 hours  norepinephrine Infusion 0.14 MICROgram(s)/kG/Min IV Continuous <Continuous>  nystatin Powder 1 Application(s) Topical every 8 hours  octreotide  Infusion 50 MICROgram(s)/Hr IV Continuous <Continuous>  piperacillin/tazobactam IVPB.. 3.375 Gram(s) IV Intermittent every 8 hours  polyethylene glycol 3350 17 Gram(s) Oral daily  sodium bicarbonate 1300 milliGRAM(s) Oral three times a day    PRN Inpatient Medications  dextrose Oral Gel 15 Gram(s) Oral once PRN      REVIEW OF SYSTEMS    All other systems were reviewed and are negative, except as noted.    VITALS/PHYSICAL EXAM  --------------------------------------------------------------------------------  T(C): 36.3 (04-15-23 @ 00:00), Max: 36.8 (04-14-23 @ 16:00)  HR: 80 (04-15-23 @ 01:00) (80 - 224)  BP: 124/66 (04-15-23 @ 01:00) (109/60 - 144/67)  RR: 19 (04-15-23 @ 01:00) (14 - 36)  SpO2: 95% (04-15-23 @ 01:00) (90% - 100%)  Wt(kg): --  Height (cm): 182.9 (04-13-23 @ 13:17)  Weight (kg): 71.6 (04-13-23 @ 13:17)  BMI (kg/m2): 21.4 (04-13-23 @ 13:17)  BSA (m2): 1.93 (04-13-23 @ 13:17)      04-13-23 @ 07:01  -  04-14-23 @ 07:00  --------------------------------------------------------  IN: 1100.3 mL / OUT: 1720 mL / NET: -619.7 mL    04-14-23 @ 07:01  -  04-15-23 @ 05:54  --------------------------------------------------------  IN: 1465.8 mL / OUT: 1840 mL / NET: -374.2 mL        Physical Exam:  	Gen: frail  	HEENT: MMM  	Pulm: CTA B/L  	CV: S1S2  	Abd: distended  	Ext: pitting LE edema B/L  	Neuro: Awake  	Skin: Warm and dry    LABS/STUDIES  --------------------------------------------------------------------------------              9.7    4.08  >-----------<  125      [04-15-23 @ 00:18]              27.9     127  |  x   |  x   ----------------------------<  x       [04-15-23 @ 02:39]  x    |  x   |  1.73        Ca     8.4     [04-15-23 @ 00:18]      Mg     2.0     [04-15-23 @ 00:18]      Phos  3.4     [04-15-23 @ 00:18]    TPro  x   /  Alb  x   /  TBili  0.8  /  DBili  x   /  AST  x   /  ALT  x   /  AlkPhos  x   [04-15-23 @ 02:39]    PT/INR: PT 15.5 , INR 1.34       [04-15-23 @ 02:39]  PTT: 32.7       [04-15-23 @ 00:18]      Creatinine Trend:  SCr 1.73 [04-15 @ 02:39]  SCr 1.87 [04-15 @ 00:18]  SCr 2.45 [04-14 @ 00:12]  SCr 2.93 [04-13 @ 16:18]  SCr 3.06 [04-13 @ 12:31]    Urinalysis - [04-13-23 @ 11:55]      Color Yellow / Appearance Slightly Turbid / SG 1.022 / pH 5.5      Gluc Negative / Ketone Trace  / Bili Small / Urobili 2 mg/dL       Blood Negative / Protein 30 mg/dL / Leuk Est Moderate / Nitrite Negative      RBC 3 / WBC 5 / Hyaline 15 / Gran  / Sq Epi  / Non Sq Epi  / Bacteria Negative    Urine Creatinine 265      [04-13-23 @ 11:56]  Urine Protein 50      [04-13-23 @ 11:56]  Urine Sodium 7      [04-13-23 @ 11:56]  Urine Urea Nitrogen 304      [04-13-23 @ 11:56]  Urine Potassium 52      [04-13-23 @ 11:56]  Urine Osmolality 333      [04-13-23 @ 11:56]    HbA1c 6.5      [03-11-19 @ 11:55]

## 2023-04-15 NOTE — PROGRESS NOTE ADULT - PROBLEM SELECTOR PLAN 4
Pt. with metabolic acidosis in setting of AUSTIN. Continue with PO sodium bicarbonate 1300mg TID tabs. Most recent SCO2 remains low at 19. Monitor SCO2.    If any questions, please feel free to contact me     Damian Black  Nephrology Fellow  [facility] Pager: [phone].

## 2023-04-15 NOTE — OCCUPATIONAL THERAPY INITIAL EVALUATION ADULT - PERTINENT HX OF CURRENT PROBLEM, REHAB EVAL
68 yo M with hypertension, dyslipidemia, DM2, NICM (s/p AICD in 2014) complicated by recurrent VT (currently on amiodarone), and decompensated alcohol-associated cirrhosis complicated by refractory ascites, hyponatremia, non-bleeding EV (s/p EVL in 2022), and multifocal HCC, currently admitted to MICU with shock requiring norepinephrine with associated AUSTIN on CKD and acute on chronic hyponatremia.  Diagnostic paracentesis (4/13) with no SBP. Urinalysis (4/13) negative. Blood cultures (4/13 x2) pending. Currently on Zosyn (4/13- ) empirically. Based on POCUS per MICU, he also had signs of intravascular volume depletion possibly related to insufficient albumin replacement after his last outpatient LVP on Monday (4/10). Awaiting formal TTE to help rule out cardiogenic shock. Renal function gradually improving with IV albumin and BP support. Hyponatremia gradually improving with IV albumin and oral fluid restriction with no current indication for hypertonic saline.  Liver function overall stable thus far with current MELD-Na 26 largely driven by his hyponatremia and AUSTIN.  In terms of his multifocal HCC, he had AFP 11,256 (2/2023) concerning for locally advanced or metastatic disease though this has not yet been confirmed. Multiphase CT abdomen (1/6/23) showed a 5.8 cm HCC (LR-5 lesion) in segment 5/6 and a 1.5 cm HCC (LR-5) lesion in segment 5, in addition to multiple indeterminate (LR-3) lesions up to 2.2 cm in size in the left lobe and up to 1.2 cm in size in the right lobe, with patent hepatic vasculature without evidence of macrovascular invasion. Non-contrast CT chest (4/14) with a 2 mm RUL nodule; this is likely unchanged compared to 2-3 mm nodules noted on a prior 8/2022 CT per an outside report. NM bone scan (4/14) with no bone metastases. Given no definite infiltrative tumor, macrovascular invasion, or metastases at least on imaging thus far, it is possible that he may be able to undergo downstaging with liver-directed therapy and, if his AFP improves to <500, eventually become a transplant candidate, though would certainly also need further input from Cardiology / EP re: control of his recurrent VT and whether that would preclude transplantation.

## 2023-04-15 NOTE — PROGRESS NOTE ADULT - ATTENDING COMMENTS
AUSTIN on CKD; ATN  Cirrhosis  Good urine output  On midodrine and levo  Continue bicarb tab  Trend Cr     Diamond Gonzalez MD  Off: [phone]  contact me on teams    (After 5 pm or on weekends please page the on-call fellow/attending, can check [email or web address] for schedule. Login is tanisha escalante, schedule under [facility] medicine, psych, derm)

## 2023-04-15 NOTE — PROGRESS NOTE ADULT - NS ATTEND AMEND GEN_ALL_CORE FT
Decompensated ETOH cirrhosis c/b HCC, hypotension likely secondary to hypovolemia, AUSTIN on CKD    - Wean midodrine as tolerated, goal MAP >= 55  - Trend Cr, avoid nephrotoxins, octreotide as per renal  - Free water restriction for hyponatremia  - Monitor off abx  - DVT prophylaxis  - Downgrade to medicine 66 y/o M w/HFrEF 2/2 NICM s/p AICD, decompensated ETOH cirrhosis c/b HCC admitted for ICD eval s/p LVP c/b hypotension likely secondary to hypovolemia, AUSTIN on CKD    - Wean midodrine as tolerated, goal MAP >= 55  - Trend Cr, avoid nephrotoxins, octreotide as per renal  - Free water restriction for hyponatremia  - Possible LVP  - Monitor off abx  - DVT prophylaxis  - Downgrade to medicine

## 2023-04-15 NOTE — PHYSICAL THERAPY INITIAL EVALUATION ADULT - ADDITIONAL COMMENTS
Resides with friend in private home. No steps to enter Lives alone in an apartment that is owned by his friend, friends assist as needed and help drive him

## 2023-04-15 NOTE — CHART NOTE - NSCHARTNOTEFT_GEN_A_CORE
MICU Transfer Note    Transfer from: MICU     ) Medicine    (  ) Telemetry     (   ) RCU        (    ) Palliative         (   ) Stroke Unit          (   ) __________________    Accepting physician: Dr. BOB Milan      MICU COURSE:  HPI:      67 yr old male with PMHx NICM (HFimpEF 50%) s/p [employer] biventricular AICD (2014, new generator 12/2022), HLD, DM2, EtOH cirrhosis (last drink spring 2022), Portal HTN, esophageal varices s/p banding with eventual eradication ([facility] health report 8/30/22), recurrent ascites requiring weekly LVP (~6-7 liters) with most recent LVP 4/10/23 with removal of 8.3 liters, Colon Ca (dx ~8/2022) with ? liver mets, HCC, (canceled [facility] living donor liver transplant due to insurance issues), recent PST and eval with hepatology (Dr. Loza) 4/2023 for planned Y90 (Yttrium-90) liver embolization/mapping/shunt study on 4/13/23 (Dr. Baron -I.R.), recent hospitalization at [facility] 4/7//23-4/10/23 for suspected NSVT and AICD evaluation tx with amiodarone therapy and subsequent LVP (as above). AICD interrogated 4/13/23 demonstrated episodes of Vtach without shocks, no episodes since 4/6/23.      Patient Presented this hospitalization 4/13/23 after being found to be HYPOtn with SBP 70's/40's while being evaluated by I.R. for planned Y90 liver mapping, Pt in addition endorsed weakness, lightheadedness, dizziness over 3 day period post recent LVP. In E.D. (4/13/23) found to have HYPOtn with SBP 73/47, HYPOnatremic of 118, sCr 3.12 (baseline 1.39 4/6/23) requiring Norepi gtt. Admitted to MICU (4/13/23) for decompensated cirrhosis, possible HRS-[place] due to suspected hypovolemic shock. The pt has been off of IV pressors since early 4/14, BC x 2 along with diagnostic paracentesis were negative for infection, Sodium levels, LFT's, and renal function has improved. A Paracentesis was done on 4/15/23- 4L of clear straw colored fluid removed, albumin 25%/100 administered afterwards, pt remained hemodynamically stable, fentanyl was given during the paracentesis procedure.        Vital Signs Last 24 Hrs  T(C): 36.6 (15 Apr 2023 12:00), Max: 36.8 (14 Apr 2023 16:00)  T(F): 97.8 (15 Apr 2023 12:00), Max: 98.3 (14 Apr 2023 16:00)  HR: 80 (15 Apr 2023 13:48) (80 - 106)  BP: 138/70 (15 Apr 2023 13:48) (114/65 - 153/70)  BP(mean): 96 (15 Apr 2023 13:48) (81 - 101)  RR: 17 (15 Apr 2023 13:48) (15 - 34)  SpO2: 99% (15 Apr 2023 13:48) (95% - 100%)    Parameters below as of 15 Apr 2023 08:00  Patient On (Oxygen Delivery Method): nasal cannula      I&O's Summary    14 Apr 2023 07:01  -  15 Apr 2023 07:00  --------------------------------------------------------  IN: 1529.8 mL / OUT: 2560 mL / NET: -1030.2 mL    15 Apr 2023 07:01  -  15 Apr 2023 14:46  --------------------------------------------------------  IN: 40 mL / OUT: 225 mL / NET: -185 mL        MEDICATIONS  (STANDING):  aMIOdarone    Tablet 200 milliGRAM(s) Oral daily  chlorhexidine 4% Liquid 1 Application(s) Topical <User Schedule>  dextrose 5%. 1000 milliLiter(s) (50 mL/Hr) IV Continuous <Continuous>  dextrose 5%. 1000 milliLiter(s) (100 mL/Hr) IV Continuous <Continuous>  dextrose 50% Injectable 12.5 Gram(s) IV Push once  dextrose 50% Injectable 25 Gram(s) IV Push once  dextrose 50% Injectable 25 Gram(s) IV Push once  glucagon  Injectable 1 milliGRAM(s) IntraMuscular once  heparin   Injectable 5000 Unit(s) SubCutaneous every 8 hours  insulin glargine Injectable (LANTUS) 8 Unit(s) SubCutaneous at bedtime  insulin lispro (ADMELOG) corrective regimen sliding scale   SubCutaneous three times a day before meals  insulin lispro (ADMELOG) corrective regimen sliding scale   SubCutaneous at bedtime  lidocaine   4% Patch 1 Patch Transdermal every 24 hours  midodrine 5 milliGRAM(s) Oral every 8 hours  nystatin Powder 1 Application(s) Topical every 8 hours  octreotide  Injectable 200 MICROGram(s) SubCutaneous every 8 hours  sodium bicarbonate 1300 milliGRAM(s) Oral three times a day    MEDICATIONS  (PRN):  dextrose Oral Gel 15 Gram(s) Oral once PRN Blood Glucose LESS THAN 70 milliGRAM(s)/deciliter      LABS                                            9.7                   Neurophils% (auto):   71.4   (04-15 @ 00:18):    4.08 )-----------(125          Lymphocytes% (auto):  9.8                                           27.9                   Eosinphils% (auto):   1.7      Manual%: Neutrophils x    ; Lymphocytes x    ; Eosinophils x    ; Bands%: x    ; Blasts x                                    127    |  x      |  x                   Calcium: x     / iCa: x      (04-15 @ 02:39)    ----------------------------<  x         Magnesium: x                                x       |  x      |  1.73             Phosphorous: x        TPro  x      /  Alb  x      /  TBili  0.8    /  DBili  x      /  AST  x      /  ALT  x      /  AlkPhos  x      15 Apr 2023 02:39    ( 04-15 @ 02:39 )   PT: 15.5 sec;   INR: 1.34 ratio  aPTT: x      Culture - Blood (04.13.23 @ 09:00)   Specimen Source: .Blood Blood-Peripheral  Culture Results:   No growth to date.Culture - Body Fluid with Gram Stain (04.13.23 @ 11:08)   Gram Stain:   polymorphonuclear leukocytes seen   Red blood cells seen   No organisms seen   by cytocentrifuge  Specimen Source: Peritoneal Peritoneal Fluid  Culture Results:   No growth to date.Culture - Fungal, Body Fluid (04.13.23 @ 11:08)   Specimen Source: Peritoneal Peritoneal Fluid  Culture Results:   Testing in progress    RADIOLOGY:  < from: CT Chest No Cont (04.14.23 @ 14:51) >  PROCEDURE DATE:  04/14/2023    INTERPRETATION:  CLINICAL INFORMATION: Liver transplant evaluation.   Evaluate for metastatic disease.  COMPARISON: CT abdomen pelvis 1/6/2023  CONTRAST/COMPLICATIONS:  IV Contrast: NONE  Oral Contrast: NONE  Complications: None reported at time of study completion    PROCEDURE:  CT of the Chest was performed.  Sagittal and coronal reformats were performed.    FINDINGS: The quality of the images are degraded by streak artifacts.    LUNGS AND AIRWAYS: Patent central airways.  2 mm anterior right upper   lobe nodule (series 5 image 52). The lungs are otherwise clear.  PLEURA: No pleural effusion.  MEDIASTINUM AND TERESE: No lymphadenopathy.  VESSELS: Left anterior chest wall defibrillator with leads in the right   atrium, right ventricle, and left ventricle.  HEART: Heart size is normal. No pericardial effusion.  CHEST WALL AND LOWER NECK: Within normal limits.  VISUALIZED UPPER ABDOMEN: Moderate volume ascites. Cirrhotic liver.   Ill-defined hepatic lesions.  BONES: Degenerative changes.    IMPRESSION:  Nonspecific 2 mm right upper lobe nodule. Recommend chest CT in 3 months   to determine stability.    < from: NM SPECT/CT Bone, Single Area Single Day (04.14.23 @ 14:06) >  ACC: [id number] EXAM:  NM BONE SPECT CT SA SD   ORDERED BY: HI FENTON   ACC: [id number] EXAM:  NM BONE IMG WHOLE BODY   ORDERED BY: HI FENTON   PROCEDURE DATE:  04/14/2023    INTERPRETATION:  CLINICAL INFORMATION: Patient with cirrhosis, HCC,   portal hypertension. Staging scan pretransplant evaluation.  RADIOPHARMACEUTICAL: 19 mCi Tc-99m MDP, I.V.    TECHNIQUE: Anterior and posterior whole body images were obtained 2-3   hours following administration of radiopharmaceutical.  CHEST  SPECT/CT   was obtained immediately thereafter. The CT protocol was optimized for   attenuation correction and to provide anatomic detail for localization of   SPECT abnormalities. The CT protocol was not designed to produce and   cannot replace state-of-the-art diagnostic CT images with specific   imaging protocols for different body parts and indications.    COMPARISON: None  OTHER STUDIES USED FOR CORRELATION: None    FINDINGS:  Heterogeneous nonspecific uptake is noted along the thoracolumbar spine   likely degenerative in nature. Degenerative changes are noted in both   shoulders right more than left, right sternoclavicular joint, manubrium,   and both knees.    Distinct focus of uptake is noted along the left transverse process of T9   likely injury related.    Left-sided cardiac pacemaker. Cirrhosis. Abdominal ascites. Multiple   lesions are noted in both hepatic lobes.    Both kidneys are visualized.    IMPRESSION: No definite scintigraphic evidence of osseous metastasis.      < from: US Abdomen Doppler (04.14.23 @ 12:30) >  ACC: [id number] EXAM:  US DPLX ABDOMEN   ORDERED BY: AIMEE SKINNER   PROCEDURE DATE:  04/14/2023    INTERPRETATION:  CLINICAL INFORMATION: Cirrhosis.  COMPARISON: CT abdomen pelvis 1/6/2023.  TECHNIQUE: Duplex ultrasound of the abdomen was performed to evaluate the   hepatic vasculature utilizing color and spectral Doppler.    FINDINGS:    Cirrhosis. There is a 6.9 x 4.5 x 5.0 cm hypoechoic mass in the right   hepatic lobe. Additional 1.6 x 1.5 x 1.6 cm hypoechoic lesion in the   right hepatic lobe was visualized.    Moderate to severe volume ascites.    The main, right, and left portal veins are patent with normal direction   of flow. The visualized splenic vein is patent with normal direction of   flow. The hepatic artery is patentwith peak systolic velocity of 63   cm/s. The hepatic veins and IVC are patent with phasic waveforms.    IMPRESSION:  Cirrhosis. Multiple hypoechoic liver lesions which were noted on previous   CT.    Moderate to severe ascites.    Patent hepatic vasculature with normal direction of flow.    ECHO: Pending          ASSESSMENT & PLAN:             For Followup: MICU Transfer Note    Transfer from: MICU     ) Medicine    (  ) Telemetry     (   ) RCU        (    ) Palliative         (   ) Stroke Unit          (   ) __________________    Accepting physician: Dr. BOB Milan      MICU COURSE:  HPI:      67 yr old male with PMHx NICM (HFimpEF 50%) s/p [employer] biventricular AICD (2014, new generator 12/2022), HLD, DM2, EtOH cirrhosis (last drink spring 2022), Portal HTN, esophageal varices s/p banding with eventual eradication ([facility] health report 8/30/22), recurrent ascites requiring weekly LVP (~6-7 liters) with most recent LVP 4/10/23 with removal of 8.3 liters, Colon Ca (dx ~8/2022) with ? liver mets, HCC, (canceled [facility] living donor liver transplant due to insurance issues), recent PST and eval with hepatology (Dr. Loza) 4/2023 for planned Y90 (Yttrium-90) liver embolization/mapping/shunt study on 4/13/23 (Dr. Baron -I.R.), recent hospitalization at [facility] 4/7//23-4/10/23 for suspected NSVT and AICD evaluation tx with amiodarone therapy and subsequent LVP (as above). AICD interrogated 4/13/23 demonstrated episodes of Vtach without shocks, no episodes since 4/6/23.      Patient Presented this hospitalization 4/13/23 after being found to be HYPOtn with SBP 70's/40's while being evaluated by I.R. for planned Y90 liver mapping, Pt in addition endorsed weakness, lightheadedness, dizziness over 3 day period post recent LVP. In E.D. (4/13/23) found to have HYPOtn with SBP 73/47, HYPOnatremic of 118, sCr 3.12 (baseline 1.39 4/6/23) requiring Norepi gtt. Admitted to MICU (4/13/23) for decompensated cirrhosis, possible HRS-[place] due to suspected hypovolemic shock. The pt has been off of IV pressors since early 4/14, BC x 2 along with diagnostic paracentesis were negative for infection, Sodium levels, LFT's, and renal function has improved. A Paracentesis was done on 4/15/23- 4L of clear straw colored fluid removed, albumin 25%/100 administered afterwards, pt remained hemodynamically stable, fentanyl was given during the paracentesis procedure.        Vital Signs Last 24 Hrs  T(C): 36.6 (15 Apr 2023 12:00), Max: 36.8 (14 Apr 2023 16:00)  T(F): 97.8 (15 Apr 2023 12:00), Max: 98.3 (14 Apr 2023 16:00)  HR: 80 (15 Apr 2023 13:48) (80 - 106)  BP: 138/70 (15 Apr 2023 13:48) (114/65 - 153/70)  BP(mean): 96 (15 Apr 2023 13:48) (81 - 101)  RR: 17 (15 Apr 2023 13:48) (15 - 34)  SpO2: 99% (15 Apr 2023 13:48) (95% - 100%)    Parameters below as of 15 Apr 2023 08:00  Patient On (Oxygen Delivery Method): nasal cannula      I&O's Summary    14 Apr 2023 07:01  -  15 Apr 2023 07:00  --------------------------------------------------------  IN: 1529.8 mL / OUT: 2560 mL / NET: -1030.2 mL    15 Apr 2023 07:01  -  15 Apr 2023 14:46  --------------------------------------------------------  IN: 40 mL / OUT: 225 mL / NET: -185 mL        MEDICATIONS  (STANDING):  aMIOdarone    Tablet 200 milliGRAM(s) Oral daily  chlorhexidine 4% Liquid 1 Application(s) Topical <User Schedule>  dextrose 5%. 1000 milliLiter(s) (50 mL/Hr) IV Continuous <Continuous>  dextrose 5%. 1000 milliLiter(s) (100 mL/Hr) IV Continuous <Continuous>  dextrose 50% Injectable 12.5 Gram(s) IV Push once  dextrose 50% Injectable 25 Gram(s) IV Push once  dextrose 50% Injectable 25 Gram(s) IV Push once  glucagon  Injectable 1 milliGRAM(s) IntraMuscular once  heparin   Injectable 5000 Unit(s) SubCutaneous every 8 hours  insulin glargine Injectable (LANTUS) 8 Unit(s) SubCutaneous at bedtime  insulin lispro (ADMELOG) corrective regimen sliding scale   SubCutaneous three times a day before meals  insulin lispro (ADMELOG) corrective regimen sliding scale   SubCutaneous at bedtime  lidocaine   4% Patch 1 Patch Transdermal every 24 hours  midodrine 5 milliGRAM(s) Oral every 8 hours  nystatin Powder 1 Application(s) Topical every 8 hours  octreotide  Injectable 200 MICROGram(s) SubCutaneous every 8 hours  sodium bicarbonate 1300 milliGRAM(s) Oral three times a day    MEDICATIONS  (PRN):  dextrose Oral Gel 15 Gram(s) Oral once PRN Blood Glucose LESS THAN 70 milliGRAM(s)/deciliter      LABS                                            9.7                   Neurophils% (auto):   71.4   (04-15 @ 00:18):    4.08 )-----------(125          Lymphocytes% (auto):  9.8                                           27.9                   Eosinphils% (auto):   1.7      Manual%: Neutrophils x    ; Lymphocytes x    ; Eosinophils x    ; Bands%: x    ; Blasts x                                    127    |  x      |  x                   Calcium: x     / iCa: x      (04-15 @ 02:39)    ----------------------------<  x         Magnesium: x                                x       |  x      |  1.73             Phosphorous: x        TPro  x      /  Alb  x      /  TBili  0.8    /  DBili  x      /  AST  x      /  ALT  x      /  AlkPhos  x      15 Apr 2023 02:39    ( 04-15 @ 02:39 )   PT: 15.5 sec;   INR: 1.34 ratio  aPTT: x      Culture - Blood (04.13.23 @ 09:00)   Specimen Source: .Blood Blood-Peripheral  Culture Results:   No growth to date.Culture - Body Fluid with Gram Stain (04.13.23 @ 11:08)   Gram Stain:   polymorphonuclear leukocytes seen   Red blood cells seen   No organisms seen   by cytocentrifuge  Specimen Source: Peritoneal Peritoneal Fluid  Culture Results:   No growth to date.Culture - Fungal, Body Fluid (04.13.23 @ 11:08)   Specimen Source: Peritoneal Peritoneal Fluid  Culture Results:   Testing in progress    RADIOLOGY:  < from: CT Chest No Cont (04.14.23 @ 14:51) >  PROCEDURE DATE:  04/14/2023    INTERPRETATION:  CLINICAL INFORMATION: Liver transplant evaluation.   Evaluate for metastatic disease.  COMPARISON: CT abdomen pelvis 1/6/2023  CONTRAST/COMPLICATIONS:  IV Contrast: NONE  Oral Contrast: NONE  Complications: None reported at time of study completion    PROCEDURE:  CT of the Chest was performed.  Sagittal and coronal reformats were performed.    FINDINGS: The quality of the images are degraded by streak artifacts.    LUNGS AND AIRWAYS: Patent central airways.  2 mm anterior right upper   lobe nodule (series 5 image 52). The lungs are otherwise clear.  PLEURA: No pleural effusion.  MEDIASTINUM AND TERESE: No lymphadenopathy.  VESSELS: Left anterior chest wall defibrillator with leads in the right   atrium, right ventricle, and left ventricle.  HEART: Heart size is normal. No pericardial effusion.  CHEST WALL AND LOWER NECK: Within normal limits.  VISUALIZED UPPER ABDOMEN: Moderate volume ascites. Cirrhotic liver.   Ill-defined hepatic lesions.  BONES: Degenerative changes.    IMPRESSION:  Nonspecific 2 mm right upper lobe nodule. Recommend chest CT in 3 months   to determine stability.    < from: NM SPECT/CT Bone, Single Area Single Day (04.14.23 @ 14:06) >  ACC: [id number] EXAM:  NM BONE SPECT CT SA SD   ORDERED BY: HI FENTON   ACC: [id number] EXAM:  NM BONE IMG WHOLE BODY   ORDERED BY: HI FENTON   PROCEDURE DATE:  04/14/2023    INTERPRETATION:  CLINICAL INFORMATION: Patient with cirrhosis, HCC,   portal hypertension. Staging scan pretransplant evaluation.  RADIOPHARMACEUTICAL: 19 mCi Tc-99m MDP, I.V.    TECHNIQUE: Anterior and posterior whole body images were obtained 2-3   hours following administration of radiopharmaceutical.  CHEST  SPECT/CT   was obtained immediately thereafter. The CT protocol was optimized for   attenuation correction and to provide anatomic detail for localization of   SPECT abnormalities. The CT protocol was not designed to produce and   cannot replace state-of-the-art diagnostic CT images with specific   imaging protocols for different body parts and indications.    COMPARISON: None  OTHER STUDIES USED FOR CORRELATION: None    FINDINGS:  Heterogeneous nonspecific uptake is noted along the thoracolumbar spine   likely degenerative in nature. Degenerative changes are noted in both   shoulders right more than left, right sternoclavicular joint, manubrium,   and both knees.    Distinct focus of uptake is noted along the left transverse process of T9   likely injury related.    Left-sided cardiac pacemaker. Cirrhosis. Abdominal ascites. Multiple   lesions are noted in both hepatic lobes.    Both kidneys are visualized.    IMPRESSION: No definite scintigraphic evidence of osseous metastasis.      < from: US Abdomen Doppler (04.14.23 @ 12:30) >  ACC: [id number] EXAM:  US DPLX ABDOMEN   ORDERED BY: AIMEE SKINNER   PROCEDURE DATE:  04/14/2023    INTERPRETATION:  CLINICAL INFORMATION: Cirrhosis.  COMPARISON: CT abdomen pelvis 1/6/2023.  TECHNIQUE: Duplex ultrasound of the abdomen was performed to evaluate the   hepatic vasculature utilizing color and spectral Doppler.    FINDINGS:    Cirrhosis. There is a 6.9 x 4.5 x 5.0 cm hypoechoic mass in the right   hepatic lobe. Additional 1.6 x 1.5 x 1.6 cm hypoechoic lesion in the   right hepatic lobe was visualized.    Moderate to severe volume ascites.    The main, right, and left portal veins are patent with normal direction   of flow. The visualized splenic vein is patent with normal direction of   flow. The hepatic artery is patentwith peak systolic velocity of 63   cm/s. The hepatic veins and IVC are patent with phasic waveforms.    IMPRESSION:  Cirrhosis. Multiple hypoechoic liver lesions which were noted on previous   CT.    Moderate to severe ascites.    Patent hepatic vasculature with normal direction of flow.    ECHO: Pending          ASSESSMENT & PLAN:   Assessment:      67 yr male with stated Hx significant for NICM, AICD, EtOH cirrhosis, eradicated esophageal varices s/p banding, Portal HTN, recurrent LVP, HCC, recent OSH admission for NSVT/AICD eval with subsequent LVP of 8.3 liters  who presented from IR with HYPOtn, [place] requiring Norepi gtt.        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     Plan:            For Followup: MICU Transfer Note    Transfer from: MICU     ) Medicine    (  ) Telemetry     (   ) RCU        (    ) Palliative         (   ) Stroke Unit          (   ) __________________    Accepting physician: Dr. BOB Milan    MICU COURSE:  HPI:      67 yr old male with PMHx NICM (HFimpEF 50%) s/p [employer] biventricular AICD (2014, new generator 12/2022), HLD, DM2, EtOH cirrhosis (last drink spring 2022), Portal HTN, esophageal varices s/p banding with eventual eradication ([facility] health report 8/30/22), recurrent ascites requiring weekly LVP (~6-7 liters) with most recent LVP 4/10/23 with removal of 8.3 liters, Colon Ca (dx ~8/2022) with ? liver mets, HCC, (canceled [facility] living donor liver transplant due to insurance issues), recent PST and eval with hepatology (Dr. Loza) 4/2023 for planned Y90 (Yttrium-90) liver embolization/mapping/shunt study on 4/13/23 (Dr. Baron -I.R.), recent hospitalization at [facility] 4/7//23-4/10/23 for suspected NSVT and AICD evaluation tx with amiodarone therapy and subsequent LVP (as above). AICD interrogated 4/13/23 demonstrated episodes of Vtach without shocks, no episodes since 4/6/23.      Patient Presented this hospitalization 4/13/23 after being found to be HYPOtn with SBP 70's/40's while being evaluated by I.R. for planned Y90 liver mapping, Pt in addition endorsed weakness, lightheadedness, dizziness over 3 day period post recent LVP. In E.D. (4/13/23) found to have HYPOtn with SBP 73/47, HYPOnatremic of 118, sCr 3.12 (baseline 1.39 4/6/23) requiring Norepi gtt. Admitted to MICU (4/13/23) for decompensated cirrhosis, possible HRS-[place] due to suspected hypovolemic shock. The pt has been off of IV pressors since early 4/14, BC x 2 along with diagnostic paracentesis were negative for infection, Sodium levels, LFT's, and renal function has improved. A Paracentesis was done on 4/15/23- 4L of clear straw colored fluid removed, albumin 25%/100 administered afterwards, pt remained hemodynamically stable, fentanyl was given during the paracentesis procedure.      Vital Signs Last 24 Hrs  T(C): 36.6 (15 Apr 2023 12:00), Max: 36.8 (14 Apr 2023 16:00)  T(F): 97.8 (15 Apr 2023 12:00), Max: 98.3 (14 Apr 2023 16:00)  HR: 80 (15 Apr 2023 13:48) (80 - 106)  BP: 138/70 (15 Apr 2023 13:48) (114/65 - 153/70)  BP(mean): 96 (15 Apr 2023 13:48) (81 - 101)  RR: 17 (15 Apr 2023 13:48) (15 - 34)  SpO2: 99% (15 Apr 2023 13:48) (95% - 100%)    Parameters below as of 15 Apr 2023 08:00  Patient On (Oxygen Delivery Method): nasal cannula    I&O's Summary    14 Apr 2023 07:01  -  15 Apr 2023 07:00  --------------------------------------------------------  IN: 1529.8 mL / OUT: 2560 mL / NET: -1030.2 mL    15 Apr 2023 07:01  -  15 Apr 2023 14:46  --------------------------------------------------------  IN: 40 mL / OUT: 225 mL / NET: -185 mL    MEDICATIONS  (STANDING):  aMIOdarone    Tablet 200 milliGRAM(s) Oral daily  chlorhexidine 4% Liquid 1 Application(s) Topical <User Schedule>  dextrose 5%. 1000 milliLiter(s) (50 mL/Hr) IV Continuous <Continuous>  dextrose 5%. 1000 milliLiter(s) (100 mL/Hr) IV Continuous <Continuous>  dextrose 50% Injectable 12.5 Gram(s) IV Push once  dextrose 50% Injectable 25 Gram(s) IV Push once  dextrose 50% Injectable 25 Gram(s) IV Push once  glucagon  Injectable 1 milliGRAM(s) IntraMuscular once  heparin   Injectable 5000 Unit(s) SubCutaneous every 8 hours  insulin glargine Injectable (LANTUS) 8 Unit(s) SubCutaneous at bedtime  insulin lispro (ADMELOG) corrective regimen sliding scale   SubCutaneous three times a day before meals  insulin lispro (ADMELOG) corrective regimen sliding scale   SubCutaneous at bedtime  lidocaine   4% Patch 1 Patch Transdermal every 24 hours  midodrine 5 milliGRAM(s) Oral every 8 hours  nystatin Powder 1 Application(s) Topical every 8 hours  octreotide  Injectable 200 MICROGram(s) SubCutaneous every 8 hours  sodium bicarbonate 1300 milliGRAM(s) Oral three times a day    MEDICATIONS  (PRN):  dextrose Oral Gel 15 Gram(s) Oral once PRN Blood Glucose LESS THAN 70 milliGRAM(s)/deciliter      LABS                                            9.7                   Neurophils% (auto):   71.4   (04-15 @ 00:18):    4.08 )-----------(125          Lymphocytes% (auto):  9.8                                           27.9                   Eosinphils% (auto):   1.7      Manual%: Neutrophils x    ; Lymphocytes x    ; Eosinophils x    ; Bands%: x    ; Blasts x                                    127    |  x      |  x                   Calcium: x     / iCa: x      (04-15 @ 02:39)    ----------------------------<  x         Magnesium: x                                x       |  x      |  1.73             Phosphorous: x        TPro  x      /  Alb  x      /  TBili  0.8    /  DBili  x      /  AST  x      /  ALT  x      /  AlkPhos  x      15 Apr 2023 02:39    ( 04-15 @ 02:39 )   PT: 15.5 sec;   INR: 1.34 ratio  aPTT: x    Culture - Blood (04.13.23 @ 09:00)   Specimen Source: .Blood Blood-Peripheral  Culture Results:   No growth to date.Culture - Body Fluid with Gram Stain (04.13.23 @ 11:08)   Gram Stain:   polymorphonuclear leukocytes seen   Red blood cells seen   No organisms seen   by cytocentrifuge  Specimen Source: Peritoneal Peritoneal Fluid  Culture Results:   No growth to date.Culture - Fungal, Body Fluid (04.13.23 @ 11:08)   Specimen Source: Peritoneal Peritoneal Fluid  Culture Results:   Testing in progress    RADIOLOGY:  < from: CT Chest No Cont (04.14.23 @ 14:51) >  PROCEDURE DATE:  04/14/2023    INTERPRETATION:  CLINICAL INFORMATION: Liver transplant evaluation.   Evaluate for metastatic disease.  COMPARISON: CT abdomen pelvis 1/6/2023  CONTRAST/COMPLICATIONS:  IV Contrast: NONE  Oral Contrast: NONE  Complications: None reported at time of study completion    PROCEDURE:  CT of the Chest was performed.  Sagittal and coronal reformats were performed.    FINDINGS: The quality of the images are degraded by streak artifacts.    LUNGS AND AIRWAYS: Patent central airways.  2 mm anterior right upper   lobe nodule (series 5 image 52). The lungs are otherwise clear.  PLEURA: No pleural effusion.  MEDIASTINUM AND TERESE: No lymphadenopathy.  VESSELS: Left anterior chest wall defibrillator with leads in the right   atrium, right ventricle, and left ventricle.  HEART: Heart size is normal. No pericardial effusion.  CHEST WALL AND LOWER NECK: Within normal limits.  VISUALIZED UPPER ABDOMEN: Moderate volume ascites. Cirrhotic liver.   Ill-defined hepatic lesions.  BONES: Degenerative changes.    IMPRESSION:  Nonspecific 2 mm right upper lobe nodule. Recommend chest CT in 3 months   to determine stability.    < from: NM SPECT/CT Bone, Single Area Single Day (04.14.23 @ 14:06) >  ACC: [id number] EXAM:  NM BONE SPECT CT SA SD   ORDERED BY: HI FENTON   ACC: [id number] EXAM:  NM BONE IMG WHOLE BODY   ORDERED BY: HI FENTON   PROCEDURE DATE:  04/14/2023    INTERPRETATION:  CLINICAL INFORMATION: Patient with cirrhosis, HCC,   portal hypertension. Staging scan pretransplant evaluation.  RADIOPHARMACEUTICAL: 19 mCi Tc-99m MDP, I.V.    TECHNIQUE: Anterior and posterior whole body images were obtained 2-3   hours following administration of radiopharmaceutical.  CHEST  SPECT/CT   was obtained immediately thereafter. The CT protocol was optimized for   attenuation correction and to provide anatomic detail for localization of   SPECT abnormalities. The CT protocol was not designed to produce and   cannot replace state-of-the-art diagnostic CT images with specific   imaging protocols for different body parts and indications.    COMPARISON: None  OTHER STUDIES USED FOR CORRELATION: None    FINDINGS:  Heterogeneous nonspecific uptake is noted along the thoracolumbar spine   likely degenerative in nature. Degenerative changes are noted in both   shoulders right more than left, right sternoclavicular joint, manubrium,   and both knees.    Distinct focus of uptake is noted along the left transverse process of T9   likely injury related.    Left-sided cardiac pacemaker. Cirrhosis. Abdominal ascites. Multiple   lesions are noted in both hepatic lobes.    Both kidneys are visualized.    IMPRESSION: No definite scintigraphic evidence of osseous metastasis.      < from: US Abdomen Doppler (04.14.23 @ 12:30) >  ACC: [id number] EXAM:  US DPLX ABDOMEN   ORDERED BY: AIMEE SKINNER   PROCEDURE DATE:  04/14/2023    INTERPRETATION:  CLINICAL INFORMATION: Cirrhosis.  COMPARISON: CT abdomen pelvis 1/6/2023.  TECHNIQUE: Duplex ultrasound of the abdomen was performed to evaluate the   hepatic vasculature utilizing color and spectral Doppler.    FINDINGS:    Cirrhosis. There is a 6.9 x 4.5 x 5.0 cm hypoechoic mass in the right   hepatic lobe. Additional 1.6 x 1.5 x 1.6 cm hypoechoic lesion in the   right hepatic lobe was visualized.    Moderate to severe volume ascites.    The main, right, and left portal veins are patent with normal direction   of flow. The visualized splenic vein is patent with normal direction of   flow. The hepatic artery is patentwith peak systolic velocity of 63   cm/s. The hepatic veins and IVC are patent with phasic waveforms.    IMPRESSION:  Cirrhosis. Multiple hypoechoic liver lesions which were noted on previous   CT.    Moderate to severe ascites.    Patent hepatic vasculature with normal direction of flow.    ECHO: Pending        ASSESSMENT & PLAN:   Assessment:      67 yr male with stated Hx significant for NICM, AICD, EtOH cirrhosis, eradicated esophageal varices s/p banding, Portal HTN, recurrent LVP, HCC, recent OSH admission for NSVT/AICD eval with subsequent LVP of 8.3 liters  who presented from IR with HYPOtn, A[place] requiring Norepi gtt.        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     Plan:    Assessment and Plan:   · Assessment	  Assessment:      67 yr male with stated Hx significant for NICM, AICD, EtOH cirrhosis, eradicated esophageal varices s/p banding, Portal HTN, recurrent LVP, HCC, recent OSH admission for NSVT/AICD eval with subsequent LVP of 8.3 liters  who presented from IR with HYPOtn, [place] requiring Norepi gtt.        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    Plan:    Neuro: No neurological issues / c/o generalized weakness in the setting of ongoing chronic illness  -c/w neuro checks as per protocol - q 4H  -OOB to chair  -PT consult -pt c/o frequent falls at home  -fall risk precautions  -OT- pt with intentional tremors- add with ADL's  -GOC - full code      Pulm: no gas exchange / oxygenation issues, +c/o some SOB / MON in the setting enlarged abd w ascites  -on R/A- supplemental O2 as needed to maintain O2 sat > 92%  -aspiration precautions  -Bronchodilators q 6 hrs prn for sob/wheezes- available if needed  -HOB >/= 30 degree angle  -incentive spirometry 10x q hour while awake   -chest PT   - OOB     CV: Hx NICM s/p Bivent AICD, +recent hospitalization 4/13/23 OSH for NSVT had been started on Amiodarone- d/c;d (4/7/23-4/10/23)  admitted NS with hypovolemic shock in the setting of LVP 8 liters on 4/10  - Off of Norepi gtt - since yesterday am-(4/14)  -AICD interrogation 4/13/23 - demonstrated recent multiple episodes Vtach with shocks, non since 4/6/23  -obtain MK to eval RVFx/LVx, progression of disease  -home med of carvedilol 25 mg q 12 hrs - resume when stable  -on Midodrine - MAP > 80- decrease dose to 5mg q 8H from 10mg - multi purpose for weaning off of pressors and HRS  -restart amiodarone 200mg qd- as per EPS    GI: a/w decompensated EtOH cirrhosis, recurrent ascites weekly LVP, HCC, eradicated esophageal varices s/p banding (8/30/22), portal HTN, Colon Ca w liver mets, planned Y90 - study 4/13 IR - procedure had been planned w IR for 4/23/23  -MELDNa 26 (4/14/23)- 26 today 4/15 , Alexandro 9.08  -paracentesis as needed- last LVP on 4/10 8Liters, BS / MICU US today 4/15  paracentesis done - 4L removed / albumin given afterwards  -On Octreotide gtt @ 50mg/Hr- d/c and start @ 200mcg q 8H SQ  -f/u Hepatology - plan for Y90 mapping / embolization  -d/c Miralax / bowel regimen- pt w multiple BM's 2/2 bowel regimen  -albumin as needed   -f/u by Hepatology along with IR    Renal: AUSTIN on CRI- RF improving baseline 1.39 a/w 3.1- this am 1.87 -c/w ample u/o   -d/c Collins - tend u/o  -f/u Nephrology - HD / CRRT not needed  -c/w strict I/O  -c/w trending renal function- especially in the setting of HRS - d/c octreotide gtt changed to sq, and midodrine dose decreased to 5mg q8 from 10mg  -c/w NaHCO3 tabs for chronic metabolic acidosis - low serum bicarb    ID: Diagnostic paracentesis done 4/13/23- negative, BC x 2 negative - pt ruled out for septic shock - hypotension r/t hypovolemic shock   -SARS-CoV-2 4/13/23 - ND  -Influ A/B 4/13/24 - ND  -RSV 4/13/23 - ND  -Peritoneal fluid 4/13/23 - gm stain - no organisms  -d/c zosyn ( 4/13/23- 4/15/23) observe off abx    Endo: Hx DMII  -c/w FS q AC / HS  -c/w Lantus qhs- increase to 8U from 5U  -obtain Hgb A1C      For Followup:  -f/u Hepatology - c/w octreotide sq / albumin / midodrine  -f/u IR / Hepatology - for Y 90 procedure was planned for 4/23- plan to proceed  with original plan  -f/u ECHO - order in place for an official echo  -f/u EP for any issues  -f/u Nephrology   -observe off abx

## 2023-04-15 NOTE — PROGRESS NOTE ADULT - PROBLEM SELECTOR PLAN 1
Pt with AUSTIN on CKD likely in setting of hemodynamics, notable hypotension as well as HRS physiology likely also playing a part. [facility] to have a SCr of 1.39 on 4/6 and on admission elevated to 3.2, and improved to 1.7 today UA without proteinuria of hematuria and urine lytes concerning for low Alexandria of 7 suggesting hepatorenal syndrome vs prerenal. pt currently off IV vasopressors. Agree with albumin infusions at this time and octreotide infusion. Collins catheter in place, monitor UOP.   Labs reviewed. No acute indication for RRT at this time.   Monitor labs and urine output. Avoid nephrotoxins. Dose medications as per eGFR.

## 2023-04-16 LAB
AFP-TM SERPL-MCNC: HIGH NG/ML
ALBUMIN SERPL ELPH-MCNC: 3.3 G/DL — SIGNIFICANT CHANGE UP (ref 3.3–5)
ALP SERPL-CCNC: 65 U/L — SIGNIFICANT CHANGE UP (ref 40–120)
ALT FLD-CCNC: 36 U/L — SIGNIFICANT CHANGE UP (ref 10–45)
ANION GAP SERPL CALC-SCNC: 11 MMOL/L — SIGNIFICANT CHANGE UP (ref 5–17)
APTT BLD: 40.6 SEC — HIGH (ref 27.5–35.5)
AST SERPL-CCNC: 27 U/L — SIGNIFICANT CHANGE UP (ref 10–40)
BASOPHILS # BLD AUTO: 0.03 K/UL — SIGNIFICANT CHANGE UP (ref 0–0.2)
BASOPHILS # BLD AUTO: 0.03 K/UL — SIGNIFICANT CHANGE UP (ref 0–0.2)
BASOPHILS NFR BLD AUTO: 0.7 % — SIGNIFICANT CHANGE UP (ref 0–2)
BASOPHILS NFR BLD AUTO: 0.9 % — SIGNIFICANT CHANGE UP (ref 0–2)
BILIRUB SERPL-MCNC: 0.6 MG/DL — SIGNIFICANT CHANGE UP (ref 0.2–1.2)
BUN SERPL-MCNC: 46 MG/DL — HIGH (ref 7–23)
CALCIUM SERPL-MCNC: 8.4 MG/DL — SIGNIFICANT CHANGE UP (ref 8.4–10.5)
CHLORIDE SERPL-SCNC: 99 MMOL/L — SIGNIFICANT CHANGE UP (ref 96–108)
CO2 SERPL-SCNC: 21 MMOL/L — LOW (ref 22–31)
CREAT SERPL-MCNC: 1.21 MG/DL — SIGNIFICANT CHANGE UP (ref 0.5–1.3)
EGFR: 66 ML/MIN/1.73M2 — SIGNIFICANT CHANGE UP
EOSINOPHIL # BLD AUTO: 0.03 K/UL — SIGNIFICANT CHANGE UP (ref 0–0.5)
EOSINOPHIL # BLD AUTO: 0.11 K/UL — SIGNIFICANT CHANGE UP (ref 0–0.5)
EOSINOPHIL NFR BLD AUTO: 0.7 % — SIGNIFICANT CHANGE UP (ref 0–6)
EOSINOPHIL NFR BLD AUTO: 2.8 % — SIGNIFICANT CHANGE UP (ref 0–6)
GAS PNL BLDV: SIGNIFICANT CHANGE UP
GLUCOSE BLDC GLUCOMTR-MCNC: 158 MG/DL — HIGH (ref 70–99)
GLUCOSE BLDC GLUCOMTR-MCNC: 178 MG/DL — HIGH (ref 70–99)
GLUCOSE BLDC GLUCOMTR-MCNC: 206 MG/DL — HIGH (ref 70–99)
GLUCOSE BLDC GLUCOMTR-MCNC: 266 MG/DL — HIGH (ref 70–99)
GLUCOSE SERPL-MCNC: 155 MG/DL — HIGH (ref 70–99)
HCT VFR BLD CALC: 25.7 % — LOW (ref 39–50)
HCT VFR BLD CALC: 30.8 % — LOW (ref 39–50)
HGB BLD-MCNC: 10.3 G/DL — LOW (ref 13–17)
HGB BLD-MCNC: 8.8 G/DL — LOW (ref 13–17)
IMM GRANULOCYTES NFR BLD AUTO: 0.7 % — SIGNIFICANT CHANGE UP (ref 0–0.9)
INR BLD: 1.35 RATIO — HIGH (ref 0.88–1.16)
LYMPHOCYTES # BLD AUTO: 0.35 K/UL — LOW (ref 1–3.3)
LYMPHOCYTES # BLD AUTO: 0.46 K/UL — LOW (ref 1–3.3)
LYMPHOCYTES # BLD AUTO: 10 % — LOW (ref 13–44)
LYMPHOCYTES # BLD AUTO: 9.2 % — LOW (ref 13–44)
MAGNESIUM SERPL-MCNC: 1.8 MG/DL — SIGNIFICANT CHANGE UP (ref 1.6–2.6)
MCHC RBC-ENTMCNC: 33.4 GM/DL — SIGNIFICANT CHANGE UP (ref 32–36)
MCHC RBC-ENTMCNC: 33.9 PG — SIGNIFICANT CHANGE UP (ref 27–34)
MCHC RBC-ENTMCNC: 34.2 GM/DL — SIGNIFICANT CHANGE UP (ref 32–36)
MCHC RBC-ENTMCNC: 34.2 PG — HIGH (ref 27–34)
MCV RBC AUTO: 100 FL — SIGNIFICANT CHANGE UP (ref 80–100)
MCV RBC AUTO: 101.3 FL — HIGH (ref 80–100)
MONOCYTES # BLD AUTO: 0.35 K/UL — SIGNIFICANT CHANGE UP (ref 0–0.9)
MONOCYTES # BLD AUTO: 0.78 K/UL — SIGNIFICANT CHANGE UP (ref 0–0.9)
MONOCYTES NFR BLD AUTO: 17 % — HIGH (ref 2–14)
MONOCYTES NFR BLD AUTO: 9.3 % — SIGNIFICANT CHANGE UP (ref 2–14)
NEUTROPHILS # BLD AUTO: 2.94 K/UL — SIGNIFICANT CHANGE UP (ref 1.8–7.4)
NEUTROPHILS # BLD AUTO: 3.25 K/UL — SIGNIFICANT CHANGE UP (ref 1.8–7.4)
NEUTROPHILS NFR BLD AUTO: 70.9 % — SIGNIFICANT CHANGE UP (ref 43–77)
NEUTROPHILS NFR BLD AUTO: 77.8 % — HIGH (ref 43–77)
NRBC # BLD: 0 /100 WBCS — SIGNIFICANT CHANGE UP (ref 0–0)
PHOSPHATE SERPL-MCNC: 2.5 MG/DL — SIGNIFICANT CHANGE UP (ref 2.5–4.5)
PLATELET # BLD AUTO: 104 K/UL — LOW (ref 150–400)
PLATELET # BLD AUTO: 122 K/UL — LOW (ref 150–400)
POTASSIUM SERPL-MCNC: 3.7 MMOL/L — SIGNIFICANT CHANGE UP (ref 3.5–5.3)
POTASSIUM SERPL-SCNC: 3.7 MMOL/L — SIGNIFICANT CHANGE UP (ref 3.5–5.3)
PROT SERPL-MCNC: 5.4 G/DL — LOW (ref 6–8.3)
PROTHROM AB SERPL-ACNC: 15.7 SEC — HIGH (ref 10.5–13.4)
RBC # BLD: 2.57 M/UL — LOW (ref 4.2–5.8)
RBC # BLD: 3.04 M/UL — LOW (ref 4.2–5.8)
RBC # FLD: 13.2 % — SIGNIFICANT CHANGE UP (ref 10.3–14.5)
RBC # FLD: 13.2 % — SIGNIFICANT CHANGE UP (ref 10.3–14.5)
SODIUM SERPL-SCNC: 131 MMOL/L — LOW (ref 135–145)
WBC # BLD: 3.78 K/UL — LOW (ref 3.8–10.5)
WBC # BLD: 4.58 K/UL — SIGNIFICANT CHANGE UP (ref 3.8–10.5)
WBC # FLD AUTO: 3.78 K/UL — LOW (ref 3.8–10.5)
WBC # FLD AUTO: 4.58 K/UL — SIGNIFICANT CHANGE UP (ref 3.8–10.5)

## 2023-04-16 PROCEDURE — 99232 SBSQ HOSP IP/OBS MODERATE 35: CPT

## 2023-04-16 PROCEDURE — 93306 TTE W/DOPPLER COMPLETE: CPT | Mod: 26

## 2023-04-16 PROCEDURE — 99232 SBSQ HOSP IP/OBS MODERATE 35: CPT | Mod: GC

## 2023-04-16 RX ADMIN — HEPARIN SODIUM 5000 UNIT(S): 5000 INJECTION INTRAVENOUS; SUBCUTANEOUS at 13:41

## 2023-04-16 RX ADMIN — Medication 6: at 12:05

## 2023-04-16 RX ADMIN — HEPARIN SODIUM 5000 UNIT(S): 5000 INJECTION INTRAVENOUS; SUBCUTANEOUS at 21:32

## 2023-04-16 RX ADMIN — AMIODARONE HYDROCHLORIDE 200 MILLIGRAM(S): 400 TABLET ORAL at 05:30

## 2023-04-16 RX ADMIN — Medication 2: at 08:36

## 2023-04-16 RX ADMIN — OCTREOTIDE ACETATE 200 MICROGRAM(S): 200 INJECTION, SOLUTION INTRAVENOUS; SUBCUTANEOUS at 13:41

## 2023-04-16 RX ADMIN — Medication 1300 MILLIGRAM(S): at 05:29

## 2023-04-16 RX ADMIN — OCTREOTIDE ACETATE 200 MICROGRAM(S): 200 INJECTION, SOLUTION INTRAVENOUS; SUBCUTANEOUS at 21:32

## 2023-04-16 RX ADMIN — Medication 2: at 17:04

## 2023-04-16 RX ADMIN — HEPARIN SODIUM 5000 UNIT(S): 5000 INJECTION INTRAVENOUS; SUBCUTANEOUS at 05:30

## 2023-04-16 RX ADMIN — Medication 6 MILLIGRAM(S): at 21:33

## 2023-04-16 RX ADMIN — OCTREOTIDE ACETATE 200 MICROGRAM(S): 200 INJECTION, SOLUTION INTRAVENOUS; SUBCUTANEOUS at 05:30

## 2023-04-16 RX ADMIN — Medication 1300 MILLIGRAM(S): at 13:41

## 2023-04-16 RX ADMIN — LIDOCAINE 1 PATCH: 4 CREAM TOPICAL at 21:31

## 2023-04-16 RX ADMIN — NYSTATIN CREAM 1 APPLICATION(S): 100000 CREAM TOPICAL at 21:32

## 2023-04-16 RX ADMIN — Medication 1300 MILLIGRAM(S): at 21:33

## 2023-04-16 RX ADMIN — NYSTATIN CREAM 1 APPLICATION(S): 100000 CREAM TOPICAL at 05:31

## 2023-04-16 RX ADMIN — INSULIN GLARGINE 8 UNIT(S): 100 INJECTION, SOLUTION SUBCUTANEOUS at 21:33

## 2023-04-16 RX ADMIN — MIDODRINE HYDROCHLORIDE 5 MILLIGRAM(S): 2.5 TABLET ORAL at 05:29

## 2023-04-16 RX ADMIN — CHLORHEXIDINE GLUCONATE 1 APPLICATION(S): 213 SOLUTION TOPICAL at 08:36

## 2023-04-16 NOTE — PROGRESS NOTE ADULT - PROBLEM SELECTOR PLAN 1
Pt with AUSTIN on CKD likely in setting of hemodynamics, notable hypotension as well as HRS physiology likely also playing a part. [facility] to have a SCr of 1.39 on 4/6 and on admission elevated to 3.2, and improved to 1.22 today. UA without proteinuria of hematuria and urine lytes concerning for low Alexandria of 7 suggesting hepatorenal syndrome vs prerenal. pt currently off IV vasopressors. Collins catheter in place, monitor UOP.   Labs reviewed. No acute indication for RRT at this time.   Monitor labs and urine output. Avoid nephrotoxins. Dose medications as per eGFR.

## 2023-04-16 NOTE — PROGRESS NOTE ADULT - NS ATTEND AMEND GEN_ALL_CORE FT
68 y/o M w/HFrEF 2/2 NICM s/p AICD, decompensated ETOH cirrhosis c/b HCC admitted for ICD eval s/p LVP c/b hypotension likely secondary to hypovolemia, AUSTIN on CKD.    - Wean midodrine as tolerated, goal MAP >= 55  - Trend Cr, avoid nephrotoxins, octreotide as per renal  - Free water restriction for hyponatremia  - Monitor off abx  - DVT prophylaxis  - Downgrade to medicine

## 2023-04-16 NOTE — PROGRESS NOTE ADULT - ASSESSMENT
Assessment and Plan:   Assessment:      67 yr male with stated Hx significant for NICM, AICD, EtOH cirrhosis, eradicated esophageal varices s/p banding, Portal HTN, recurrent LVP, HCC, recent OSH admission for NSVT/AICD eval with subsequent LVP of 8.3 liters  who presented from IR to the MICU with HYPOtn, [place] requiring Norepi gtt. The pt was hence, admitted to MICU (4/13/23) for decompensated cirrhosis, possible HRS-AUSTIN due to probable hypovolemic shock in the setting of LVP.    Plan:    Neuro: No neurological issues / c/o generalized weakness in the setting of ongoing chronic illness  -c/w neuro checks as per protocol - q 4H  -OOB to chair  -PT consult -pt endorced frequent falls at home  -fall risk precautions  -OT- pt with intentional tremors- add with ADL's  -GOC - full code    Pulm: no gas exchange / oxygenation issues, reports feeling more comfortable s  -on R/A- supplemental O2 as needed to maintain O2 sat > 92%  -aspiration precautions  -Bronchodilators q 6 hrs prn for sob/wheezes- available if needed  -HOB >/= 30 degree angle  -incentive spirometry 10x q hour while awake   -chest PT   - OOB     CV: Hx NICM s/p Bivent AICD, +recent hospitalization 4/13/23 OSH for NSVT had been started on Amiodarone- d/c;d (4/7/23-4/10/23)  admitted NS with hypovolemic shock in the setting of LVP 8 liters on 4/10  - Off of Norepi gtt - since yesterday am-(4/14)  -AICD interrogation 4/13/23 - demonstrated recent multiple episodes Vtach with shocks, non since 4/6/23  -obtain MK to eval RVFx/LVx, progression of disease  -home med of carvedilol 25 mg q 12 hrs - resume when stable  -on Midodrine - MAP > 80- decrease dose to 5mg q 8H from 10mg - multi purpose for weaning off of pressors and HRS  -restart amiodarone 200mg qd- as per EPS    GI: a/w decompensated EtOH cirrhosis, recurrent ascites weekly LVP, HCC, eradicated esophageal varices s/p banding (8/30/22), portal HTN, Colon Ca w liver mets, planned Y90 - study 4/13 IR - procedure had been planned w IR for 4/23/23  -MELDNa 26 (4/14/23)- 26 today 4/15 , Alexandro 9.08  -paracentesis as needed- last LVP on 4/10 8Liters, BS / MICU US today 4/15  paracentesis done - 4L removed / albumin given afterwards  -On Octreotide gtt @ 50mg/Hr- d/c and start @ 200mcg q 8H SQ  -f/u Hepatology - plan for Y90 mapping / embolization  -d/c Miralax / bowel regimen- pt w multiple BM's 2/2 bowel regimen  -albumin as needed   -f/u by Hepatology along with IR    Renal: AUSTIN on CRI- RF improving baseline 1.39 a/w 3.1- this am 1.87 -c/w ample u/o   -d/c Collins - tend u/o  -f/u Nephrology - HD / CRRT not needed  -c/w strict I/O  -c/w trending renal function- especially in the setting of HRS - d/c octreotide gtt changed to sq, and midodrine dose decreased to 5mg q8 from 10mg  -c/w NaHCO3 tabs for chronic metabolic acidosis - low serum bicarb    ID: Diagnostic paracentesis done 4/13/23- negative, BC x 2 negative - pt ruled out for septic shock - hypotension r/t hypovolemic shock   -SARS-CoV-2 4/13/23 - ND  -Influ A/B 4/13/24 - ND  -RSV 4/13/23 - ND  -Peritoneal fluid 4/13/23 - gm stain - no organisms  -d/c zosyn ( 4/13/23- 4/15/23) observe off abx    Endo: Hx DMII  -c/w FS q AC / HS  -c/w Lantus qhs  -obtain Hgb A1C     Assessment and Plan:   Assessment:      67 yr male with stated Hx significant for NICM, AICD, EtOH cirrhosis, eradicated esophageal varices s/p banding, Portal HTN, recurrent LVP, HCC, recent OSH admission for NSVT/AICD eval with subsequent LVP of 8.3 liters  who presented from IR to the MICU with HYPOtn, [place] requiring Norepi gtt. The pt was hence, admitted to MICU (4/13/23) for decompensated cirrhosis, possible HRS-AUSTIN due to probable hypovolemic shock in the setting of LVP.    Plan:    Neuro: No neurological issues / c/o generalized weakness in the setting of ongoing chronic illness  -c/w neuro checks as per protocol - q 4H  -OOB to chair  -PT consult -pt endorsed frequent falls at home  -fall risk precautions  -OT- pt with intentional tremors- add with ADL's  -GOC - full code    Pulm: no gas exchange / oxygenation issues, reports feeling more comfortable since the therapeutic paracentesis yesterday  -on R/A- supplemental O2 as needed to maintain O2 sat > 92%  -aspiration precautions  -Bronchodilators q 6 hrs prn for sob/wheezes- available if needed  -HOB >/= 30 degree angle  -incentive spirometry 10x q hour while awake   -chest PT   - OOB     CV: Hx NICM s/p Bivent AICD, +recent hospitalization 4/13/23 OSH for NSVT had been started on Amiodarone- d/c;d (4/7/23-4/10/23)- restarted 4/15 @ 200mg qd  admitted to NS with hypovolemic shock in the setting of LVP 8 liters on 4/10  - Off of Norepi gtt - since yesterday am-(4/14)  -AICD interrogation 4/13/23 - demonstrated recent multiple episodes Vtach with shocks, non since 4/6/23  -obtain MK to eval RVFx/LVx, progression of disease  -home med of carvedilol 25 mg q 12 hrs - resume when stable  -on Midodrine - MAP > 80- decrease dose to 5mg q 8H from 10mg - on 4/15- multi purpose for weaning off of pressors and HRS  -restarted amiodarone 200mg qd- as per EPS    GI: a/w decompensated EtOH cirrhosis, recurrent ascites weekly LVP, HCC, eradicated esophageal varices s/p banding (8/30/22), portal HTN, Colon Ca w liver mets, planned Y90 - study 4/13 IR - procedure had been planned w IR for 4/23/23  -MELDNa 26 (4/14 &4/15) , Alexandro 9.08  -paracentesis as needed- last LVP on 4/10 8Liters,  4/15  paracentesis done - 4L removed- clear serous /straw / albumin given afterwards  -C/W Octreotide 200mcg q 8H SQ  -f/u Hepatology - plan for Y90 mapping / embolization  - Miralax d/c'd 2/2 d/c multiple BM's 2/2 bowel regimen  -albumin as needed   -f/u by Hepatology along with IR    Renal: AUST[place] on CRI- RF improving baseline 1.39 a/w 3.1- this am 1.21 -c/w ample u/o   -d/c Collins - tend u/o  -f/u Nephrology - HD / CRRT was not needed- f/u for HRS / HypoNA, metabolic acidosis - sub normal HCO3  -c/w strict I/O  -c/w trending renal function- especially in the setting of HRS - c/w octreotide sq, c/w midodrine dose decreased to 5mg q8 from 10mg 4/15  -c/w NaHCO3 tabs for chronic metabolic acidosis - low serum bicarb - improving    ID: Diagnostic paracentesis done 4/13/23- negative, BC x 2 negative - pt ruled out for septic shock - hypotension r/t hypovolemic shock   -SARS-CoV-2 4/13/23 - ND  -Influ A/B 4/13/24 - ND  -RSV 4/13/23 - ND  -Peritoneal fluid 4/13/23 - gm stain - no organisms- f/u fungal cx pending results  -d/c'd zosyn ( 4/13/23- 4/15/23) observe off abx    Endo: Hx DMII  -c/w FS q AC / HS  -c/w Lantus qhs- increased to 8U (5U)  -consider pre-meal    Pt status improved greatly, is stable for BB to Medical Floor

## 2023-04-16 NOTE — PROGRESS NOTE ADULT - SUBJECTIVE AND OBJECTIVE BOX
[facility] DIVISION OF KIDNEY DISEASES AND HYPERTENSION -- FOLLOW UP NOTE  --------------------------------------------------------------------------------  67 year old male with PMH of HTN, HLD, DM, ETOH liver cirrhosis complicated by esophageal varices s/p banding, portal HTN, HCC, and CKD who presented to the hospital with several days of weakness and unsteadiness. The nephrology team was consulted for AUSTIN on CKD and hypotension. [facility] noted to have a SCr of 1.39 on 4/6 and on admission elevated to 3.2 and now improved to 1.22    24 hour events/subjective:  The patient was seen and examined earlier today in the MICU. Reports feeling okay. Denied any chest pain or shortness of breath.   Underwent paracentesis yesterday.       PAST HISTORY  --------------------------------------------------------------------------------  No significant changes to PMH, PSH, FHx, SHx, unless otherwise noted    ALLERGIES & MEDICATIONS  --------------------------------------------------------------------------------  Allergies    No Known Allergies    Intolerances      Standing Inpatient Medications  aMIOdarone    Tablet 200 milliGRAM(s) Oral daily  chlorhexidine 4% Liquid 1 Application(s) Topical <User Schedule>  dextrose 5%. 1000 milliLiter(s) IV Continuous <Continuous>  dextrose 5%. 1000 milliLiter(s) IV Continuous <Continuous>  dextrose 50% Injectable 25 Gram(s) IV Push once  dextrose 50% Injectable 12.5 Gram(s) IV Push once  dextrose 50% Injectable 25 Gram(s) IV Push once  glucagon  Injectable 1 milliGRAM(s) IntraMuscular once  heparin   Injectable 5000 Unit(s) SubCutaneous every 8 hours  insulin glargine Injectable (LANTUS) 8 Unit(s) SubCutaneous at bedtime  insulin lispro (ADMELOG) corrective regimen sliding scale   SubCutaneous three times a day before meals  insulin lispro (ADMELOG) corrective regimen sliding scale   SubCutaneous at bedtime  lidocaine   4% Patch 1 Patch Transdermal every 24 hours  melatonin 6 milliGRAM(s) Oral at bedtime  midodrine 5 milliGRAM(s) Oral every 8 hours  nystatin Powder 1 Application(s) Topical every 8 hours  octreotide  Injectable 200 MICROGram(s) SubCutaneous every 8 hours  sodium bicarbonate 1300 milliGRAM(s) Oral three times a day    PRN Inpatient Medications  dextrose Oral Gel 15 Gram(s) Oral once PRN      REVIEW OF SYSTEMS    All other systems were reviewed and are negative, except as noted.    VITALS/PHYSICAL EXAM  --------------------------------------------------------------------------------  T(C): 36.5 (04-16-23 @ 00:00), Max: 36.7 (04-15-23 @ 16:00)  HR: 80 (04-16-23 @ 04:00) (79 - 87)  BP: 129/61 (04-16-23 @ 04:00) (116/58 - 153/70)  RR: 13 (04-16-23 @ 04:00) (10 - 26)  SpO2: 94% (04-16-23 @ 04:00) (93% - 100%)  Wt(kg): --        04-14-23 @ 07:01  -  04-15-23 @ 07:00  --------------------------------------------------------  IN: 1529.8 mL / OUT: 2560 mL / NET: -1030.2 mL    04-15-23 @ 07:01  -  04-16-23 @ 06:12  --------------------------------------------------------  IN: 140 mL / OUT: 925 mL / NET: -785 mL        Physical Exam:  	Gen: frail  	HEENT: MMM  	Pulm: CTA B/L  	CV: S1S2  	Abd: distended  	Ext: + LE edema B/L  	Neuro: Awake  	Skin: Warm and dry    LABS/STUDIES  --------------------------------------------------------------------------------              8.8    3.78  >-----------<  104      [04-16-23 @ 00:39]              25.7     131  |  99  |  46  ----------------------------<  155      [04-16-23 @ 00:39]  3.7   |  21  |  1.21        Ca     8.4     [04-16-23 @ 00:39]      Mg     1.8     [04-16-23 @ 00:39]      Phos  2.5     [04-16-23 @ 00:39]    TPro  5.4  /  Alb  3.3  /  TBili  0.6  /  DBili  x   /  AST  27  /  ALT  36  /  AlkPhos  65  [04-16-23 @ 00:39]    PT/INR: PT 15.7 , INR 1.35       [04-16-23 @ 00:39]  PTT: 40.6       [04-16-23 @ 00:39]      Creatinine Trend:  SCr 1.21 [04-16 @ 00:39]  SCr 1.73 [04-15 @ 02:39]  SCr 1.87 [04-15 @ 00:18]  SCr 2.45 [04-14 @ 00:12]  SCr 2.93 [04-13 @ 16:18]    Urinalysis - [04-13-23 @ 11:55]      Color Yellow / Appearance Slightly Turbid / SG 1.022 / pH 5.5      Gluc Negative / Ketone Trace  / Bili Small / Urobili 2 mg/dL       Blood Negative / Protein 30 mg/dL / Leuk Est Moderate / Nitrite Negative      RBC 3 / WBC 5 / Hyaline 15 / Gran  / Sq Epi  / Non Sq Epi  / Bacteria Negative    Urine Creatinine 265      [04-13-23 @ 11:56]  Urine Protein 50      [04-13-23 @ 11:56]  Urine Sodium 7      [04-13-23 @ 11:56]  Urine Urea Nitrogen 304      [04-13-23 @ 11:56]  Urine Potassium 52      [04-13-23 @ 11:56]  Urine Osmolality 333      [04-13-23 @ 11:56]    HbA1c 6.5      [03-11-19 @ 11:55]    HCV 0.13, Nonreact      [04-15-23 @ 00:18]

## 2023-04-16 NOTE — PROGRESS NOTE ADULT - SUBJECTIVE AND OBJECTIVE BOX
CHIEF COMPLAINT:    Interval Events: No overnight events.     REVIEW OF SYSTEMS:  Constitutional: [ ] fevers [ ] chills [ ] weight loss [ ] weight gain  HEENT: [ ] dry eyes [ ] eye irritation [ ] postnasal drip [ ] nasal congestion  CV: [ ] chest pain [ ] orthopnea [ ] palpitations [ ] murmur  Resp: [ ] cough [ ] shortness of breath [ ] dyspnea [ ] wheezing [ ] sputum [ ] hemoptysis  GI: [ ] nausea [ ] vomiting [ ] diarrhea [ ] constipation [ ] abd pain [ ] dysphagia   : [ ] dysuria [ ] nocturia [ ] hematuria [ ] increased urinary frequency  Musculoskeletal: [ ] back pain [ ] myalgias [ ] arthralgias [ ] fracture  Skin: [ ] rash [ ] itch  Neurological: [ ] headache [ ] dizziness [ ] syncope [ ] weakness [ ] numbness  Psychiatric: [ ] anxiety [ ] depression  Endocrine: [ ] diabetes [ ] thyroid problem  Hematologic/Lymphatic: [ ] anemia [ ] bleeding problem  Allergic/Immunologic: [ ] itchy eyes [ ] nasal discharge [ ] hives [ ] angioedema  [ ] All other systems negative  [ ] Unable to assess ROS because ________    OBJECTIVE:  ICU Vital Signs Last 24 Hrs  T(C): 36.1 (16 Apr 2023 04:00), Max: 36.7 (15 Apr 2023 16:00)  T(F): 97 (16 Apr 2023 04:00), Max: 98 (15 Apr 2023 16:00)  HR: 79 (16 Apr 2023 06:00) (79 - 87)  BP: 131/63 (16 Apr 2023 06:00) (116/58 - 155/70)  BP(mean): 91 (16 Apr 2023 06:00) (82 - 100)  ABP: --  ABP(mean): --  RR: 12 (16 Apr 2023 06:00) (10 - 26)  SpO2: 99% (16 Apr 2023 06:00) (93% - 100%)    O2 Parameters below as of 15 Apr 2023 20:00  Patient On (Oxygen Delivery Method): room air            I & O's    04-14 @ 07:01  -  04-15 @ 07:00  --------------------------------------------------------  IN: 1529.8 mL / OUT: 2560 mL / NET: -1030.2 mL    04-15 @ 07:01 - 04-16 @ 06:53  --------------------------------------------------------  IN: 340 mL / OUT: 1025 mL / NET: -685 mL      CAPILLARY BLOOD GLUCOSE      POCT Blood Glucose.: 202 mg/dL (15 Apr 2023 22:07)      PHYSICAL EXAM:  General:   HEENT:   Lymph Nodes:  Neck:   Respiratory:   Cardiovascular:   Abdomen:   Extremities:   Skin:   Neurological:  Psychiatry:    LINES:    HOSPITAL MEDICATIONS:  MEDICATIONS  (STANDING):  aMIOdarone    Tablet 200 milliGRAM(s) Oral daily  chlorhexidine 4% Liquid 1 Application(s) Topical <User Schedule>  dextrose 5%. 1000 milliLiter(s) (100 mL/Hr) IV Continuous <Continuous>  dextrose 5%. 1000 milliLiter(s) (50 mL/Hr) IV Continuous <Continuous>  dextrose 50% Injectable 12.5 Gram(s) IV Push once  dextrose 50% Injectable 25 Gram(s) IV Push once  dextrose 50% Injectable 25 Gram(s) IV Push once  glucagon  Injectable 1 milliGRAM(s) IntraMuscular once  heparin   Injectable 5000 Unit(s) SubCutaneous every 8 hours  insulin glargine Injectable (LANTUS) 8 Unit(s) SubCutaneous at bedtime  insulin lispro (ADMELOG) corrective regimen sliding scale   SubCutaneous three times a day before meals  insulin lispro (ADMELOG) corrective regimen sliding scale   SubCutaneous at bedtime  lidocaine   4% Patch 1 Patch Transdermal every 24 hours  melatonin 6 milliGRAM(s) Oral at bedtime  midodrine 5 milliGRAM(s) Oral every 8 hours  nystatin Powder 1 Application(s) Topical every 8 hours  octreotide  Injectable 200 MICROGram(s) SubCutaneous every 8 hours  sodium bicarbonate 1300 milliGRAM(s) Oral three times a day    MEDICATIONS  (PRN):  dextrose Oral Gel 15 Gram(s) Oral once PRN Blood Glucose LESS THAN 70 milliGRAM(s)/deciliter      LABS:                        8.8    3.78  )-----------( 104      ( 16 Apr 2023 00:39 )             25.7     Hgb Trend: 8.8<--, 9.7<--, 10.2<--, 10.2<--  04-16    131<L>  |  99  |  46<H>  ----------------------------<  155<H>  3.7   |  21<L>  |  1.21    Ca    8.4      16 Apr 2023 00:39  Phos  2.5     04-16  Mg     1.8     04-16    TPro  5.4<L>  /  Alb  3.3  /  TBili  0.6  /  DBili  x   /  AST  27  /  ALT  36  /  AlkPhos  65  04-16    Creatinine Trend: 1.21<--, 1.73<--, 1.87<--, 2.45<--, 2.93<--, 3.06<--  PT/INR - ( 16 Apr 2023 00:39 )   PT: 15.7 sec;   INR: 1.35 ratio         PTT - ( 16 Apr 2023 00:39 )  PTT:40.6 sec      Venous Blood Gas:  04-16 @ 00:30  7.42/38/48/25/81.1  VBG Lactate: 1.1  Venous Blood Gas:  04-15 @ 00:02  7.37/39/44/22/74.6  VBG Lactate: 1.6      MICROBIOLOGY:     RADIOLOGY:  [ ] Reviewed and interpreted by me    EKG: CHIEF COMPLAINT: Symptomatic Hypotension 2/2 Hypovolemic Shock in the setting s/p large volume ascites fluid removal, Decompensated Liver Cirrhosis, HRS, and [place] on CRI      Interval Events: No overnight events.     REVIEW OF SYSTEMS:  Constitutional: [ ] fevers [ ] chills [ ] weight loss [ ] weight gain (x) denies  HEENT: [ ] dry eyes [ ] eye irritation [ ] postnasal drip [ ] nasal congestion (x) denies  CV: [ ] chest pain [ ] orthopnea [ ] palpitations [ ] murmur (x) denies  Resp: [ ] cough [] shortness of breath [] dyspnea [ ] wheezing [ ] sputum [ ] hemoptysis(x) denies  GI: [ ] nausea [ ] vomiting [ ] diarrhea [ ] constipation [] abd pain [ ] dysphagia (x) denies  : [ ] dysuria [ ] nocturia [ ] hematuria [ ] increased urinary frequency (x) denies  Musculoskeletal: [ ] back pain [ ] myalgias [ ] arthralgias [ ] fracture (x) denies  Skin: [ ] rash [ ] itch (x) denies  Neurological: [ ] headache [ ] dizziness [ ] syncope [] weakness [ ] numbness (x) denies  Psychiatric: [ ] anxiety [ ] depression (x) denies  Endocrine: [ ] diabetes [ ] thyroid problem (x) denies  Hematologic/Lymphatic: [ ] anemia [ ] bleeding problem (x) denies  Allergic/Immunologic: [ ] itchy eyes [ ] nasal discharge [ ] hives [ ] angioedema (x) denies  [ x] All other systems - generalized weakness      OBJECTIVE:  ICU Vital Signs Last 24 Hrs  T(C): 36.1 (16 Apr 2023 04:00), Max: 36.7 (15 Apr 2023 16:00)  T(F): 97 (16 Apr 2023 04:00), Max: 98 (15 Apr 2023 16:00)  HR: 79 (16 Apr 2023 06:00) (79 - 87)  BP: 131/63 (16 Apr 2023 06:00) (116/58 - 155/70)  BP(mean): 91 (16 Apr 2023 06:00) (82 - 100)  ABP: --  ABP(mean): --  RR: 12 (16 Apr 2023 06:00) (10 - 26)  SpO2: 99% (16 Apr 2023 06:00) (93% - 100%)    O2 Parameters below as of 15 Apr 2023 20:00  Patient On (Oxygen Delivery Method): room air        I & O's    04-14 @ 07:01  -  04-15 @ 07:00  --------------------------------------------------------  IN: 1529.8 mL / OUT: 2560 mL / NET: -1030.2 mL    04-15 @ 07:01 - 04-16 @ 06:53  --------------------------------------------------------  IN: 340 mL / OUT: 1025 mL / NET: -685 mL      CAPILLARY BLOOD GLUCOSE  POCT Blood Glucose.: 202 mg/dL (15 Apr 2023 22:07)      PHYSICAL EXAM:  General:   HEENT:   Lymph Nodes:  Neck:   Respiratory:   Cardiovascular:   Abdomen:   Extremities:   Skin:   Neurological:  Psychiatry:    LINES: [facility] MEDICATIONS:  MEDICATIONS  (STANDING):  aMIOdarone    Tablet 200 milliGRAM(s) Oral daily  chlorhexidine 4% Liquid 1 Application(s) Topical <User Schedule>  dextrose 5%. 1000 milliLiter(s) (100 mL/Hr) IV Continuous <Continuous>  dextrose 5%. 1000 milliLiter(s) (50 mL/Hr) IV Continuous <Continuous>  dextrose 50% Injectable 12.5 Gram(s) IV Push once  dextrose 50% Injectable 25 Gram(s) IV Push once  dextrose 50% Injectable 25 Gram(s) IV Push once  glucagon  Injectable 1 milliGRAM(s) IntraMuscular once  heparin   Injectable 5000 Unit(s) SubCutaneous every 8 hours  insulin glargine Injectable (LANTUS) 8 Unit(s) SubCutaneous at bedtime  insulin lispro (ADMELOG) corrective regimen sliding scale   SubCutaneous three times a day before meals  insulin lispro (ADMELOG) corrective regimen sliding scale   SubCutaneous at bedtime  lidocaine   4% Patch 1 Patch Transdermal every 24 hours  melatonin 6 milliGRAM(s) Oral at bedtime  midodrine 5 milliGRAM(s) Oral every 8 hours  nystatin Powder 1 Application(s) Topical every 8 hours  octreotide  Injectable 200 MICROGram(s) SubCutaneous every 8 hours  sodium bicarbonate 1300 milliGRAM(s) Oral three times a day    MEDICATIONS  (PRN):  dextrose Oral Gel 15 Gram(s) Oral once PRN Blood Glucose LESS THAN 70 milliGRAM(s)/deciliter      LABS:                        8.8    3.78  )-----------( 104      ( 16 Apr 2023 00:39 )             25.7     Hgb Trend: 8.8<--, 9.7<--, 10.2<--, 10.2<--  04-16    131<L>  |  99  |  46<H>  ----------------------------<  155<H>  3.7   |  21<L>  |  1.21    Ca    8.4      16 Apr 2023 00:39  Phos  2.5     04-16  Mg     1.8     04-16    TPro  5.4<L>  /  Alb  3.3  /  TBili  0.6  /  DBili  x   /  AST  27  /  ALT  36  /  AlkPhos  65  04-16    Creatinine Trend: 1.21<--, 1.73<--, 1.87<--, 2.45<--, 2.93<--, 3.06<--  PT/INR - ( 16 Apr 2023 00:39 )   PT: 15.7 sec;   INR: 1.35 ratio    PTT - ( 16 Apr 2023 00:39 )  PTT:40.6 sec    Venous Blood Gas:  04-16 @ 00:30  7.42/38/48/25/81.1  VBG Lactate: 1.1  Venous Blood Gas:  04-15 @ 00:02  7.37/39/44/22/74.6  VBG Lactate: 1.6      MICROBIOLOGY:     Culture - Fungal, Body Fluid (collected 13 Apr 2023 11:08)  Source: Peritoneal Peritoneal Fluid  Preliminary Report (14 Apr 2023 11:42):    Testing in progress    Culture - Body Fluid with Gram Stain (collected 13 Apr 2023 11:08)  Source: Peritoneal Peritoneal Fluid  Gram Stain (13 Apr 2023 19:05):    polymorphonuclear leukocytes seen    Red blood cells seen    No organisms seen    by cytocentrifuge  Preliminary Report (14 Apr 2023 11:34):    No growth to date.    Culture - Blood (collected 13 Apr 2023 09:19)  Source: .Blood Blood-Venous  Preliminary Report (14 Apr 2023 18:01):    No growth to date.    Culture - Blood (collected 13 Apr 2023 09:00)  Source: .Blood Blood-Peripheral  Preliminary Report (14 Apr 2023 18:01):    No growth to date.      RADIOLOGY:  < from: CT Chest No Cont (04.14.23 @ 14:51) >  PROCEDURE DATE:  04/14/2023    INTERPRETATION:  CLINICAL INFORMATION: Liver transplant evaluation.   Evaluate for metastatic disease.  COMPARISON: CT abdomen pelvis 1/6/2023  CONTRAST/COMPLICATIONS:  IV Contrast: NONE  Oral Contrast: NONE  Complications: None reported at time of study completion    PROCEDURE:  CT of the Chest was performed.  Sagittal and coronal reformats were performed.    FINDINGS: The quality of the images are degraded by streak artifacts.    LUNGS AND AIRWAYS: Patent central airways.  2 mm anterior right upper   lobe nodule (series 5 image 52). The lungs are otherwise clear.  PLEURA: No pleural effusion.  MEDIASTINUM AND TERESE: No lymphadenopathy.  VESSELS: Left anterior chest wall defibrillator with leads in the right   atrium, right ventricle, and left ventricle.  HEART: Heart size is normal. No pericardial effusion.  CHEST WALL AND LOWER NECK: Within normal limits.  VISUALIZED UPPER ABDOMEN: Moderate volume ascites. Cirrhotic liver.   Ill-defined hepatic lesions.  BONES: Degenerative changes.    IMPRESSION:  Nonspecific 2 mm right upper lobe nodule. Recommend chest CT in 3 months   to determine stability.    < from: NM SPECT/CT Bone, Single Area Single Day (04.14.23 @ 14:06) >  ACC: [id number] EXAM:  NM BONE SPECT CT SA SD   ORDERED BY: HI FENTON   ACC: [id number] EXAM:  NM BONE IMG WHOLE BODY   ORDERED BY: HI FENTON   PROCEDURE DATE:  04/14/2023    INTERPRETATION:  CLINICAL INFORMATION: Patient with cirrhosis, HCC,   portal hypertension. Staging scan pretransplant evaluation.  RADIOPHARMACEUTICAL: 19 mCi Tc-99m MDP, I.V.    TECHNIQUE: Anterior and posterior whole body images were obtained 2-3   hours following administration of radiopharmaceutical.  CHEST  SPECT/CT   was obtained immediately thereafter. The CT protocol was optimized for   attenuation correction and to provide anatomic detail for localization of   SPECT abnormalities. The CT protocol was not designed to produce and   cannot replace state-of-the-art diagnostic CT images with specific   imaging protocols for different body parts and indications.    COMPARISON: None  OTHER STUDIES USED FOR CORRELATION: None    FINDINGS:  Heterogeneous nonspecific uptake is noted along the thoracolumbar spine   likely degenerative in nature. Degenerative changes are noted in both   shoulders right more than left, right sternoclavicular joint, manubrium,   and both knees.    Distinct focus of uptake is noted along the left transverse process of T9   likely injury related.    Left-sided cardiac pacemaker. Cirrhosis. Abdominal ascites. Multiple   lesions are noted in both hepatic lobes.    Both kidneys are visualized.    IMPRESSION: No definite scintigraphic evidence of osseous metastasis.      < from: US Abdomen Doppler (04.14.23 @ 12:30) >  ACC: [id number] EXAM:  US DPLX ABDOMEN   ORDERED BY: AIMEE SKINNER   PROCEDURE DATE:  04/14/2023    INTERPRETATION:  CLINICAL INFORMATION: Cirrhosis.  COMPARISON: CT abdomen pelvis 1/6/2023.  TECHNIQUE: Duplex ultrasound of the abdomen was performed to evaluate the   hepatic vasculature utilizing color and spectral Doppler.    FINDINGS:    Cirrhosis. There is a 6.9 x 4.5 x 5.0 cm hypoechoic mass in the right   hepatic lobe. Additional 1.6 x 1.5 x 1.6 cm hypoechoic lesion in the   right hepatic lobe was visualized.    Moderate to severe volume ascites.    The main, right, and left portal veins are patent with normal direction   of flow. The visualized splenic vein is patent with normal direction of   flow. The hepatic artery is patentwith peak systolic velocity of 63   cm/s. The hepatic veins and IVC are patent with phasic waveforms.    IMPRESSION:  Cirrhosis. Multiple hypoechoic liver lesions which were noted on previous   CT.    Moderate to severe ascites.    Patent hepatic vasculature with normal direction of flow.    ECHO: Pending         CHIEF COMPLAINT: Symptomatic Hypotension 2/2 Hypovolemic Shock in the setting s/p large volume ascites fluid removal, Decompensated Liver Cirrhosis, HRS, and [place] on CRI    Interval Events: No overnight events.     REVIEW OF SYSTEMS:  Constitutional: [ ] fevers [ ] chills [ ] weight loss [ ] weight gain (x) denies  HEENT: [ ] dry eyes [ ] eye irritation [ ] postnasal drip [ ] nasal congestion (x) denies  CV: [ ] chest pain [ ] orthopnea [ ] palpitations [ ] murmur (x) denies  Resp: [ ] cough [] shortness of breath [] dyspnea [ ] wheezing [ ] sputum [ ] hemoptysis(x) denies  GI: [ ] nausea [ ] vomiting [ ] diarrhea [ ] constipation [] abd pain [ ] dysphagia (x) denies  : [ ] dysuria [ ] nocturia [ ] hematuria [ ] increased urinary frequency (x) denies  Musculoskeletal: [ ] back pain [ ] myalgias [ ] arthralgias [ ] fracture (x) denies  Skin: [ ] rash [ ] itch (x) denies  Neurological: [ ] headache [ ] dizziness [ ] syncope [] weakness [ ] numbness (x) denies  Psychiatric: [ ] anxiety [ ] depression (x) denies  Endocrine: [ ] diabetes [ ] thyroid problem (x) denies  Hematologic/Lymphatic: [ ] anemia [ ] bleeding problem (x) denies  Allergic/Immunologic: [ ] itchy eyes [ ] nasal discharge [ ] hives [ ] angioedema (x) denies  [ x] All other systems - generalized weakness      OBJECTIVE:  ICU Vital Signs Last 24 Hrs  T(C): 36.1 (16 Apr 2023 04:00), Max: 36.7 (15 Apr 2023 16:00)  T(F): 97 (16 Apr 2023 04:00), Max: 98 (15 Apr 2023 16:00)  HR: 79 (16 Apr 2023 06:00) (79 - 87)  BP: 131/63 (16 Apr 2023 06:00) (116/58 - 155/70)  BP(mean): 91 (16 Apr 2023 06:00) (82 - 100)  ABP: --  ABP(mean): --  RR: 12 (16 Apr 2023 06:00) (10 - 26)  SpO2: 99% (16 Apr 2023 06:00) (93% - 100%)    O2 Parameters below as of 15 Apr 2023 20:00  Patient On (Oxygen Delivery Method): room air        I & O's    04-14 @ 07:01  -  04-15 @ 07:00  --------------------------------------------------------  IN: 1529.8 mL / OUT: 2560 mL / NET: -1030.2 mL    04-15 @ 07:01 - 04-16 @ 06:53  --------------------------------------------------------  IN: 340 mL / OUT: 1025 mL / NET: -685 mL      CAPILLARY BLOOD GLUCOSE  POCT Blood Glucose.: 202 mg/dL (15 Apr 2023 22:07)      PHYSICAL EXAM:  General:   HEENT:   Lymph Nodes:  Neck:   Respiratory:   Cardiovascular:   Abdomen:   Extremities:   Skin:   Neurological:  Psychiatry:    LINES: [facility] MEDICATIONS:  MEDICATIONS  (STANDING):  aMIOdarone    Tablet 200 milliGRAM(s) Oral daily  chlorhexidine 4% Liquid 1 Application(s) Topical <User Schedule>  dextrose 5%. 1000 milliLiter(s) (100 mL/Hr) IV Continuous <Continuous>  dextrose 5%. 1000 milliLiter(s) (50 mL/Hr) IV Continuous <Continuous>  dextrose 50% Injectable 12.5 Gram(s) IV Push once  dextrose 50% Injectable 25 Gram(s) IV Push once  dextrose 50% Injectable 25 Gram(s) IV Push once  glucagon  Injectable 1 milliGRAM(s) IntraMuscular once  heparin   Injectable 5000 Unit(s) SubCutaneous every 8 hours  insulin glargine Injectable (LANTUS) 8 Unit(s) SubCutaneous at bedtime  insulin lispro (ADMELOG) corrective regimen sliding scale   SubCutaneous three times a day before meals  insulin lispro (ADMELOG) corrective regimen sliding scale   SubCutaneous at bedtime  lidocaine   4% Patch 1 Patch Transdermal every 24 hours  melatonin 6 milliGRAM(s) Oral at bedtime  midodrine 5 milliGRAM(s) Oral every 8 hours  nystatin Powder 1 Application(s) Topical every 8 hours  octreotide  Injectable 200 MICROGram(s) SubCutaneous every 8 hours  sodium bicarbonate 1300 milliGRAM(s) Oral three times a day    MEDICATIONS  (PRN):  dextrose Oral Gel 15 Gram(s) Oral once PRN Blood Glucose LESS THAN 70 milliGRAM(s)/deciliter      LABS:                        8.8    3.78  )-----------( 104      ( 16 Apr 2023 00:39 )             25.7     Hgb Trend: 8.8<--, 9.7<--, 10.2<--, 10.2<--  04-16    131<L>  |  99  |  46<H>  ----------------------------<  155<H>  3.7   |  21<L>  |  1.21    Ca    8.4      16 Apr 2023 00:39  Phos  2.5     04-16  Mg     1.8     04-16    TPro  5.4<L>  /  Alb  3.3  /  TBili  0.6  /  DBili  x   /  AST  27  /  ALT  36  /  AlkPhos  65  04-16    Creatinine Trend: 1.21<--, 1.73<--, 1.87<--, 2.45<--, 2.93<--, 3.06<--  PT/INR - ( 16 Apr 2023 00:39 )   PT: 15.7 sec;   INR: 1.35 ratio    PTT - ( 16 Apr 2023 00:39 )  PTT:40.6 sec    Venous Blood Gas:  04-16 @ 00:30  7.42/38/48/25/81.1  VBG Lactate: 1.1  Venous Blood Gas:  04-15 @ 00:02  7.37/39/44/22/74.6  VBG Lactate: 1.6      MICROBIOLOGY:     Culture - Fungal, Body Fluid (collected 13 Apr 2023 11:08)  Source: Peritoneal Peritoneal Fluid  Preliminary Report (14 Apr 2023 11:42):    Testing in progress    Culture - Body Fluid with Gram Stain (collected 13 Apr 2023 11:08)  Source: Peritoneal Peritoneal Fluid  Gram Stain (13 Apr 2023 19:05):    polymorphonuclear leukocytes seen    Red blood cells seen    No organisms seen    by cytocentrifuge  Preliminary Report (14 Apr 2023 11:34):    No growth to date.    Culture - Blood (collected 13 Apr 2023 09:19)  Source: .Blood Blood-Venous  Preliminary Report (14 Apr 2023 18:01):    No growth to date.    Culture - Blood (collected 13 Apr 2023 09:00)  Source: .Blood Blood-Peripheral  Preliminary Report (14 Apr 2023 18:01):    No growth to date.      RADIOLOGY:  < from: CT Chest No Cont (04.14.23 @ 14:51) >  PROCEDURE DATE:  04/14/2023    INTERPRETATION:  CLINICAL INFORMATION: Liver transplant evaluation.   Evaluate for metastatic disease.  COMPARISON: CT abdomen pelvis 1/6/2023  CONTRAST/COMPLICATIONS:  IV Contrast: NONE  Oral Contrast: NONE  Complications: None reported at time of study completion    PROCEDURE:  CT of the Chest was performed.  Sagittal and coronal reformats were performed.    FINDINGS: The quality of the images are degraded by streak artifacts.    LUNGS AND AIRWAYS: Patent central airways.  2 mm anterior right upper   lobe nodule (series 5 image 52). The lungs are otherwise clear.  PLEURA: No pleural effusion.  MEDIASTINUM AND TERESE: No lymphadenopathy.  VESSELS: Left anterior chest wall defibrillator with leads in the right   atrium, right ventricle, and left ventricle.  HEART: Heart size is normal. No pericardial effusion.  CHEST WALL AND LOWER NECK: Within normal limits.  VISUALIZED UPPER ABDOMEN: Moderate volume ascites. Cirrhotic liver.   Ill-defined hepatic lesions.  BONES: Degenerative changes.    IMPRESSION:  Nonspecific 2 mm right upper lobe nodule. Recommend chest CT in 3 months   to determine stability.    < from: NM SPECT/CT Bone, Single Area Single Day (04.14.23 @ 14:06) >  ACC: [id number] EXAM:  NM BONE SPECT CT SA SD   ORDERED BY: HI FENTON   ACC: [id number] EXAM:  NM BONE IMG WHOLE BODY   ORDERED BY: HI FENTON   PROCEDURE DATE:  04/14/2023    INTERPRETATION:  CLINICAL INFORMATION: Patient with cirrhosis, HCC,   portal hypertension. Staging scan pretransplant evaluation.  RADIOPHARMACEUTICAL: 19 mCi Tc-99m MDP, I.V.    TECHNIQUE: Anterior and posterior whole body images were obtained 2-3   hours following administration of radiopharmaceutical.  CHEST  SPECT/CT   was obtained immediately thereafter. The CT protocol was optimized for   attenuation correction and to provide anatomic detail for localization of   SPECT abnormalities. The CT protocol was not designed to produce and   cannot replace state-of-the-art diagnostic CT images with specific   imaging protocols for different body parts and indications.    COMPARISON: None  OTHER STUDIES USED FOR CORRELATION: None    FINDINGS:  Heterogeneous nonspecific uptake is noted along the thoracolumbar spine   likely degenerative in nature. Degenerative changes are noted in both   shoulders right more than left, right sternoclavicular joint, manubrium,   and both knees.    Distinct focus of uptake is noted along the left transverse process of T9   likely injury related.    Left-sided cardiac pacemaker. Cirrhosis. Abdominal ascites. Multiple   lesions are noted in both hepatic lobes.    Both kidneys are visualized.    IMPRESSION: No definite scintigraphic evidence of osseous metastasis.      < from: US Abdomen Doppler (04.14.23 @ 12:30) >  ACC: [id number] EXAM:  US DPLX ABDOMEN   ORDERED BY: AIMEE SKINNER   PROCEDURE DATE:  04/14/2023    INTERPRETATION:  CLINICAL INFORMATION: Cirrhosis.  COMPARISON: CT abdomen pelvis 1/6/2023.  TECHNIQUE: Duplex ultrasound of the abdomen was performed to evaluate the   hepatic vasculature utilizing color and spectral Doppler.    FINDINGS:    Cirrhosis. There is a 6.9 x 4.5 x 5.0 cm hypoechoic mass in the right   hepatic lobe. Additional 1.6 x 1.5 x 1.6 cm hypoechoic lesion in the   right hepatic lobe was visualized.    Moderate to severe volume ascites.    The main, right, and left portal veins are patent with normal direction   of flow. The visualized splenic vein is patent with normal direction of   flow. The hepatic artery is patentwith peak systolic velocity of 63   cm/s. The hepatic veins and IVC are patent with phasic waveforms.    IMPRESSION:  Cirrhosis. Multiple hypoechoic liver lesions which were noted on previous   CT.    Moderate to severe ascites.    Patent hepatic vasculature with normal direction of flow.    ECHO: Pending         CHIEF COMPLAINT: Symptomatic Hypotension 2/2 Hypovolemic Shock in the setting s/p large volume ascites fluid removal, Decompensated Liver Cirrhosis, HRS, and [place] on CRI    Interval Events: No overnight events.     REVIEW OF SYSTEMS:  Constitutional: [ ] fevers [ ] chills [ ] weight loss [ ] weight gain (x) denies  HEENT: [ ] dry eyes [ ] eye irritation [ ] postnasal drip [ ] nasal congestion (x) denies  CV: [ ] chest pain [ ] orthopnea [ ] palpitations [ ] murmur (x) denies  Resp: [ ] cough [] shortness of breath [] dyspnea [ ] wheezing [ ] sputum [ ] hemoptysis(x) denies  GI: [ ] nausea [ ] vomiting [ ] diarrhea [ ] constipation [] abd pain [ ] dysphagia (x) denies  : [ ] dysuria [ ] nocturia [ ] hematuria [ ] increased urinary frequency (x) denies  Musculoskeletal: [ ] back pain [ ] myalgias [ ] arthralgias [ ] fracture (x) denies  Skin: [ ] rash [ ] itch (x) denies  Neurological: [ ] headache [ ] dizziness [ ] syncope [] weakness [ ] numbness (x) denies  Psychiatric: [ ] anxiety [ ] depression (x) denies  Endocrine: [ ] diabetes [ ] thyroid problem (x) denies  Hematologic/Lymphatic: [ ] anemia [ ] bleeding problem (x) denies  Allergic/Immunologic: [ ] itchy eyes [ ] nasal discharge [ ] hives [ ] angioedema (x) denies  [ x] All other systems - generalized weakness      OBJECTIVE:  ICU Vital Signs Last 24 Hrs  T(C): 36.1 (16 Apr 2023 04:00), Max: 36.7 (15 Apr 2023 16:00)  T(F): 97 (16 Apr 2023 04:00), Max: 98 (15 Apr 2023 16:00)  HR: 79 (16 Apr 2023 06:00) (79 - 87)  BP: 131/63 (16 Apr 2023 06:00) (116/58 - 155/70)  BP(mean): 91 (16 Apr 2023 06:00) (82 - 100)  ABP: --  ABP(mean): --  RR: 12 (16 Apr 2023 06:00) (10 - 26)  SpO2: 99% (16 Apr 2023 06:00) (93% - 100%)    O2 Parameters below as of 15 Apr 2023 20:00  Patient On (Oxygen Delivery Method): room air        I & O's    04-14 @ 07:01  -  04-15 @ 07:00  --------------------------------------------------------  IN: 1529.8 mL / OUT: 2560 mL / NET: -1030.2 mL    04-15 @ 07:01 - 04-16 @ 06:53  --------------------------------------------------------  IN: 340 mL / OUT: 1025 mL / NET: -685 mL      CAPILLARY BLOOD GLUCOSE  POCT Blood Glucose.: 202 mg/dL (15 Apr 2023 22:07)      PHYSICAL EXAM:  General: alert, w/o any distress  HEENT: normocephalic, sclera clear off whitish , conjunctiva clear, oral mucosa moist pink, trachea midline  Lymph Nodes: non palp  Neck: supple , neg JVD  Respiratory: qamar equal BS, BCTA, on RA / o2 sat >97%  Cardiovascular: S1S2 RRR, neg ectopy, pulses palp 2+/4  Abdomen: slightly distended, soft, +BS x 4, non tender on palp  Extremities: neg edema  Skin: warm, jaundice hue  Neurological: +PERRA, EOMI  / no neurological deficit   Psychiatry: pleaseant , calm     LINES: [facility] MEDICATIONS:  MEDICATIONS  (STANDING):  aMIOdarone    Tablet 200 milliGRAM(s) Oral daily  chlorhexidine 4% Liquid 1 Application(s) Topical <User Schedule>  dextrose 5%. 1000 milliLiter(s) (100 mL/Hr) IV Continuous <Continuous>  dextrose 5%. 1000 milliLiter(s) (50 mL/Hr) IV Continuous <Continuous>  dextrose 50% Injectable 12.5 Gram(s) IV Push once  dextrose 50% Injectable 25 Gram(s) IV Push once  dextrose 50% Injectable 25 Gram(s) IV Push once  glucagon  Injectable 1 milliGRAM(s) IntraMuscular once  heparin   Injectable 5000 Unit(s) SubCutaneous every 8 hours  insulin glargine Injectable (LANTUS) 8 Unit(s) SubCutaneous at bedtime  insulin lispro (ADMELOG) corrective regimen sliding scale   SubCutaneous three times a day before meals  insulin lispro (ADMELOG) corrective regimen sliding scale   SubCutaneous at bedtime  lidocaine   4% Patch 1 Patch Transdermal every 24 hours  melatonin 6 milliGRAM(s) Oral at bedtime  midodrine 5 milliGRAM(s) Oral every 8 hours  nystatin Powder 1 Application(s) Topical every 8 hours  octreotide  Injectable 200 MICROGram(s) SubCutaneous every 8 hours  sodium bicarbonate 1300 milliGRAM(s) Oral three times a day    MEDICATIONS  (PRN):  dextrose Oral Gel 15 Gram(s) Oral once PRN Blood Glucose LESS THAN 70 milliGRAM(s)/deciliter      LABS:                        8.8    3.78  )-----------( 104      ( 16 Apr 2023 00:39 )             25.7     Hgb Trend: 8.8<--, 9.7<--, 10.2<--, 10.2<--  04-16    131<L>  |  99  |  46<H>  ----------------------------<  155<H>  3.7   |  21<L>  |  1.21    Ca    8.4      16 Apr 2023 00:39  Phos  2.5     04-16  Mg     1.8     04-16    TPro  5.4<L>  /  Alb  3.3  /  TBili  0.6  /  DBili  x   /  AST  27  /  ALT  36  /  AlkPhos  65  04-16    Creatinine Trend: 1.21<--, 1.73<--, 1.87<--, 2.45<--, 2.93<--, 3.06<--  PT/INR - ( 16 Apr 2023 00:39 )   PT: 15.7 sec;   INR: 1.35 ratio    PTT - ( 16 Apr 2023 00:39 )  PTT:40.6 sec    Venous Blood Gas:  04-16 @ 00:30  7.42/38/48/25/81.1  VBG Lactate: 1.1  Venous Blood Gas:  04-15 @ 00:02  7.37/39/44/22/74.6  VBG Lactate: 1.6      MICROBIOLOGY:     Culture - Fungal, Body Fluid (collected 13 Apr 2023 11:08)  Source: Peritoneal Peritoneal Fluid  Preliminary Report (14 Apr 2023 11:42):    Testing in progress    Culture - Body Fluid with Gram Stain (collected 13 Apr 2023 11:08)  Source: Peritoneal Peritoneal Fluid  Gram Stain (13 Apr 2023 19:05):    polymorphonuclear leukocytes seen    Red blood cells seen    No organisms seen    by cytocentrifuge  Preliminary Report (14 Apr 2023 11:34):    No growth to date.    Culture - Blood (collected 13 Apr 2023 09:19)  Source: .Blood Blood-Venous  Preliminary Report (14 Apr 2023 18:01):    No growth to date.    Culture - Blood (collected 13 Apr 2023 09:00)  Source: .Blood Blood-Peripheral  Preliminary Report (14 Apr 2023 18:01):    No growth to date.      RADIOLOGY:  < from: CT Chest No Cont (04.14.23 @ 14:51) >  PROCEDURE DATE:  04/14/2023    INTERPRETATION:  CLINICAL INFORMATION: Liver transplant evaluation.   Evaluate for metastatic disease.  COMPARISON: CT abdomen pelvis 1/6/2023  CONTRAST/COMPLICATIONS:  IV Contrast: NONE  Oral Contrast: NONE  Complications: None reported at time of study completion    PROCEDURE:  CT of the Chest was performed.  Sagittal and coronal reformats were performed.    FINDINGS: The quality of the images are degraded by streak artifacts.    LUNGS AND AIRWAYS: Patent central airways.  2 mm anterior right upper   lobe nodule (series 5 image 52). The lungs are otherwise clear.  PLEURA: No pleural effusion.  MEDIASTINUM AND TERESE: No lymphadenopathy.  VESSELS: Left anterior chest wall defibrillator with leads in the right   atrium, right ventricle, and left ventricle.  HEART: Heart size is normal. No pericardial effusion.  CHEST WALL AND LOWER NECK: Within normal limits.  VISUALIZED UPPER ABDOMEN: Moderate volume ascites. Cirrhotic liver.   Ill-defined hepatic lesions.  BONES: Degenerative changes.    IMPRESSION:  Nonspecific 2 mm right upper lobe nodule. Recommend chest CT in 3 months   to determine stability.    < from: NM SPECT/CT Bone, Single Area Single Day (04.14.23 @ 14:06) >  ACC: [id number] EXAM:  NM BONE SPECT CT SA SD   ORDERED BY: HI FENTON   ACC: [id number] EXAM:  NM BONE IMG WHOLE BODY   ORDERED BY: HI FENTON   PROCEDURE DATE:  04/14/2023    INTERPRETATION:  CLINICAL INFORMATION: Patient with cirrhosis, HCC,   portal hypertension. Staging scan pretransplant evaluation.  RADIOPHARMACEUTICAL: 19 mCi Tc-99m MDP, I.V.    TECHNIQUE: Anterior and posterior whole body images were obtained 2-3   hours following administration of radiopharmaceutical.  CHEST  SPECT/CT   was obtained immediately thereafter. The CT protocol was optimized for   attenuation correction and to provide anatomic detail for localization of   SPECT abnormalities. The CT protocol was not designed to produce and   cannot replace state-of-the-art diagnostic CT images with specific   imaging protocols for different body parts and indications.    COMPARISON: None  OTHER STUDIES USED FOR CORRELATION: None    FINDINGS:  Heterogeneous nonspecific uptake is noted along the thoracolumbar spine   likely degenerative in nature. Degenerative changes are noted in both   shoulders right more than left, right sternoclavicular joint, manubrium,   and both knees.    Distinct focus of uptake is noted along the left transverse process of T9   likely injury related.    Left-sided cardiac pacemaker. Cirrhosis. Abdominal ascites. Multiple   lesions are noted in both hepatic lobes.    Both kidneys are visualized.    IMPRESSION: No definite scintigraphic evidence of osseous metastasis.      < from: US Abdomen Doppler (04.14.23 @ 12:30) >  ACC: [id number] EXAM:  US DPLX ABDOMEN   ORDERED BY: AIMEE SKINNER   PROCEDURE DATE:  04/14/2023    INTERPRETATION:  CLINICAL INFORMATION: Cirrhosis.  COMPARISON: CT abdomen pelvis 1/6/2023.  TECHNIQUE: Duplex ultrasound of the abdomen was performed to evaluate the   hepatic vasculature utilizing color and spectral Doppler.    FINDINGS:    Cirrhosis. There is a 6.9 x 4.5 x 5.0 cm hypoechoic mass in the right   hepatic lobe. Additional 1.6 x 1.5 x 1.6 cm hypoechoic lesion in the   right hepatic lobe was visualized.    Moderate to severe volume ascites.    The main, right, and left portal veins are patent with normal direction   of flow. The visualized splenic vein is patent with normal direction of   flow. The hepatic artery is patentwith peak systolic velocity of 63   cm/s. The hepatic veins and IVC are patent with phasic waveforms.    IMPRESSION:  Cirrhosis. Multiple hypoechoic liver lesions which were noted on previous   CT.    Moderate to severe ascites.    Patent hepatic vasculature with normal direction of flow.    ECHO: Pending

## 2023-04-16 NOTE — PROGRESS NOTE ADULT - ATTENDING COMMENTS
AUSTIN ATN  Decompensated cirrhosis  Renal function improved with improved hemodynamics  Cr 1.2  Good urine output    Diamond Gonzalez MD  Off: [phone]  contact me on teams    (After 5 pm or on weekends please page the on-call fellow/attending, can check [email or web address] for schedule. Login is tanisha escalante, schedule under [facility] medicine, psych, derm)

## 2023-04-16 NOTE — PROGRESS NOTE ADULT - PROBLEM SELECTOR PLAN 4
Pt. with metabolic acidosis in setting of AUSTIN. Continue with PO sodium bicarbonate 1300mg TID tabs. Most recent SCO2 improved to 21. Monitor SCO2.    If any questions, please feel free to contact me     Damian Black  Nephrology Fellow  [facility] Pager: [phone].

## 2023-04-17 LAB
ALBUMIN SERPL ELPH-MCNC: 2.9 G/DL — LOW (ref 3.3–5)
ALP SERPL-CCNC: 64 U/L — SIGNIFICANT CHANGE UP (ref 40–120)
ALT FLD-CCNC: 32 U/L — SIGNIFICANT CHANGE UP (ref 10–45)
ANION GAP SERPL CALC-SCNC: 12 MMOL/L — SIGNIFICANT CHANGE UP (ref 5–17)
APTT BLD: 29.3 SEC — SIGNIFICANT CHANGE UP (ref 27.5–35.5)
AST SERPL-CCNC: 27 U/L — SIGNIFICANT CHANGE UP (ref 10–40)
BILIRUB SERPL-MCNC: 0.5 MG/DL — SIGNIFICANT CHANGE UP (ref 0.2–1.2)
BUN SERPL-MCNC: 33 MG/DL — HIGH (ref 7–23)
CALCIUM SERPL-MCNC: 8.1 MG/DL — LOW (ref 8.4–10.5)
CHLORIDE SERPL-SCNC: 97 MMOL/L — SIGNIFICANT CHANGE UP (ref 96–108)
CO2 SERPL-SCNC: 23 MMOL/L — SIGNIFICANT CHANGE UP (ref 22–31)
CREAT SERPL-MCNC: 1.03 MG/DL — SIGNIFICANT CHANGE UP (ref 0.5–1.3)
EGFR: 80 ML/MIN/1.73M2 — SIGNIFICANT CHANGE UP
GAS PNL BLDV: SIGNIFICANT CHANGE UP
GLUCOSE BLDC GLUCOMTR-MCNC: 140 MG/DL — HIGH (ref 70–99)
GLUCOSE BLDC GLUCOMTR-MCNC: 213 MG/DL — HIGH (ref 70–99)
GLUCOSE BLDC GLUCOMTR-MCNC: 218 MG/DL — HIGH (ref 70–99)
GLUCOSE BLDC GLUCOMTR-MCNC: 244 MG/DL — HIGH (ref 70–99)
GLUCOSE BLDC GLUCOMTR-MCNC: 286 MG/DL — HIGH (ref 70–99)
GLUCOSE SERPL-MCNC: 217 MG/DL — HIGH (ref 70–99)
HCT VFR BLD CALC: 26.8 % — LOW (ref 39–50)
HGB BLD-MCNC: 8.9 G/DL — LOW (ref 13–17)
INR BLD: 1.37 RATIO — HIGH (ref 0.88–1.16)
MAGNESIUM SERPL-MCNC: 1.7 MG/DL — SIGNIFICANT CHANGE UP (ref 1.6–2.6)
MCHC RBC-ENTMCNC: 33.2 GM/DL — SIGNIFICANT CHANGE UP (ref 32–36)
MCHC RBC-ENTMCNC: 33.8 PG — SIGNIFICANT CHANGE UP (ref 27–34)
MCV RBC AUTO: 101.9 FL — HIGH (ref 80–100)
NRBC # BLD: 0 /100 WBCS — SIGNIFICANT CHANGE UP (ref 0–0)
PHOSPHATE SERPL-MCNC: 2 MG/DL — LOW (ref 2.5–4.5)
PLATELET # BLD AUTO: 117 K/UL — LOW (ref 150–400)
POTASSIUM SERPL-MCNC: 3.7 MMOL/L — SIGNIFICANT CHANGE UP (ref 3.5–5.3)
POTASSIUM SERPL-SCNC: 3.7 MMOL/L — SIGNIFICANT CHANGE UP (ref 3.5–5.3)
PROT SERPL-MCNC: 5.2 G/DL — LOW (ref 6–8.3)
PROTHROM AB SERPL-ACNC: 15.8 SEC — HIGH (ref 10.5–13.4)
RBC # BLD: 2.63 M/UL — LOW (ref 4.2–5.8)
RBC # FLD: 13.2 % — SIGNIFICANT CHANGE UP (ref 10.3–14.5)
SODIUM SERPL-SCNC: 132 MMOL/L — LOW (ref 135–145)
WBC # BLD: 3.68 K/UL — LOW (ref 3.8–10.5)
WBC # FLD AUTO: 3.68 K/UL — LOW (ref 3.8–10.5)

## 2023-04-17 PROCEDURE — 99232 SBSQ HOSP IP/OBS MODERATE 35: CPT | Mod: GC

## 2023-04-17 PROCEDURE — 93970 EXTREMITY STUDY: CPT | Mod: 26

## 2023-04-17 PROCEDURE — 99232 SBSQ HOSP IP/OBS MODERATE 35: CPT

## 2023-04-17 RX ORDER — CIPROFLOXACIN LACTATE 400MG/40ML
500 VIAL (ML) INTRAVENOUS DAILY
Refills: 0 | Status: DISCONTINUED | OUTPATIENT
Start: 2023-04-17 | End: 2023-04-17

## 2023-04-17 RX ORDER — INSULIN GLARGINE 100 [IU]/ML
2 INJECTION, SOLUTION SUBCUTANEOUS ONCE
Refills: 0 | Status: COMPLETED | OUTPATIENT
Start: 2023-04-17 | End: 2023-04-17

## 2023-04-17 RX ORDER — FUROSEMIDE 40 MG
20 TABLET ORAL EVERY 24 HOURS
Refills: 0 | Status: DISCONTINUED | OUTPATIENT
Start: 2023-04-17 | End: 2023-04-19

## 2023-04-17 RX ORDER — INSULIN GLARGINE 100 [IU]/ML
10 INJECTION, SOLUTION SUBCUTANEOUS AT BEDTIME
Refills: 0 | Status: DISCONTINUED | OUTPATIENT
Start: 2023-04-17 | End: 2023-04-19

## 2023-04-17 RX ORDER — POTASSIUM PHOSPHATE, MONOBASIC POTASSIUM PHOSPHATE, DIBASIC 236; 224 MG/ML; MG/ML
15 INJECTION, SOLUTION INTRAVENOUS ONCE
Refills: 0 | Status: COMPLETED | OUTPATIENT
Start: 2023-04-17 | End: 2023-04-17

## 2023-04-17 RX ORDER — CEFTRIAXONE 500 MG/1
1000 INJECTION, POWDER, FOR SOLUTION INTRAMUSCULAR; INTRAVENOUS EVERY 24 HOURS
Refills: 0 | Status: DISCONTINUED | OUTPATIENT
Start: 2023-04-17 | End: 2023-04-18

## 2023-04-17 RX ORDER — CEFTRIAXONE 500 MG/1
1000 INJECTION, POWDER, FOR SOLUTION INTRAMUSCULAR; INTRAVENOUS EVERY 24 HOURS
Refills: 0 | Status: DISCONTINUED | OUTPATIENT
Start: 2023-04-17 | End: 2023-04-17

## 2023-04-17 RX ORDER — MAGNESIUM SULFATE 500 MG/ML
2 VIAL (ML) INJECTION ONCE
Refills: 0 | Status: COMPLETED | OUTPATIENT
Start: 2023-04-17 | End: 2023-04-17

## 2023-04-17 RX ORDER — SPIRONOLACTONE 25 MG/1
25 TABLET, FILM COATED ORAL DAILY
Refills: 0 | Status: DISCONTINUED | OUTPATIENT
Start: 2023-04-17 | End: 2023-04-19

## 2023-04-17 RX ADMIN — SPIRONOLACTONE 25 MILLIGRAM(S): 25 TABLET, FILM COATED ORAL at 14:50

## 2023-04-17 RX ADMIN — NYSTATIN CREAM 1 APPLICATION(S): 100000 CREAM TOPICAL at 21:03

## 2023-04-17 RX ADMIN — OCTREOTIDE ACETATE 200 MICROGRAM(S): 200 INJECTION, SOLUTION INTRAVENOUS; SUBCUTANEOUS at 05:24

## 2023-04-17 RX ADMIN — HEPARIN SODIUM 5000 UNIT(S): 5000 INJECTION INTRAVENOUS; SUBCUTANEOUS at 21:02

## 2023-04-17 RX ADMIN — LIDOCAINE 1 PATCH: 4 CREAM TOPICAL at 07:11

## 2023-04-17 RX ADMIN — CEFTRIAXONE 100 MILLIGRAM(S): 500 INJECTION, POWDER, FOR SOLUTION INTRAMUSCULAR; INTRAVENOUS at 17:57

## 2023-04-17 RX ADMIN — LIDOCAINE 1 PATCH: 4 CREAM TOPICAL at 11:05

## 2023-04-17 RX ADMIN — Medication 1300 MILLIGRAM(S): at 05:18

## 2023-04-17 RX ADMIN — NYSTATIN CREAM 1 APPLICATION(S): 100000 CREAM TOPICAL at 13:07

## 2023-04-17 RX ADMIN — INSULIN GLARGINE 2 UNIT(S): 100 INJECTION, SOLUTION SUBCUTANEOUS at 08:13

## 2023-04-17 RX ADMIN — Medication 4: at 08:15

## 2023-04-17 RX ADMIN — CHLORHEXIDINE GLUCONATE 1 APPLICATION(S): 213 SOLUTION TOPICAL at 06:32

## 2023-04-17 RX ADMIN — Medication 25 GRAM(S): at 05:22

## 2023-04-17 RX ADMIN — Medication 20 MILLIGRAM(S): at 14:50

## 2023-04-17 RX ADMIN — INSULIN GLARGINE 10 UNIT(S): 100 INJECTION, SOLUTION SUBCUTANEOUS at 21:21

## 2023-04-17 RX ADMIN — Medication 1300 MILLIGRAM(S): at 13:07

## 2023-04-17 RX ADMIN — NYSTATIN CREAM 1 APPLICATION(S): 100000 CREAM TOPICAL at 05:25

## 2023-04-17 RX ADMIN — Medication 1 MILLIGRAM(S): at 17:34

## 2023-04-17 RX ADMIN — AMIODARONE HYDROCHLORIDE 200 MILLIGRAM(S): 400 TABLET ORAL at 05:18

## 2023-04-17 RX ADMIN — Medication 6 MILLIGRAM(S): at 21:03

## 2023-04-17 RX ADMIN — LIDOCAINE 1 PATCH: 4 CREAM TOPICAL at 21:00

## 2023-04-17 RX ADMIN — HEPARIN SODIUM 5000 UNIT(S): 5000 INJECTION INTRAVENOUS; SUBCUTANEOUS at 05:24

## 2023-04-17 RX ADMIN — Medication 1300 MILLIGRAM(S): at 21:03

## 2023-04-17 RX ADMIN — Medication 6: at 11:08

## 2023-04-17 RX ADMIN — POTASSIUM PHOSPHATE, MONOBASIC POTASSIUM PHOSPHATE, DIBASIC 62.5 MILLIMOLE(S): 236; 224 INJECTION, SOLUTION INTRAVENOUS at 05:19

## 2023-04-17 RX ADMIN — HEPARIN SODIUM 5000 UNIT(S): 5000 INJECTION INTRAVENOUS; SUBCUTANEOUS at 13:07

## 2023-04-17 NOTE — PROGRESS NOTE ADULT - NS ATTEND AMEND GEN_ALL_CORE FT
68 y/o M w/HFrEF 2/2 NICM s/p AICD, decompensated ETOH cirrhosis c/b HCC admitted for ICD eval s/p LVP c/b hypotension likely secondary to hypovolemia, AUSTIN on CKD.    - Trend Cr, avoid nephrotoxins, octreotide as per renal  - Free water restriction for hyponatremia  - Monitor off abx  - DVT prophylaxis  - Downgrade to medicine

## 2023-04-17 NOTE — PROGRESS NOTE ADULT - ASSESSMENT
Assessment and Plan:   Assessment:      67 yr male with stated Hx significant for NICM, AICD, EtOH cirrhosis, eradicated esophageal varices s/p banding, Portal HTN, recurrent LVP, HCC, recent OSH admission for NSVT/AICD eval with subsequent LVP of 8.3 liters at the OSH ([facility]). The pt was presented to IR for Y90 mapping and was found to be in state of Hypovolemic Shock likely 2/2 large volume paracentesis w insufficient albumin resuscitation, in decompensated cirrhosis / HRS, AUSTIN on CRI, and admitted to the MICU for further treatement with vasopressor support.       Plan:  Neuro: No neurological issues / c/o generalized weakness in the setting LVP  -c/w neuro checks as per protocol - q 4H  -OOB to chair  -PT ongoing - pt OOB and ambulating   -fall precautions  -OT- pt with intentional tremors- add with ADL's  -GOC - full code    Pulm: no gas exchange / oxygenation issues, reports feeling more comfortable since the therapeutic paracentesis on 4/15  -on R/A- supplemental O2 as needed to maintain O2 sat > 92%  -aspiration precautions  -Bronchodilators q 6 hrs prn for sob/wheezes- available if needed  -HOB >/= 30 degree angle  -incentive spirometry 10x q hour while awake   -chest PT   - OOB     CV: Hx NICM s/p Bivent AICD, +recent hospitalization 4/13/23 OSH for NSVT had been started on Amiodarone- d/c;d (4/7/23-4/10/23)- restarted 4/15 @ 200mg qd  admitted to NS with hypovolemic shock in the setting of LVP 8 liters on 4/10 at an OSH  - Off of Norepi gtt - since yesterday am-(4/14), midodrine d/c 4/16   -AICD interrogation 4/13/23 - demonstrated recent multiple episodes Vtach with shocks, non since 4/6/23  -obtain MK to eval RVFx/LVx, progression of disease  -home med of carvedilol 25 mg q 12 hrs - resume when stable  -restarted amiodarone 200mg qd- as per EPS    GI: a/w decompensated EtOH cirrhosis, recurrent ascites weekly LVP, HCC, eradicated esophageal varices s/p banding (8/30/22), portal HTN, Colon Ca w liver mets, planned Y90 - study 4/13 IR - procedure had been planned w IR for 4/23/23  -MELDNa 26 (4/14 &4/15) , D[place]ldrey 9.08  -paracentesis as needed- last LVP on 4/10 8Liters,  4/15  paracentesis done - 4L removed- clear serous /straw / albumin given afterwards  -d/c Octreotide 200mcg q 8H SQ  -f/u Hepatology - plan for Y90 mapping / embolization  - Miralax d/c'd 2/2 d/c multiple BM's 2/2 bowel regimen  -albumin as needed   -f/u by Hepatology along with IR- plan to f/u today with Hepatology - re-schedule Y90    Renal: AUSTIN on CRI-  resolved creat 1.03 (baseline 1.39 a/w 3.1)-  -voiding freely - trend I&O  -f/u Nephrology - HD / CRRT was not needed- f/u for HRS / HypoNA, metabolic acidosis, chronic metabolic acidosis in the setting of low serum bicarb levels-  c/w NaHCO3 tabs    ID: Diagnostic paracentesis done 4/13/23- negative, BC x 2 negative - pt ruled out for septic shock - hypotension r/t hypovolemic shock   -SARS-CoV-2 4/13/23 - ND  -Influ A/B 4/13/24 - ND  -RSV 4/13/23 - ND  -Peritoneal fluid 4/13/23 - gm stain - no organisms- f/u fungal cx pending results  -d/c'd zosyn ( 4/13/23- 4/15/23) observe off abx    Endo: Hx DMII  -c/w FS q AC / HS  -c/w Lantus qhs- increased to 8U (5U)  -consider pre-meal    Pt status improved greatly, is stable for BB to Medical Floor     Assessment and Plan:   Assessment:      67 yr male with stated Hx significant for NICM, AICD, EtOH cirrhosis, eradicated esophageal varices s/p banding, Portal HTN, recurrent LVP, HCC, recent OSH admission for NSVT/AICD eval with subsequent LVP of 8.3 liters at the OSH ([facility]). The pt was presented to IR for Y90 mapping and was found to be in state of Hypovolemic Shock likely 2/2 large volume paracentesis w insufficient albumin resuscitation, in decompensated cirrhosis / HRS, AUSTIN on CRI, and admitted to the MICU for further treatement with vasopressor support.       Plan:  Neuro: No neurological issues / c/o generalized weakness in the setting LVP  -c/w neuro checks as per protocol - q 4H  -OOB to chair  -PT ongoing - pt OOB and ambulating   -fall precautions  -OT- pt with intentional tremors- add with ADL's  -GOC - full code    Pulm: no gas exchange / oxygenation issues, reports feeling more comfortable since the therapeutic paracentesis on 4/15  -on R/A- supplemental O2 as needed to maintain O2 sat > 92%  -aspiration precautions  -Bronchodilators q 6 hrs prn for sob/wheezes- available if needed  -HOB >/= 30 degree angle  -incentive spirometry 10x q hour while awake   -chest PT   - OOB     CV: Hx NICM s/p Bivent AICD, +recent hospitalization 4/13/23 OSH for NSVT had been started on Amiodarone- d/c;d (4/7/23-4/10/23)- restarted 4/15 @ 200mg qd  admitted to NS with hypovolemic shock in the setting of LVP 8 liters on 4/10 at an OSH  - Off of Norepi gtt - since yesterday am-(4/14), midodrine d/c 4/16   -AICD interrogation 4/13/23 - demonstrated recent multiple episodes Vtach with shocks, non since 4/6/23  -obtain MK to eval RVFx/LVx, progression of disease  -home med of carvedilol 25 mg q 12 hrs - resume when stable  -restarted amiodarone 200mg qd- as per EPS    GI: a/w decompensated EtOH cirrhosis, recurrent ascites weekly LVP, HCC, eradicated esophageal varices s/p banding (8/30/22), portal HTN, Colon Ca w liver mets, planned Y90 - study 4/13 IR - procedure had been planned w IR for 4/23/23  -MELDNa 26 (4/14 &4/15) , Alexandro 9.08  -paracentesis as needed- last LVP on 4/10 8Liters,  4/15  paracentesis done - 4L removed- clear serous /straw / albumin given afterwards  -d/c Octreotide 200mcg q 8H SQ  -f/u Hepatology - plan for Y90 mapping / embolization  - Miralax d/c'd 2/2 d/c multiple BM's 2/2 bowel regimen  -albumin as needed   -f/u by Hepatology along with IR- plan to f/u today with Hepatology - re-schedule Y90    Renal: AUSTIN on CRI-  resolved creat 1.03 (baseline 1.39 a/w 3.1)-  -voiding freely - trend I&O  -f/u Nephrology - HD / CRRT was not needed- f/u for HRS / HypoNA, metabolic acidosis, chronic metabolic acidosis in the setting of low serum bicarb levels-  c/w NaHCO3 tabs    ID: Diagnostic paracentesis done 4/13/23- negative, BC x 2 negative - pt ruled out for septic shock - hypotension r/t hypovolemic shock   -SARS-CoV-2 4/13/23 - ND  -Influ A/B 4/13/24 - ND  -RSV 4/13/23 - ND  -Peritoneal fluid 4/13/23 - gm stain - no organisms- f/u fungal cx pending results  -d/c'd zosyn ( 4/13/23- 4/15/23) observe off abx    Endo: Hx DMII  -c/w FS q AC / HS  -c/w Lantus qhs- increase to 10U/HS- administer 2U now- pt c/w hyperglyemcia  -consider endocrine consult    Pt status is stable for BB to Medical Floor- stable to c/o planned IR procedure for Y90 mapping f/u with Hepatology today

## 2023-04-17 NOTE — PROGRESS NOTE ADULT - PROBLEM SELECTOR PLAN 1
Pt with AUSTIN on CKD likely in setting of hemodynamics, notable hypotension as well as HRS physiology likely also playing a part. [facility] noted to have a Scr of 1.39 on 4/6 and on admission elevated to 3.2. Scr improved to 1.03 today. UA without proteinuria of hematuria and urine lytes concerning for low [place] of 7 suggesting hepatorenal syndrome vs prerenal. pt currently off IV vasopressors. Collins catheter in place, monitor UOP. Labs reviewed. No acute indication for RRT at this time. Monitor labs and urine output. Avoid nephrotoxins. Dose medications as per eGFR.

## 2023-04-17 NOTE — PROGRESS NOTE ADULT - ASSESSMENT
66 yo M with hypertension, dyslipidemia, DM2, NICM (s/p AICD in 2014) complicated by recurrent VT (currently on amiodarone), and decompensated alcohol-associated cirrhosis complicated by refractory ascites requiring weekly paracentesis, hyponatremia, non-bleeding EV (s/p EVL in 2022), and multifocal HCC, currently admitted to MICU with shock requiring norepinephrine with associated AUSTIN on CKD and acute on chronic hyponatremia.     #Decompensated ETOH Cirrhosis, MELD Na of 10 on 4/17/23  -EV: s/p banding on 2022  -Ascites: requiring weekly paracentesis, last on 4/10 with 8.3 L removed. Diagnostic tap on 4/13 negative for SBP. 4 L removed on 4/15  -Multifocal HCC+with two LIRAD 5 lesion in the Rt hepatic lobe, largest measuring 5.8 cm, pending mapping with IR for Y90 therapy  - NM bone scan (4/14) with no bone metastases.  - Last AFP on 4/16 of 16,616  -HE: none    #AUSTIN stage 3, resolved with IV albumin  #Hyponatremia  #Shock, concerning septic shock: Resolved  Diagnostic paracentesis (4/13) with no SBP. Urinalysis (4/13) negative. Blood cultures (4/13 x2) negative.  Was on Zosyn (4/13-4/15) empirically.    #Nonischemic cardiomyopathy s/p AICD  -Repeat TTE during this admission without abn.     Recommendations:  -Okay to resume home BB, would start at lower dose such as 3.125 mg BID and slowly titrate.   -Low salt diet  -daily INR/CMP  -consider resuming home Lasix 40 mg daily  -Start Aldactone 50 mg daily   -Given Bicarb normal, consider stopping sodium bicarb as it does not help his ascites   -Will need Y90 mapping by IR    No further work up from a Hepatology perspective     Recommendations preliminary until signed by attending.     Nito Andrews MD  Gastroenterology/Hepatology Fellow  1st option: [phone] (text or call), ONLY available from 7:00 am to 5:00 pm.   **Contact on-call GI fellow via answering service ([phone]) from 5pm-7am AND on weekends/holidays**  2nd option: Available via Microsoft Teams  3rd option: Pager: [phone]                     68 yo M with hypertension, dyslipidemia, DM2, NICM (s/p AICD in 2014) complicated by recurrent VT (currently on amiodarone), and decompensated alcohol-associated cirrhosis complicated by refractory ascites requiring weekly paracentesis, hyponatremia, non-bleeding EV (s/p EVL in 2022), and multifocal HCC, currently admitted to MICU with shock requiring norepinephrine with associated AUSTIN on CKD and acute on chronic hyponatremia.     #Decompensated ETOH Cirrhosis, MELD Na of 10 on 4/17/23  -EV: s/p banding on 2022  -Ascites: requiring weekly paracentesis, last on 4/10 with 8.3 L removed. Diagnostic tap on 4/13 negative for SBP. 4 L removed on 4/15  -Multifocal HCC+with two LIRAD 5 lesion in the Rt hepatic lobe, largest measuring 5.8 cm, pending mapping with IR for Y90 therapy  - NM bone scan (4/14) with no bone metastases.  - Last AFP on 4/16 of 16,616  -HE: none    #AUSTIN stage 3, resolved with IV albumin  #Hyponatremia  #Shock, concerning septic shock: Resolved  Diagnostic paracentesis (4/13) with no SBP. Urinalysis (4/13) negative. Blood cultures (4/13 x2) negative.  Was on Zosyn (4/13-4/15) empirically.    #Nonischemic cardiomyopathy s/p AICD  -Repeat TTE during this admission without abn.     Recommendations:  -Okay to resume home BB, would start at lower dose such as 3.125 mg BID and slowly titrate.   -Low salt diet  -daily INR/CMP  -consider resuming home Lasix 40 mg daily  -Start Aldactone 50 mg daily   -Given Bicarb normal, consider stopping sodium bicarb as it does not help his ascites   -Will need Y90 mapping by IR  -Before discharge, would repeat therapeutic paracentesis for abd distension     No further work up from a Hepatology perspective     Recommendations preliminary until signed by attending.     Nito Andrews MD  Gastroenterology/Hepatology Fellow  1st option: [phone] (text or call), ONLY available from 7:00 am to 5:00 pm.   **Contact on-call GI fellow via answering service ([phone]) from 5pm-7am AND on weekends/holidays**  2nd option: Available via Microsoft Teams  3rd option: Pager: [phone]                     68 yo M with hypertension, dyslipidemia, DM2, NICM (s/p AICD in 2014) complicated by recurrent VT (currently on amiodarone), and decompensated alcohol-associated cirrhosis complicated by refractory ascites requiring weekly paracentesis, hyponatremia, non-bleeding EV (s/p EVL in 2022), and multifocal HCC, currently admitted to MICU with shock requiring norepinephrine with associated AUSTIN on CKD and acute on chronic hyponatremia.     #Decompensated ETOH Cirrhosis, MELD Na of 10 on 4/17/23  -EV: s/p banding on 2022  -Ascites: requiring weekly paracentesis, last on 4/10 with 8.3 L removed. Diagnostic tap on 4/13 negative for SBP. 4 L removed on 4/15  -Multifocal HCC+with two LIRAD 5 lesion in the Rt hepatic lobe, largest measuring 5.8 cm, pending mapping with IR for Y90 therapy  - NM bone scan (4/14) with no bone metastases.  - Last AFP on 4/16 of 16,616  -HE: none    #AUSTIN stage 3, resolved with IV albumin  #Hyponatremia  #Shock, concerning septic shock: Resolved  Diagnostic paracentesis (4/13) with no SBP. Urinalysis (4/13) negative. Blood cultures (4/13 x2) negative.  Was on Zosyn (4/13-4/15) empirically.    #Nonischemic cardiomyopathy s/p AICD  -Repeat TTE during this admission without abn.     Recommendations:  -Low salt diet  -daily INR/CMP  -restart Lasix 20 mg daily  -Start Aldactone 25 mg daily   -Given Bicarb normal, consider stopping sodium bicarb as it does not help his ascites   -Will need Y90 mapping by IR, to be done as OP  -Before discharge, would repeat therapeutic paracentesis for abd distension     No further work up from a Hepatology perspective. Patient will follow up with Dr. Loza this week.    Recommendations preliminary until signed by attending.     Nito Andrews MD  Gastroenterology/Hepatology Fellow  1st option: [phone] (text or call), ONLY available from 7:00 am to 5:00 pm.   **Contact on-call GI fellow via answering service ([phone]) from 5pm-7am AND on weekends/holidays**  2nd option: Available via Microsoft Teams  3rd option: Pager: [phone]                     68 yo M with hypertension, dyslipidemia, DM2, NICM (s/p AICD in 2014) complicated by recurrent VT (currently on amiodarone), and decompensated alcohol-associated cirrhosis complicated by refractory ascites requiring weekly paracentesis, hyponatremia, non-bleeding EV (s/p EVL in 2022), and multifocal HCC, currently admitted to MICU with shock requiring norepinephrine with associated AUSTIN on CKD and acute on chronic hyponatremia.     #Decompensated ETOH Cirrhosis, MELD Na of 10 on 4/17/23  -EV: s/p banding on 2022  -Ascites: requiring weekly paracentesis, last on 4/10 with 8.3 L removed. Diagnostic tap on 4/13 negative for SBP. 4 L removed on 4/15  -Multifocal HCC+with two LIRAD 5 lesion in the Rt hepatic lobe, largest measuring 5.8 cm, pending mapping with IR for Y90 therapy  - NM bone scan (4/14) with no bone metastases.  - Last AFP on 4/16 of 16,616  -HE: none    #AUSTIN stage 3, resolved with IV albumin  #Hyponatremia  #Shock, concerning septic shock: Resolved  Diagnostic paracentesis (4/13) with no SBP. Urinalysis (4/13) negative. Blood cultures (4/13 x2) negative.  Was on Zosyn (4/13-4/15) empirically.    #Nonischemic cardiomyopathy s/p AICD  -Repeat TTE during this admission without abn.     Recommendations:  -Low salt diet  -daily INR/CMP  -Start Cipro 500 mg daily for SBP ppx  -restart Lasix 20 mg daily  -Start Aldactone 25 mg daily   -Given Bicarb normal, consider stopping sodium bicarb as it does not help his ascites   -Will need Y90 mapping by IR, to be done as OP  -Before discharge, would repeat therapeutic paracentesis for abd distension     No further work up from a Hepatology perspective. Patient will follow up with Dr. Loza this week.    Recommendations preliminary until signed by attending.     Nito Andrews MD  Gastroenterology/Hepatology Fellow  1st option: [phone] (text or call), ONLY available from 7:00 am to 5:00 pm.   **Contact on-call GI fellow via answering service ([phone]) from 5pm-7am AND on weekends/holidays**  2nd option: Available via Microsoft Teams  3rd option: Pager: [phone]

## 2023-04-17 NOTE — PROGRESS NOTE ADULT - SUBJECTIVE AND OBJECTIVE BOX
[facility] DIVISION OF KIDNEY DISEASES AND HYPERTENSION -- FOLLOW UP NOTE  --------------------------------------------------------------------------------  67 year old male with PMH of HTN, HLD, DM, ETOH liver cirrhosis complicated by esophageal varices s/p banding, portal HTN, HCC, and CKD who presented to the hospital with several days of weakness and unsteadiness. The nephrology team was consulted for AUSTIN on CKD and hypotension. [facility] noted to have a SCr of 1.39 on 4/6 and on admission elevated to 3.2 and now improved to 1.22    24 hour events/subjective:  The patient was seen and examined earlier today in the MICU. Reports feeling okay. Denied any chest pain or shortness of breath.     PAST HISTORY  --------------------------------------------------------------------------------  No significant changes to PMH, PSH, FHx, SHx, unless otherwise noted    ALLERGIES & MEDICATIONS  --------------------------------------------------------------------------------  Allergies    No Known Allergies    Intolerances    Standing Inpatient Medications  aMIOdarone    Tablet 200 milliGRAM(s) Oral daily  chlorhexidine 4% Liquid 1 Application(s) Topical <User Schedule>  dextrose 5%. 1000 milliLiter(s) IV Continuous <Continuous>  dextrose 5%. 1000 milliLiter(s) IV Continuous <Continuous>  dextrose 50% Injectable 12.5 Gram(s) IV Push once  dextrose 50% Injectable 25 Gram(s) IV Push once  dextrose 50% Injectable 25 Gram(s) IV Push once  glucagon  Injectable 1 milliGRAM(s) IntraMuscular once  heparin   Injectable 5000 Unit(s) SubCutaneous every 8 hours  insulin glargine Injectable (LANTUS) 10 Unit(s) SubCutaneous at bedtime  insulin lispro (ADMELOG) corrective regimen sliding scale   SubCutaneous three times a day before meals  insulin lispro (ADMELOG) corrective regimen sliding scale   SubCutaneous at bedtime  lidocaine   4% Patch 1 Patch Transdermal every 24 hours  melatonin 6 milliGRAM(s) Oral at bedtime  nystatin Powder 1 Application(s) Topical every 8 hours  sodium bicarbonate 1300 milliGRAM(s) Oral three times a day    PRN Inpatient Medications  dextrose Oral Gel 15 Gram(s) Oral once PRN    REVIEW OF SYSTEMS  --------------------------------------------------------------------------------  Gen: No fevers   Respiratory: No dyspnea  CV: No chest pain  GI: No abdominal pain  : No dysuria  MSK: No  edema  Neuro: no dizziness   All other systems were reviewed and are negative, except as noted.    VITALS/PHYSICAL EXAM  --------------------------------------------------------------------------------  T(C): 36.7 (04-17-23 @ 08:00), Max: 36.9 (04-16-23 @ 20:00)  HR: 80 (04-17-23 @ 08:00) (80 - 80)  BP: 127/66 (04-17-23 @ 08:00) (103/57 - 155/68)  RR: 20 (04-17-23 @ 08:00) (12 - 20)  SpO2: 99% (04-17-23 @ 08:00) (94% - 100%)  Wt(kg): --  04-16-23 @ 07:01  -  04-17-23 @ 07:00  --------------------------------------------------------  IN: 1527.5 mL / OUT: 250 mL / NET: 1277.5 mL    Physical Exam:  	Gen: frail  	HEENT: MMM  	Pulm: CTA B/L  	CV: S1S2  	Abd: distended  	Ext: + LE edema B/L  	Neuro: Awake  	Skin: Warm and dry    LABS/STUDIES  --------------------------------------------------------------------------------              8.9    3.68  >-----------<  117      [04-17-23 @ 00:17]              26.8     132  |  97  |  33  ----------------------------<  217      [04-17-23 @ 00:17]  3.7   |  23  |  1.03        Ca     8.1     [04-17-23 @ 00:17]      Mg     1.7     [04-17-23 @ 00:17]      Phos  2.0     [04-17-23 @ 00:17]    TPro  5.2  /  Alb  2.9  /  TBili  0.5  /  DBili  x   /  AST  27  /  ALT  32  /  AlkPhos  64  [04-17-23 @ 00:17]    PT/INR: PT 15.8 , INR 1.37       [04-17-23 @ 00:17]  PTT: 29.3       [04-17-23 @ 00:17]    Creatinine Trend:  SCr 1.03 [04-17 @ 00:17]  SCr 1.21 [04-16 @ 00:39]  SCr 1.73 [04-15 @ 02:39]  SCr 1.87 [04-15 @ 00:18]  SCr 2.45 [04-14 @ 00:12]    Urinalysis - [04-13-23 @ 11:55]      Color Yellow / Appearance Slightly Turbid / SG 1.022 / pH 5.5      Gluc Negative / Ketone Trace  / Bili Small / Urobili 2 mg/dL       Blood Negative / Protein 30 mg/dL / Leuk Est Moderate / Nitrite Negative      RBC 3 / WBC 5 / Hyaline 15 / Gran  / Sq Epi  / Non Sq Epi  / Bacteria Negative    Urine Creatinine 265      [04-13-23 @ 11:56]  Urine Protein 50      [04-13-23 @ 11:56]  Urine Sodium 7      [04-13-23 @ 11:56]  Urine Urea Nitrogen 304      [04-13-23 @ 11:56]  Urine Potassium 52      [04-13-23 @ 11:56]  Urine Osmolality 333      [04-13-23 @ 11:56]    HbA1c 6.5      [03-11-19 @ 11:55]    HCV 0.13, Nonreact      [04-15-23 @ 00:18]

## 2023-04-17 NOTE — PROGRESS NOTE ADULT - SUBJECTIVE AND OBJECTIVE BOX
CHIEF COMPLAINT: Hypovolemic Shock, Decompensated Liver Cirrhosis, Hyponatremia, AUSTIN on CRI    Interval Events: No overnight events. Pt remains hemodynamically stable, thrombocytopenia improving, AUSTIN on CRI resolved, and c/o to wait for a bed on the medical floor.    Assessment:      67 yr male with stated Hx significant for NICM, AICD, EtOH cirrhosis, eradicated esophageal varices s/p banding, Portal HTN, recurrent LVP, HCC, recent OSH admission for NSVT/AICD eval with subsequent LVP of 8.3 liters  who presented from IR for Y90 procedure mapping and found to be in Hypovolemic shock, Hyponatremic, and hence admitted to MICU for further management.     REVIEW OF SYSTEMS:  Constitutional: [ ] fevers [ ] chills [ ] weight loss [ ] weight gain (x) denies  HEENT: [ ] dry eyes [ ] eye irritation [ ] postnasal drip [ ] nasal congestion (x) denies  CV: [ ] chest pain [ ] orthopnea [ ] palpitations [ ] murmur (x) denies  Resp: [ ] cough [] shortness of breath [] dyspnea [ ] wheezing [ ] sputum [ ] hemoptysis(x) denies  GI: [ ] nausea [ ] vomiting [ ] diarrhea [ ] constipation [] abd pain [ ] dysphagia (x) denies  : [ ] dysuria [ ] nocturia [ ] hematuria [ ] increased urinary frequency (x) denies  Musculoskeletal: [ ] back pain [ ] myalgias [ ] arthralgias [ ] fracture (x) denies  Skin: [ ] rash [ ] itch (x) denies  Neurological: [ ] headache [ ] dizziness [ ] syncope [] weakness [ ] numbness (x) denies  Psychiatric: [ ] anxiety [ ] depression (x) denies  Endocrine: [ ] diabetes [ ] thyroid problem (x) denies  Hematologic/Lymphatic: [ ] anemia [ ] bleeding problem (x) denies  Allergic/Immunologic: [ ] itchy eyes [ ] nasal discharge [ ] hives [ ] angioedema (x) denies  [ x] All other systems - generalized weakness    OBJECTIVE:  ICU Vital Signs Last 24 Hrs  T(C): 36.8 (17 Apr 2023 04:00), Max: 36.9 (16 Apr 2023 20:00)  T(F): 98.2 (17 Apr 2023 04:00), Max: 98.4 (16 Apr 2023 20:00)  HR: 80 (17 Apr 2023 05:00) (79 - 80)  BP: 111/78 (17 Apr 2023 05:00) (103/57 - 155/68)  BP(mean): 86 (17 Apr 2023 05:00) (75 - 106)  ABP: --  ABP(mean): --  RR: 14 (17 Apr 2023 05:00) (12 - 19)  SpO2: 94% (17 Apr 2023 05:00) (94% - 100%)    O2 Parameters below as of 16 Apr 2023 20:00  Patient On (Oxygen Delivery Method): room air    I & O's  04-15 @ 07:01  -  04-16 @ 07:00  --------------------------------------------------------  IN: 340 mL / OUT: 1025 mL / NET: -685 mL  04-16 @ 07:01  -  04-17 @ 05:57  --------------------------------------------------------  IN: 1527.5 mL / OUT: 250 mL / NET: 1277.5 mL    CAPILLARY BLOOD GLUCOSE  POCT Blood Glucose.: 206 mg/dL (16 Apr 2023 21:30)    PHYSICAL EXAM:  General: alert, w/o any distress  HEENT: normocephalic, sclera clear off whitish , conjunctiva clear, oral mucosa moist pink, trachea midline  Lymph Nodes: non palp  Neck: supple , neg JVD  Respiratory: qamar equal BS, BCTA, on RA / o2 sat >97%  Cardiovascular: S1S2 RRR, neg ectopy, pulses palp 2+/4  Abdomen: slightly distended, soft, +BS x 4, non tender on palp  Extremities: neg edema  Skin: warm, jaundice hue  Neurological: +PERRA, EOMI  / no neurological deficit   Psychiatry: pleaseant , calm       LINES: [facility] MEDICATIONS:  MEDICATIONS  (STANDING):  aMIOdarone    Tablet 200 milliGRAM(s) Oral daily  chlorhexidine 4% Liquid 1 Application(s) Topical <User Schedule>  dextrose 5%. 1000 milliLiter(s) (100 mL/Hr) IV Continuous <Continuous>  dextrose 5%. 1000 milliLiter(s) (50 mL/Hr) IV Continuous <Continuous>  dextrose 50% Injectable 12.5 Gram(s) IV Push once  dextrose 50% Injectable 25 Gram(s) IV Push once  dextrose 50% Injectable 25 Gram(s) IV Push once  glucagon  Injectable 1 milliGRAM(s) IntraMuscular once  heparin   Injectable 5000 Unit(s) SubCutaneous every 8 hours  insulin glargine Injectable (LANTUS) 8 Unit(s) SubCutaneous at bedtime  insulin lispro (ADMELOG) corrective regimen sliding scale   SubCutaneous three times a day before meals  insulin lispro (ADMELOG) corrective regimen sliding scale   SubCutaneous at bedtime  lidocaine   4% Patch 1 Patch Transdermal every 24 hours  melatonin 6 milliGRAM(s) Oral at bedtime  nystatin Powder 1 Application(s) Topical every 8 hours  sodium bicarbonate 1300 milliGRAM(s) Oral three times a day    MEDICATIONS  (PRN):  dextrose Oral Gel 15 Gram(s) Oral once PRN Blood Glucose LESS THAN 70 milliGRAM(s)/deciliter    LABS:             8.9    3.68  )-----------( 117      ( 17 Apr 2023 00:17 )             26.8     Hgb Trend: 8.9<--, 10.3<--, 8.8<--, 9.7<--, 10.2<--  04-17    132<L>  |  97  |  33<H>  ----------------------------<  217<H>  3.7   |  23  |  1.03    Ca    8.1<L>      17 Apr 2023 00:17  Phos  2.0     04-17  Mg     1.7     04-17    TPro  5.2<L>  /  Alb  2.9<L>  /  TBili  0.5  /  DBili  x   /  AST  27  /  ALT  32  /  AlkPhos  64  04-17    Creatinine Trend: 1.03<--, 1.21<--, 1.73<--, 1.87<--, 2.45<--, 2.93<--  PT/INR - ( 17 Apr 2023 00:17 )   PT: 15.8 sec;   INR: 1.37 ratio    PTT - ( 17 Apr 2023 00:17 )  PTT:29.3 sec    Venous Blood Gas:  04-17 @ 00:00  7.36/46/30/26/46.9  VBG Lactate: 2.0  Venous Blood Gas:  04-16 @ 00:30  7.42/38/48/25/81.1  VBG Lactate: 1.1      MICROBIOLOGY:     Culture - Fungal, Body Fluid (collected 13 Apr 2023 11:08)  Source: Peritoneal Peritoneal Fluid  Preliminary Report (14 Apr 2023 11:42):    Testing in progress    Culture - Body Fluid with Gram Stain (collected 13 Apr 2023 11:08)  Source: Peritoneal Peritoneal Fluid  Gram Stain (13 Apr 2023 19:05):    polymorphonuclear leukocytes seen    Red blood cells seen    No organisms seen    by cytocentrifuge  Preliminary Report (14 Apr 2023 11:34):    No growth to date.    Culture - Blood (collected 13 Apr 2023 09:19)  Source: .Blood Blood-Venous  Preliminary Report (14 Apr 2023 18:01):    No growth to date.    Culture - Blood (collected 13 Apr 2023 09:00)  Source: .Blood Blood-Peripheral  Preliminary Report (14 Apr 2023 18:01):    No growth to date.    RADIOLOGY:  < from: CT Chest No Cont (04.14.23 @ 14:51) >  PROCEDURE DATE:  04/14/2023    INTERPRETATION:  CLINICAL INFORMATION: Liver transplant evaluation.   Evaluate for metastatic disease.  COMPARISON: CT abdomen pelvis 1/6/2023  CONTRAST/COMPLICATIONS:  IV Contrast: NONE  Oral Contrast: NONE  Complications: None reported at time of study completion    FINDINGS: The quality of the images are degraded by streak artifacts.    LUNGS AND AIRWAYS: Patent central airways.  2 mm anterior right upper   lobe nodule (series 5 image 52). The lungs are otherwise clear.  PLEURA: No pleural effusion.  MEDIASTINUM AND TERESE: No lymphadenopathy.  VESSELS: Left anterior chest wall defibrillator with leads in the right   atrium, right ventricle, and left ventricle.  HEART: Heart size is normal. No pericardial effusion.  CHEST WALL AND LOWER NECK: Within normal limits.  VISUALIZED UPPER ABDOMEN: Moderate volume ascites. Cirrhotic liver.   Ill-defined hepatic lesions.  BONES: Degenerative changes.    IMPRESSION:  Nonspecific 2 mm right upper lobe nodule. Recommend chest CT in 3 months   to determine stability.    < from: NM SPECT/CT Bone, Single Area Single Day (04.14.23 @ 14:06) >  ACC: [id number] EXAM:  NM BONE SPECT CT SA SD   ORDERED BY: HI FENTON   ACC: [id number] EXAM:  NM BONE IMG WHOLE BODY   ORDERED BY: HI FENTON   PROCEDURE DATE:  04/14/2023    INTERPRETATION:  CLINICAL INFORMATION: Patient with cirrhosis, HCC,   portal hypertension. Staging scan pretransplant evaluation.  RADIOPHARMACEUTICAL: 19 mCi Tc-99m MDP, I.V.    COMPARISON: None  OTHER STUDIES USED FOR CORRELATION: None    FINDINGS:  Heterogeneous nonspecific uptake is noted along the thoracolumbar spine   likely degenerative in nature. Degenerative changes are noted in both   shoulders right more than left, right sternoclavicular joint, manubrium,   and both knees.    Distinct focus of uptake is noted along the left transverse process of T9   likely injury related.    Left-sided cardiac pacemaker. Cirrhosis. Abdominal ascites. Multiple   lesions are noted in both hepatic lobes.    Both kidneys are visualized.    IMPRESSION: No definite scintigraphic evidence of osseous metastasis.    < from: US Abdomen Doppler (04.14.23 @ 12:30) >  ACC: [id number] EXAM:  US DPLX ABDOMEN   ORDERED BY: AIMEE SKINNER   PROCEDURE DATE:  04/14/2023    INTERPRETATION:  CLINICAL INFORMATION: Cirrhosis.  COMPARISON: CT abdomen pelvis 1/6/2023.  TECHNIQUE: Duplex ultrasound of the abdomen was performed to evaluate the   hepatic vasculature utilizing color and spectral Doppler.    FINDINGS:    Cirrhosis. There is a 6.9 x 4.5 x 5.0 cm hypoechoic mass in the right   hepatic lobe. Additional 1.6 x 1.5 x 1.6 cm hypoechoic lesion in the   right hepatic lobe was visualized.    Moderate to severe volume ascites.    The main, right, and left portal veins are patent with normal direction   of flow. The visualized splenic vein is patent with normal direction of   flow. The hepatic artery is patentwith peak systolic velocity of 63   cm/s. The hepatic veins and IVC are patent with phasic waveforms.    IMPRESSION:  Cirrhosis. Multiple hypoechoic liver lesions which were noted on previous   CT.    Moderate to severe ascites.    Patent hepatic vasculature with normal direction of flow.    ECHO:   < from: TTE W or WO Ultrasound Enhancing Agent (04.16.23 @ 07:54) >  TRANSTHORACIC ECHOCARDIOGRAM REPORT     Pt. Name:       ELIAS MOORE Study Date:    4/16/2023  MRN: [id number]             YOB: 1955  Accession #:    [id number]              Age:           67 years  Account#:       [id number]           Gender:        M  Heart Rate:     80 bpm                 Height:        72.00 in (182.88 cm)  Rhythm:         sinus rhythm           Weight:        157.00 lb (71.22 kg)  Blood Pressure: 131/63 mmHg            BSA/BMI:       1.92 m² / 21.29 kg/m²    CPT:               ECHO TTE WO CON COMP W DOPP - 19182.m  Indication(s):     Chest pain, unspecified - R07.9  Procedure:         Transthoracic echocardiogram with 2-D, M-mode and complete                     spectral and color flow Doppler. Strain imaging performed for                     evaluation of regional and global myocardial shape and                     dimensions. Realtime and full volume 3-dimensional imaging                     performed.  CONCLUSIONS:      1. Normal left ventricular cavity size. The left ventricular wall thickness is normal. The left ventricular systolic function is normal with an ejection fraction of 54 % by 3D. There are no regional wall motion abnormalities seen.   2. There is normal left ventricular diastolic function.   3. Normal right ventricular cavity size and normal systolic function.   4. There is trace mitral and tricuspid regurgitation.   5. No pericardial effusion seen.   6. No prior echocardiogram is available for comparison.  ________________________________________________________________________________________  FINDINGS:  Left Ventricle:  Normal left ventricular cavity size. The left ventricular wall thickness is normal. The left ventricular systolic function is normal with a calculated ejection fraction of 54 % by 3D. There are no regional wall motion abnormalities seen. There is normal left ventricular diastolic function, with normal filling pressure. Global longitudinal strain is 20.5 % (normal < -18%). GLS was assessed on Nexgence echo machine with a heart rate of 80 bpm and a blood pressure of 131/63 mmHg.     Right Ventricle:  Normal right ventricular cavity size and normal systolic function. Tricuspid annular plane systolic excursion (TAPSE) is 2.1 cm (normal >=1.7 cm). A device lead is visualized in the right ventricle.     Left Atrium:  The left atrium is moderately dilated, with an indexed volume of 36 ml/m².     Right Atrium:  The right atrium is normal. There is a pacer lead seen in the right atrium.     Aortic Valve:  The aortic valve is tricuspid with normal structure without stenosis. There is no evidence of aortic regurgitation.     Mitral Valve:  Structurally normal mitral valvewith normal leaflet excursion. There is calcification of the mitral valve annulus. There is trace mitral regurgitation.     Tricuspid Valve:  Structurally normal tricuspid valve with normal leaflet excursion. There is trace tricuspid regurgitation.    Pulmonic Valve:  Structurally normal pulmonic valve with normal leaflet excursion. There is trace pulmonic regurgitation.     Aorta:  The aortic annulus and aortic root appear normal in size.     Pericardium:  No pericardial effusion seen.  ____________________________________________________________________  QUANTITATIVE DATA  Left Ventricle Measurements                         Indexed to BSA  IVSd (2D):   1.0 cm                Strain Measurements  LVPWd (2D):  0.9 cm                Global Pk Long Strain:  -20.5 %  LVIDd (2D):  4.4 cm                Endo Pk Strain A4C:     -19.2 %  LVIDs (2D):  3.1 cm                Endo Pk Strain A2C:     -21.5 %  LV Mass:     136 g  70.6 g/m²      Endo Pk Strain A3C:     -20.9 %     MV E Vmax:    0.41 m/s  MV A Vmax:    0.77 m/s  MV E/A:       0.53  e' lateral:   9.25 cm/s  e' medial:    4.46 cm/s  E/e' lateral: 4.44  E/e' medial:  9.22  E/e' Average: 6.00       Left Atrium Measurements                      Indexed to BSA  LA Diam 2D: 3.80  LA Vol BP:  69.0 ml 36 ml/m²    Right Ventricle Measurements     TAPSE:            2.1 cm  RV Base (RVID1):  3.1 cm  RV Mid (RVID2):   2.1 cm  RV Major (RVID3): 2.8 cm    LVOT / RVOT/ Qp/Qs Data:  LVOT Diameter:   2.00 cm  LVOT Vmax:       1.26 m/s  LVOT VTI:      24.70 cm  LVOT SV:         77.6 ml  LVOT SV Indexed: 40.37 ml/m²    Mitral Valve Measurements     MV E Vmax: 0.4 m/s  MV A Vmax: 0.8 m/s  MV E/A:    0.5     Tricuspid Valve Measurements     TR Vmax:          2.2 m/s  TR Peak Gradient: 19.7 mmHg

## 2023-04-17 NOTE — PROGRESS NOTE ADULT - PROBLEM SELECTOR PLAN 4
Pt. with metabolic acidosis in setting of AUSTIN. Continue with PO sodium bicarbonate 1300mg TID tabs. Most recent SCO2 improved to 23. Monitor SCO2.    If you have any questions, please feel free to contact me  Andrew Echeverria  Nephrology Fellow  [phone]/ [employer] Teams(Preferred)  (After 5pm or on weekends please page the on-call fellow). Pt. with metabolic acidosis in setting of AUSTIN and liver dysfunction. Will hold off PO sodium bicarbonate 1300mg TID as the acidemia will help in excreting the ammonia. Most recent SCO2 improved to 23. Monitor SCO2.    If you have any questions, please feel free to contact me  Andrew Echeverria  Nephrology Fellow  [phone]/ [employer] Teams(Preferred)  (After 5pm or on weekends please page the on-call fellow).

## 2023-04-17 NOTE — PROGRESS NOTE ADULT - ATTENDING COMMENTS
67 year old male with PMH of HTN, HLD, DM, ETOH liver cirrhosis complicated by esophageal varices s/p banding, portal HTN, HCC, and CKD who presented to the hospital with several days of weakness and unsteadiness. The nephrology team was consulted for AUSTIN on CKD and hypotension. On review of [facility] pt noted to have a SCr of 1.39 on 4/6 and on admission elevated to 3.2 and now improved to 1.22  1.  Hepatorenal failure--Ulytes classic and current improvement on appropriate rx.  Non oliguric, no HD requiring    2.  Hyperkalemia--improving.  Ifremains <4 likely benefit from aldactone.    3.  Hyponatremia--fluid restriction.  NO thiazide diuretics (MRA likely valuable)    discussed with MICU team  Edward Brandt MD  contact me on TEAMS

## 2023-04-17 NOTE — CHART NOTE - NSCHARTNOTEFT_GEN_A_CORE
Nutrition Follow Up Note  Patient seen for: initial malnutrition follow up. Chart reviewed and events noted.     Per Chart: Pt is a 67 yr male with stated Hx significant for NICM, AICD, EtOH cirrhosis, eradicated esophageal varices s/p banding, Portal HTN, recurrent LVP, HCC, recent OSH admission for NSVT/AICD eval with subsequent LVP of 8.3 liters  who presented from IR for Y90 procedure mapping and found to be in Hypovolemic shock, Hyponatremic, and hence admitted to MICU for further management.     Source: [x] Patient       [x] Medical Record        [] RN        [] Family at bedside       [] Other:    -If unable to interview patient: [] Trach/Vent/BiPAP  [] Disoriented/confused/inappropriate to interview    Nutrition-Related Events:   - Pressors:  [] Yes    [x] No   - Propofol:  [] Yes    [x] No         - Rate: __mL/hr. If maintained x 24 hours, propofol will provide:     Diet Order:   Diet, Consistent Carbohydrate Renal w/Evening Snack:   1200mL Fluid Restriction ([id number])  Supplement Feeding Modality:  Oral  Nepro Cans or Servings Per Day:  1       Frequency:  Two Times a day (23)    Is current diet order appropriate/adequate? See recommendations below    PO intake :   [x] >75%  Adequate    [] 50-75%  Fair       [] <50%  Poor     Pt endorses good PO intake and appetite. Finished all of breakfast and supplement this morning. No chewing issues reported. RD reinforced need for adequate protein-energy intake. Food preferences obtained. Pt made aware RD to remain available.     Nutrition-related concerns:  - EtOH cirrhosis, decompensated with paracentesis as needed. 4/15  paracentesis done - 4L removed  - Hyponatremia. Ordered for fluid restriction   - AUSTIN, resolved  - Low Phos  s/p KPhosphate   - Hx of diabetes; elevated BG, on sliding scale of insulin + Lantus     GI: Pt denies recent N/V, constipation, or diarrhea.  Last BM .   Bowel Regimen? [] Yes   [x] No    Weights:   Daily Weight in k.5 (-), 71.6 (-)  Hx of paracentesis thus wt fluctuations likely    Drug Dosing Weight  Height (cm): 182.9 (2023 13:17)  Weight (kg): 71.6 (2023 13:17)  BMI (kg/m2): 21.4 (2023 13:17)    MEDICATIONS  (STANDING):  aMIOdarone    Tablet 200 milliGRAM(s) Oral daily  dextrose 5%. 1000 milliLiter(s) (100 mL/Hr) IV Continuous <Continuous>  dextrose 5%. 1000 milliLiter(s) (50 mL/Hr) IV Continuous <Continuous>  dextrose 50% Injectable 12.5 Gram(s) IV Push once  dextrose 50% Injectable 25 Gram(s) IV Push once  dextrose 50% Injectable 25 Gram(s) IV Push once  glucagon  Injectable 1 milliGRAM(s) IntraMuscular once  heparin   Injectable 5000 Unit(s) SubCutaneous every 8 hours  insulin glargine Injectable (LANTUS) 10 Unit(s) SubCutaneous at bedtime  insulin lispro (ADMELOG) corrective regimen sliding scale   SubCutaneous three times a day before meals  insulin lispro (ADMELOG) corrective regimen sliding scale   SubCutaneous at bedtime  melatonin 6 milliGRAM(s) Oral at bedtime  sodium bicarbonate 1300 milliGRAM(s) Oral three times a day    Pertinent Labs:  @ 00:17: Na 132<L>, BUN 33<H>, Cr 1.03, <H>, K+ 3.7, Phos 2.0<L>, Mg 1.7, Alk Phos 64, ALT/SGPT 32, AST/SGOT 27    A1C with Estimated Average Glucose Result: 6.5 % (23 @ 10:18)    Finger Sticks:  POCT Blood Glucose.: 218 mg/dL ( @ 08:08)  POCT Blood Glucose.: 206 mg/dL ( @ 21:30)  POCT Blood Glucose.: 158 mg/dL ( @ 17:02)  POCT Blood Glucose.: 266 mg/dL ( @ 11:49)    Skin per nursing documentation: No pressure injuries noted.  Edema per nursing documentation: None noted     Based on dosing wt 71.6 kG  Estimated Energy Needs: (30-35 kcals/kG) 3577-1861 kcals   Estimated Protein Needs: (1.5-2.0 gm/kG) 107.4-143.2 gm  Fluid needs deferred to provider.     Previous Nutrition Diagnosis:   1) Severe chronic malnutrition  2) Increased protein-energy needs  Nutrition Diagnosis is: [x] ongoing  [] resolved [] not applicable     Nutrition Care Plan:  [x] In Progress  [] Achieved  [] Not applicable    New Nutrition Diagnosis: [x] Not applicable    Nutrition Interventions:     Education Provided:       [x] Yes:  [] No: See above    Recommendations:      1) Low Phos noted; Change diet to Consistent Carbohydrate w/Evening Snack, low Na. Fluid needs deferred to provider.   2) Given fluid restriction + higher protein needs, can continue Nepro.   -> Continue to trend renal lab values to change supplement.  3) As medically feasible, consider addition of multivitamin for micronutrient coverage.    Monitoring and Evaluation:   Continue to monitor nutritional intake, tolerance to diet prescription, weights, labs, skin integrity    RD remains available upon request and will follow up per protocol  Norma Beltran RD, MS, CDN, [facility] Pager #[phone]

## 2023-04-17 NOTE — PROGRESS NOTE ADULT - SUBJECTIVE AND OBJECTIVE BOX
Interval Events:   [facility] Medications:  aMIOdarone    Tablet 200 milliGRAM(s) Oral daily  chlorhexidine 4% Liquid 1 Application(s) Topical <User Schedule>  dextrose 5%. 1000 milliLiter(s) IV Continuous <Continuous>  dextrose 5%. 1000 milliLiter(s) IV Continuous <Continuous>  dextrose 50% Injectable 12.5 Gram(s) IV Push once  dextrose 50% Injectable 25 Gram(s) IV Push once  dextrose 50% Injectable 25 Gram(s) IV Push once  dextrose Oral Gel 15 Gram(s) Oral once PRN  glucagon  Injectable 1 milliGRAM(s) IntraMuscular once  heparin   Injectable 5000 Unit(s) SubCutaneous every 8 hours  insulin glargine Injectable (LANTUS) 10 Unit(s) SubCutaneous at bedtime  insulin glargine Injectable (LANTUS) 2 Unit(s) SubCutaneous once  insulin lispro (ADMELOG) corrective regimen sliding scale   SubCutaneous three times a day before meals  insulin lispro (ADMELOG) corrective regimen sliding scale   SubCutaneous at bedtime  lidocaine   4% Patch 1 Patch Transdermal every 24 hours  melatonin 6 milliGRAM(s) Oral at bedtime  nystatin Powder 1 Application(s) Topical every 8 hours  sodium bicarbonate 1300 milliGRAM(s) Oral three times a day      ROS: All system reviewed and negative except as mentioned above.    PHYSICAL EXAM:   Vital Signs:  Vital Signs Last 24 Hrs  T(C): 36.8 (2023 04:00), Max: 36.9 (2023 20:00)  T(F): 98.2 (2023 04:00), Max: 98.4 (2023 20:00)  HR: 80 (2023 07:00) (79 - 80)  BP: 118/57 (2023 07:00) (103/57 - 155/68)  BP(mean): 81 (2023 07:00) (75 - 106)  RR: 12 (2023 07:00) (12 - 19)  SpO2: 98% (2023 07:00) (94% - 100%)    Parameters below as of 2023 20:00  Patient On (Oxygen Delivery Method): room air      Daily     Daily Weight in k.5 (2023 06:00)    GENERAL:  NAD, Appears stated age  HEENT:  NC/AT,  conjunctivae clear and pink, sclera -anicteric  CHEST:  Normal Effort, Breath sounds clear  HEART:  RRR, S1 + S2, no murmurs  ABDOMEN:  Soft, non-tender, non-distended, normoactive bowel sounds,  no masses  EXTREMITIES:  no cyanosis or edema  SKIN:  Warm & Dry. No rash or erythema  NEURO:  Alert, oriented, no focal deficit    LABS:                        8.9    3.68  )-----------( 117      ( 2023 00:17 )             26.8     Mean Cell Volume: 101.9 fl (- @ 00:17)        132<L>  |  97  |  33<H>  ----------------------------<  217<H>  3.7   |  23  |  1.03    Ca    8.1<L>      2023 00:17  Phos  2.0       Mg     1.7         TPro  5.2<L>  /  Alb  2.9<L>  /  TBili  0.5  /  DBili  x   /  AST  27  /  ALT  32  /  AlkPhos  64      LIVER FUNCTIONS - ( 2023 00:17 )  Alb: 2.9 g/dL / Pro: 5.2 g/dL / ALK PHOS: 64 U/L / ALT: 32 U/L / AST: 27 U/L / GGT: x           PT/INR - ( 2023 00:17 )   PT: 15.8 sec;   INR: 1.37 ratio         PTT - ( 2023 00:17 )  PTT:29.3 sec                            8.9    3.68  )-----------( 117      ( 2023 00:17 )             26.8                         10.3   4.58  )-----------( 122      ( 2023 15:31 )             30.8                         8.8    3.78  )-----------( 104      ( 2023 00:39 )             25.7                         9.7    4.08  )-----------( 125      ( 15 Apr 2023 00:18 )             27.9       Imaging: Images reviewed.         Interval Events:   [facility] Medications:  aMIOdarone    Tablet 200 milliGRAM(s) Oral daily  chlorhexidine 4% Liquid 1 Application(s) Topical <User Schedule>  dextrose 5%. 1000 milliLiter(s) IV Continuous <Continuous>  dextrose 5%. 1000 milliLiter(s) IV Continuous <Continuous>  dextrose 50% Injectable 12.5 Gram(s) IV Push once  dextrose 50% Injectable 25 Gram(s) IV Push once  dextrose 50% Injectable 25 Gram(s) IV Push once  dextrose Oral Gel 15 Gram(s) Oral once PRN  glucagon  Injectable 1 milliGRAM(s) IntraMuscular once  heparin   Injectable 5000 Unit(s) SubCutaneous every 8 hours  insulin glargine Injectable (LANTUS) 10 Unit(s) SubCutaneous at bedtime  insulin glargine Injectable (LANTUS) 2 Unit(s) SubCutaneous once  insulin lispro (ADMELOG) corrective regimen sliding scale   SubCutaneous three times a day before meals  insulin lispro (ADMELOG) corrective regimen sliding scale   SubCutaneous at bedtime  lidocaine   4% Patch 1 Patch Transdermal every 24 hours  melatonin 6 milliGRAM(s) Oral at bedtime  nystatin Powder 1 Application(s) Topical every 8 hours  sodium bicarbonate 1300 milliGRAM(s) Oral three times a day      ROS: All system reviewed and negative except as mentioned above.    PHYSICAL EXAM:   Vital Signs:  Vital Signs Last 24 Hrs  T(C): 36.8 (2023 04:00), Max: 36.9 (2023 20:00)  T(F): 98.2 (2023 04:00), Max: 98.4 (2023 20:00)  HR: 80 (2023 07:00) (79 - 80)  BP: 118/57 (2023 07:00) (103/57 - 155/68)  BP(mean): 81 (2023 07:00) (75 - 106)  RR: 12 (2023 07:00) (12 - 19)  SpO2: 98% (2023 07:00) (94% - 100%)    Parameters below as of 2023 20:00  Patient On (Oxygen Delivery Method): room air      Daily     Daily Weight in k.5 (2023 06:00)    GENERAL:  NAD, Appears stated age  HEENT:  NC/AT,  conjunctivae clear and pink, sclera -anicteric  CHEST:  Normal Effort, Breath sounds clear  HEART:  RRR, S1 + S2, no murmurs  ABDOMEN:  Soft, mildly distended  EXTREMITIES:  no cyanosis, + 2 pitting edema in LE  SKIN:  Warm & Dry. No rash or erythema  NEURO:  Alert, oriented, no focal deficit    LABS:                        8.9    3.68  )-----------( 117      ( 2023 00:17 )             26.8     Mean Cell Volume: 101.9 fl (- @ 00:17)        132<L>  |  97  |  33<H>  ----------------------------<  217<H>  3.7   |  23  |  1.03    Ca    8.1<L>      :17  Phos  2.0       Mg     1.7         TPro  5.2<L>  /  Alb  2.9<L>  /  TBili  0.5  /  DBili  x   /  AST  27  /  ALT  32  /  AlkPhos  64      LIVER FUNCTIONS - ( 2023 00:17 )  Alb: 2.9 g/dL / Pro: 5.2 g/dL / ALK PHOS: 64 U/L / ALT: 32 U/L / AST: 27 U/L / GGT: x           PT/INR - ( 2023 00:17 )   PT: 15.8 sec;   INR: 1.37 ratio         PTT - ( :17 )  PTT:29.3 sec                            8.9    3.68  )-----------( 117      ( 2023 00:17 )             26.8                         10.3   4.58  )-----------( 122      ( 2023 15:31 )             30.8                         8.8    3.78  )-----------( 104      ( 2023 00:39 )             25.7                         9.7    4.08  )-----------( 125      ( 15 Apr 2023 00:18 )             27.9       Imaging: Images reviewed.

## 2023-04-18 DIAGNOSIS — I10 ESSENTIAL (PRIMARY) HYPERTENSION: ICD-10-CM

## 2023-04-18 DIAGNOSIS — Z29.9 ENCOUNTER FOR PROPHYLACTIC MEASURES, UNSPECIFIED: ICD-10-CM

## 2023-04-18 DIAGNOSIS — E78.5 HYPERLIPIDEMIA, UNSPECIFIED: ICD-10-CM

## 2023-04-18 DIAGNOSIS — N50.89 OTHER SPECIFIED DISORDERS OF THE MALE GENITAL ORGANS: ICD-10-CM

## 2023-04-18 DIAGNOSIS — E11.9 TYPE 2 DIABETES MELLITUS WITHOUT COMPLICATIONS: ICD-10-CM

## 2023-04-18 DIAGNOSIS — C22.0 LIVER CELL CARCINOMA: ICD-10-CM

## 2023-04-18 DIAGNOSIS — K74.60 UNSPECIFIED CIRRHOSIS OF LIVER: ICD-10-CM

## 2023-04-18 DIAGNOSIS — R57.1 HYPOVOLEMIC SHOCK: ICD-10-CM

## 2023-04-18 DIAGNOSIS — R63.8 OTHER SYMPTOMS AND SIGNS CONCERNING FOOD AND FLUID INTAKE: ICD-10-CM

## 2023-04-18 DIAGNOSIS — Z86.79 PERSONAL HISTORY OF OTHER DISEASES OF THE CIRCULATORY SYSTEM: ICD-10-CM

## 2023-04-18 LAB
CULTURE RESULTS: SIGNIFICANT CHANGE UP
GLUCOSE BLDC GLUCOMTR-MCNC: 140 MG/DL — HIGH (ref 70–99)
GLUCOSE BLDC GLUCOMTR-MCNC: 163 MG/DL — HIGH (ref 70–99)
GLUCOSE BLDC GLUCOMTR-MCNC: 188 MG/DL — HIGH (ref 70–99)
GLUCOSE BLDC GLUCOMTR-MCNC: 258 MG/DL — HIGH (ref 70–99)
SARS-COV-2 RNA SPEC QL NAA+PROBE: SIGNIFICANT CHANGE UP
SPECIMEN SOURCE: SIGNIFICANT CHANGE UP

## 2023-04-18 PROCEDURE — 93975 VASCULAR STUDY: CPT | Mod: 26

## 2023-04-18 PROCEDURE — 76870 US EXAM SCROTUM: CPT | Mod: 26

## 2023-04-18 PROCEDURE — 76705 ECHO EXAM OF ABDOMEN: CPT | Mod: 26,59

## 2023-04-18 PROCEDURE — 99223 1ST HOSP IP/OBS HIGH 75: CPT

## 2023-04-18 RX ORDER — ATORVASTATIN CALCIUM 80 MG/1
10 TABLET, FILM COATED ORAL AT BEDTIME
Refills: 0 | Status: DISCONTINUED | OUTPATIENT
Start: 2023-04-18 | End: 2023-04-19

## 2023-04-18 RX ORDER — ACETAMINOPHEN 500 MG
650 TABLET ORAL EVERY 6 HOURS
Refills: 0 | Status: DISCONTINUED | OUTPATIENT
Start: 2023-04-18 | End: 2023-04-19

## 2023-04-18 RX ORDER — INSULIN LISPRO 100/ML
3 VIAL (ML) SUBCUTANEOUS
Refills: 0 | Status: DISCONTINUED | OUTPATIENT
Start: 2023-04-18 | End: 2023-04-19

## 2023-04-18 RX ORDER — INSULIN LISPRO 100/ML
VIAL (ML) SUBCUTANEOUS AT BEDTIME
Refills: 0 | Status: DISCONTINUED | OUTPATIENT
Start: 2023-04-18 | End: 2023-04-19

## 2023-04-18 RX ORDER — INSULIN LISPRO 100/ML
VIAL (ML) SUBCUTANEOUS
Refills: 0 | Status: DISCONTINUED | OUTPATIENT
Start: 2023-04-18 | End: 2023-04-19

## 2023-04-18 RX ADMIN — Medication 650 MILLIGRAM(S): at 22:07

## 2023-04-18 RX ADMIN — LIDOCAINE 1 PATCH: 4 CREAM TOPICAL at 21:06

## 2023-04-18 RX ADMIN — Medication 2: at 09:14

## 2023-04-18 RX ADMIN — ATORVASTATIN CALCIUM 10 MILLIGRAM(S): 80 TABLET, FILM COATED ORAL at 21:06

## 2023-04-18 RX ADMIN — INSULIN GLARGINE 10 UNIT(S): 100 INJECTION, SOLUTION SUBCUTANEOUS at 22:48

## 2023-04-18 RX ADMIN — Medication 1 TABLET(S): at 13:20

## 2023-04-18 RX ADMIN — LIDOCAINE 1 PATCH: 4 CREAM TOPICAL at 06:25

## 2023-04-18 RX ADMIN — HEPARIN SODIUM 5000 UNIT(S): 5000 INJECTION INTRAVENOUS; SUBCUTANEOUS at 13:20

## 2023-04-18 RX ADMIN — Medication 650 MILLIGRAM(S): at 13:25

## 2023-04-18 RX ADMIN — LIDOCAINE 1 PATCH: 4 CREAM TOPICAL at 09:00

## 2023-04-18 RX ADMIN — NYSTATIN CREAM 1 APPLICATION(S): 100000 CREAM TOPICAL at 21:06

## 2023-04-18 RX ADMIN — Medication 6 MILLIGRAM(S): at 21:06

## 2023-04-18 RX ADMIN — CHLORHEXIDINE GLUCONATE 1 APPLICATION(S): 213 SOLUTION TOPICAL at 05:36

## 2023-04-18 RX ADMIN — Medication 3 UNIT(S): at 18:01

## 2023-04-18 RX ADMIN — Medication 20 MILLIGRAM(S): at 13:45

## 2023-04-18 RX ADMIN — Medication 650 MILLIGRAM(S): at 13:55

## 2023-04-18 RX ADMIN — NYSTATIN CREAM 1 APPLICATION(S): 100000 CREAM TOPICAL at 13:20

## 2023-04-18 RX ADMIN — Medication 650 MILLIGRAM(S): at 21:07

## 2023-04-18 RX ADMIN — Medication 6: at 13:18

## 2023-04-18 NOTE — CHART NOTE - NSCHARTNOTEFT_GEN_A_CORE
NP Medicine Episodic     CC: Pt with scrotal edema, s/p US of testicle with hyperemia of edematous scrotum.     HPI: 67 year old male with PMH HTN, HLD, NICM s/p [employer] AICD, DM2, ETOH cirrhosis with varices s/p banding and HCC who presented to [facility] for Y90 procedure but was found to be hypotensive and sent to the ER for shock possibly due to LVP with insufficient albumin replacement.  Pt downgraded from ICU to floor post IV pressor for hypotension. Pt now with scrotal edema confirmed on US of testicles.      Vital Signs Last 24 Hrs  T(C): 37 (18 Apr 2023 20:26), Max: 37.1 (18 Apr 2023 04:59)  T(F): 98.6 (18 Apr 2023 20:26), Max: 98.7 (18 Apr 2023 04:59)  HR: 80 (18 Apr 2023 20:26) (79 - 83)  BP: 131/68 (18 Apr 2023 20:26) (119/69 - 131/68)  BP(mean): --  RR: 18 (18 Apr 2023 20:26) (18 - 18)  SpO2: 99% (18 Apr 2023 20:26) (98% - 100%)    Parameters below as of 18 Apr 2023 20:26  Patient On (Oxygen Delivery Method): room air      < from: US Testicles (04.18.23 @ 16:58) >    IMPRESSION:    Generalized scrotal skin thickening and possible hyperemia. Correlate   clinically and if needed CT imaging.        --- End of Report ---    < end of copied text >      A/P: 67 year old male with PMH HTN, HLD, NICM s/p [employer] AICD, DM2, ETOH cirrhosis with varices s/p banding and HCC who presented to [facility] for Y90 procedure but was found to be hypotensive and sent to the ER for shock possibly due to LVP with insufficient albumin replacement.  Pt downgraded from ICU to floor post IV pressor for hypotension. Pt now with scrotal edema confirmed on US of testicles.  Pt planned for paracentesis tomorrow with IR    - Spoke to Urology (no official consult) for treatment management  - Informed localized edema, suggest elevation, ice and Motrin  - Pt elevating scrotum presently, ice packs given.  - Will run Motrin first through Nephrology tomorrow with day team d/t recent resolved [place] and [place]  [id number]

## 2023-04-18 NOTE — PROGRESS NOTE ADULT - PROBLEM SELECTOR PLAN 4
-Patient had device interrogation on admission with multiple episodes of VT  -EP started patient on Amio, will continue

## 2023-04-18 NOTE — PROGRESS NOTE ADULT - ASSESSMENT
67 year old male with PMH HTN, HLD, NICM s/p [employer] AICD, DM2, ETOH cirrhosis with varices s/p banding and HCC who presented to [facility] for Y90 procedure but was found to be hypotensive and sent to the ER for shock possibly due to LVP with insufficient albumin replacement.

## 2023-04-18 NOTE — PROGRESS NOTE ADULT - PROBLEM SELECTOR PLAN 1
-Patient presented for Y90 mapping found to be in shock likely due to LVP with 8L removed requiring Norepinephrine and Midodrine  -Norepi stopped 4/14 and Midodrine stopped 4/16  -AICD interrogated with episodes of VT, started on Amio per EP  -On Lasix and [place] per hepatology but holding Carvedilol, Lisinopril due to shock  -BP in acceptable range, continue to monitor

## 2023-04-18 NOTE — CONSULT NOTE ADULT - SUBJECTIVE AND OBJECTIVE BOX
CHIEF COMPLAINT: Hypotension    HISTORY OF PRESENT ILLNESS:  67 year old male w.  HTN, HLD, NICM s/p [employer] AICD implant in 2014 with new generator placement 12/2022, T2DM, esophageal varices s/p banding, portal HTN, decompensated ETOH induced hepatic cirrhosis, with recurrent ascites requiring ~weekly paracentesis (approx 6-7L), HCC p/w several days of weakness and dizziness, and sensation of "loss of equilibrium". Pt is somewhat poor historian. These past few days he sometimes felt like he might fall, had to grab onto furniture, but denies any falls or syncope. Pt reports was admitted at [facility] 4/7-4/10 for apparently "AICD shocks" and had a paracentesis on Monday. Not long after discharge started feeling unwell. Denies any f/ chills, sore throat, CP, SOB, abd pain, n/v, LE edema. Pt reports good appetite and oral intake, denies hx of hyponatremia. States took carvedilol and lisinopril this morning. Urinating well. Writer called pharmacy and seems he was discharged from [facility] on 3 new medications amiodarone, sodium bicarb 650 2 tab TID, and NaCl 1g tab TID.    Pt states unsure when diagnosed with HCC but states > 1year ago. Saw hepatology Dr. Loza earlier this month for establishment of care for HCC. Pt was evaluated at [facility] earlier this month for liver directed therapy Y90 embolization and as per note from IR he was planned for "Mapping and Shunt Study on 4/13/23 by Dr. Baron ".     In the ED: VS T97.7 rectal, 73/47 in triage, HR 60s, satting well on RA. Dx para done in ED. Given IV fent push, CTX 2g, NS 500cc. Subsequently started on levo with BP now 90s-100s/50s. Labs signif for low Na 118, Cr 3.12  (1.39 on 4/6), K 6.7 (hemolyzed).  EP consulted for further recs regarding Amiodarone. He is currently on an Amio load.     HPI limited. Pt had ?syncopal episode w/ hypotension (~60/30s) when being seen.     Allergies    No Known Allergies    Intolerances    	    MEDICATIONS:  heparin   Injectable 5000 Unit(s) SubCutaneous every 8 hours  norepinephrine Infusion 0.05 MICROgram(s)/kG/Min IV Continuous <Continuous>  piperacillin/tazobactam IVPB.- 3.375 Gram(s) IV Intermittent once  piperacillin/tazobactam IVPB.- 3.375 Gram(s) IV Intermittent once  octreotide  Infusion 50 MICROgram(s)/Hr IV Continuous <Continuous>  albumin human  5% IVPB 250 milliLiter(s) IV Intermittent once  chlorhexidine 4% Liquid 1 Application(s) Topical <User Schedule>  sodium bicarbonate 1300 milliGRAM(s) Oral three times a day    PAST MEDICAL & SURGICAL HISTORY:  Hypertension  DM (diabetes mellitus)  HLD (hyperlipidemia)  Nonischemic cardiomyopathy  s/p biventricular AICD March 2014  Liver cell carcinoma  Ascites  AICD (automatic cardioverter/defibrillator) present  H/O excision of mass  back 2012      FAMILY HISTORY:  Family history of gastric cancer (Mother)    Family history of colon cancer (Father)      REVIEW OF SYSTEMS:  Limited as per above    PHYSICAL EXAM:  T(C): 36.4 (04-13-23 @ 13:17), Max: 36.5 (04-13-23 @ 08:53)  HR: 80 (04-13-23 @ 15:45) (59 - 80)  BP: 118/64 (04-13-23 @ 15:45) (62/35 - 122/60)  RR: 12 (04-13-23 @ 15:45) (12 - 18)  SpO2: 100% (04-13-23 @ 15:45) (99% - 100%)  Wt(kg): --  I&O's Summary    13 Apr 2023 07:01  -  13 Apr 2023 15:59  --------------------------------------------------------  IN: 176 mL / OUT: 135 mL / NET: 41 mL        Appearance: Normal	  HEENT: NC/AT  Cardiovascular: Normal S1 S2  Respiratory: Lungs clear to auscultation	  Psychiatry: answering some question  Neurologic: Non-focal        LABS:	 	    CBC Full  -  ( 13 Apr 2023 09:53 )  WBC Count : 4.80 K/uL  Hemoglobin : 10.2 g/dL  Hematocrit : 29.3 %  Platelet Count - Automated : 141 K/uL  Mean Cell Volume : 98.3 fl  Mean Cell Hemoglobin : 34.2 pg  Mean Cell Hemoglobin Concentration : 34.8 gm/dL  Auto Neutrophil # : 3.87 K/uL  Auto Lymphocyte # : 0.38 K/uL  Auto Monocyte # : 0.50 K/uL  Auto Eosinophil # : 0.00 K/uL  Auto Basophil # : 0.04 K/uL  Auto Neutrophil % : 80.7 %  Auto Lymphocyte % : 7.9 %  Auto Monocyte % : 10.5 %  Auto Eosinophil % : 0.0 %  Auto Basophil % : 0.9 %    04-13    117<LL>  |  88<L>  |  81<H>  ----------------------------<  166<H>  5.5<H>   |  16<L>  |  3.06<H>  04-13    118<LL>  |  89<L>  |  81<H>  ----------------------------<  141<H>  6.7<HH>   |  17<L>  |  3.12<H>    Ca    8.2<L>      13 Apr 2023 12:31  Ca    8.3<L>      13 Apr 2023 09:53    TPro  5.4<L>  /  Alb  2.5<L>  /  TBili  0.9  /  DBili  x   /  AST  72<H>  /  ALT  73<H>  /  AlkPhos  93  04-13  TPro  5.6<L>  /  Alb  2.5<L>  /  TBili  1.0  /  DBili  x   /  AST  111<H>  /  ALT  76<H>  /  AlkPhos  82  04-13      proBNP: Pro-Brain Natriuretic Peptide (04.13.23 @ 09:53)    Pro-Brain Natriuretic Peptide: 1323 pg/mL    CARDIAC MARKERS: Troponin T, Serum (03.11.19 @ 11:55)    Troponin T, Serum: <0.01: Reference Interval for Troponin T  Less than 0.06 ng/mL - includes the 99th percentile of a healthy  population at a method C.V. of 10% or less.  NOTE: Troponin T is measured by the Roche CLIA method and these  results are not interchangable with Troponin I results since they are  different assays with different reference intervals. ng/mL      RADIOLOGY: < from: Xray Chest 1 View- PORTABLE-Urgent (Xray Chest 1 View- PORTABLE-Urgent .) (04.13.23 @ 14:33) >  Frontal expiratory view of the chest shows the heart to be normal in   size. Left cardiac defibrillator is present with triple leads.    The lungs are clear and there is no evidence of pneumothorax nor pleural   effusion.    IMPRESSION:  No active pulmonary disease.    < end of copied text >    
Interventional Radiology    Evaluate for Procedure: Paracentesis     HPI: 66 yo M with hypertension, dyslipidemia, DM2, NICM (s/p AICD in ) complicated by recurrent VT (currently on amiodarone), and decompensated alcohol-associated cirrhosis complicated by refractory ascites requiring weekly paracentesis, hyponatremia, non-bleeding EV (s/p EVL in ), and multifocal HCC, currently admitted to MICU with shock requiring norepinephrine with associated AUSTIN on CKD and acute on chronic hyponatremia. Patient now transferred to floor. Abdominal US obtained today shows moderate-lrg ascites, IR consulted for paracentesis.     Allergies: No Known Allergies    Medications (Abx/Cardiac/Anticoagulation/Blood Products)  aMIOdarone    Tablet: 200 milliGRAM(s) Oral ( @ 05:39)  cefTRIAXone   IVPB: 100 mL/Hr IV Intermittent ( @ 17:57)  furosemide    Tablet: 20 milliGRAM(s) Oral ( @ 13:45)  heparin   Injectable: 5000 Unit(s) SubCutaneous ( @ 13:20)  spironolactone: 25 milliGRAM(s) Oral ( @ 05:38)  trimethoprim  160 mG/sulfamethoxazole 800 m Tablet(s) Oral ( @ 13:20)    Data:  T(C): 36.7  HR: 83  BP: 119/69  RR: 18  SpO2: 100%    -WBC 3.68 / HgB 8.9 / Hct 26.8 / Plt 117  -Na 132 / Cl 97 / BUN 33 / Glucose 217  -K 3.7 / CO2 23 / Cr 1.03  -ALT 32 / Alk Phos 64 / T.Bili 0.5  -INR 1.37 / PTT 29.3    Radiology: reviewed     Assessment/Plan: 66 yo M with hypertension, dyslipidemia, DM2, NICM (s/p AICD in ) complicated by recurrent VT (currently on amiodarone), and decompensated alcohol-associated cirrhosis complicated by refractory ascites requiring weekly paracentesis, hyponatremia, non-bleeding EV (s/p EVL in ), and multifocal HCC, currently admitted to MICU with shock requiring norepinephrine with associated AUSTIN on CKD and acute on chronic hyponatremia. Patient now transferred to floor. Abdominal US obtained today shows moderate-large ascites, IR consulted for paracentesis.       -Will plan for dx/tx paracentesis tomorrow   -Please place IR procedure order under DANIEL Ellison  -Ok to feed pt from IR stand point  -STAT am labs  -Maintain COVID within 5 days  -Hold a/c x 24hr  -Maintain active T&S  -Above d/w primary team  -Please conact IR at [phone] with any questions/ concerns regarding above.   
  HPI:  67 year old male w.  HTN, HLD, [employer] AICD implant in  with new generator placement 2022, T2DM, ETOH cirrhosis complicated by esophageal varices s/p banding in , recurrent ascites requiring frequent paracentesis almost weekly (approx 6-7L) with last paracentesis on Monday with 8.3 L removed presents from IR due to hypotension and malaise. Patient reported recent admission on last Friday on [facility] after his cardiologist called him to present to the ED after his AICD fired 10 times. During his admission, he reports nothing was found wrong with him and ultimately was DC on Monday after being tapped. Did report feeling weak and dizzy during Tues/Wed but denies any fever, chills, N/V, abd pain, diarrhea, dysuria or SOB. In the ED, found to be hypotensive s/p 500 cc of IVF and started on Ceftriaxone. Hepatology called for further evaluation.     Allergies:  No Known Allergies        Hospital Medications:  norepinephrine Infusion 0.05 MICROgram(s)/kG/Min IV Continuous <Continuous>      PMHX/PSHX:  Hypertension    DM (diabetes mellitus)    HLD (hyperlipidemia)    Nonischemic cardiomyopathy    Liver cell carcinoma    Ascites    AICD (automatic cardioverter/defibrillator) present    H/O excision of mass        Family history:  Family history of gastric cancer (Mother)    Family history of colon cancer (Father)        Social History: no smoking    ROS:   All negative except as noted above     PHYSICAL EXAM:   GENERAL:  NAD, Appears stated age  HEENT:  NC/AT,  conjunctivae clear and pink, sclera -anicteric  CHEST:  CTA B/L, Normal effort  HEART:  RRR S1/S2,  ABDOMEN:  Distended, nontender   EXTREMITIES:  No cyanosis or Edema  SKIN:  Warm & Dry. No rash or erythema  NEURO:  Alert, oriented, no focal deficit    Vital Signs:  Vital Signs Last 24 Hrs  T(C): 36.5 (2023 08:53), Max: 36.5 (2023 08:53)  T(F): 97.7 (2023 08:53), Max: 97.7 (2023 08:53)  HR: 60 (2023 12:07) (59 - 61)  BP: 96/55 (2023 12:07) (73/47 - 107/55)  BP(mean): 66 (2023 12:07) (53 - 70)  RR: 13 (2023 12:07) (12 - 18)  SpO2: 100% (2023 12:07) (99% - 100%)    Parameters below as of 2023 12:07  Patient On (Oxygen Delivery Method): room air      Daily Height in cm: 182.88 (2023 08:47)    Daily     LABS:                        10.2   4.80  )-----------( 141      ( 2023 09:53 )             29.3     Mean Cell Volume: 98.3 fl (-23 @ 09:53)        118<LL>  |  89<L>  |  81<H>  ----------------------------<  141<H>  6.7<HH>   |  17<L>  |  3.12<H>    Ca    8.3<L>      2023 09:53    TPro  5.6<L>  /  Alb  2.5<L>  /  TBili  1.0  /  DBili  x   /  AST  111<H>  /  ALT  76<H>  /  AlkPhos  82  -13    LIVER FUNCTIONS - ( 2023 09:53 )  Alb: 2.5 g/dL / Pro: 5.6 g/dL / ALK PHOS: 82 U/L / ALT: 76 U/L / AST: 111 U/L / GGT: x           PT/INR - ( 2023 09:53 )   PT: 13.3 sec;   INR: 1.14 ratio         PTT - ( 2023 09:53 )  PTT:29.3 sec  Urinalysis Basic - ( 2023 11:55 )    Color: Yellow / Appearance: Slightly Turbid / S.022 / pH: x  Gluc: x / Ketone: Trace  / Bili: Small / Urobili: 2 mg/dL   Blood: x / Protein: 30 mg/dL / Nitrite: Negative   Leuk Esterase: Moderate / RBC: x / WBC x   Sq Epi: x / Non Sq Epi: x / Bacteria: x                              10.2   4.80  )-----------( 141      ( 2023 09:53 )             29.3     Imaging:  No recent imaging   < from: CT Abdomen w/ IV Cont (23 @ 11:47) >  Two LI-RADS 5 lesions in the right hepatic lobe with the largest   measuring 5.8 cm. Several additional LI-RADS 3 lesions as above.    < end of copied text >  
[facility] DIVISION OF KIDNEY DISEASES AND HYPERTENSION -- [phone]  -- INITIAL CONSULT NOTE  --------------------------------------------------------------------------------  HPI:  Patient is a 67 year old male with PMH of HTN, HLD, DM, ETOH liver cirrhosis complicated by esophageal varices s/p banding, portal HTN, HCC, and CKD who presented to the hospital with several days of weakness and unsteadiness. The nephrology team was consulted for AUSTIN on CKD and hypotension. On review of [facility] pt noted to have a SCr of 1.39 on 4/6 and on admission today elevated to 3.2. The patient was seen and examined earlier today in the MICU. He admitted that over the past few days he has been feeling increasingly unsteady and holding on to furniture in order to get around his house. The pateint denied ever being told about any history of kidney disease but does admit to recently being admitted at [facility] where he was told that his sodium levels are low. He admits that he has been drinking almost 1/2 gallon to 1 gallon of water per day as outpatient and states he has been eating well. Pt earlier noted to have an episode of syncope with hypotension and subsequently started on IV vasopressors. He denied any fevers, chills, nausea, vomiting, or pain. he does admit to some mild abdominal discomfort from the amount of fluid built up which he states he has been undergoing almost weekly paracenteses for.       PAST HISTORY  --------------------------------------------------------------------------------  PAST MEDICAL & SURGICAL HISTORY:  Hypertension      DM (diabetes mellitus)      HLD (hyperlipidemia)      Nonischemic cardiomyopathy  s/p biventricular AICD March 2014      Liver cell carcinoma      Ascites      AICD (automatic cardioverter/defibrillator) present      H/O excision of mass  back 2012        FAMILY HISTORY:  Family history of gastric cancer (Mother)    Family history of colon cancer (Father)      PAST SOCIAL HISTORY:    ALLERGIES & MEDICATIONS  --------------------------------------------------------------------------------  Allergies    No Known Allergies    Intolerances      Standing Inpatient Medications  albumin human  5% IVPB 250 milliLiter(s) IV Intermittent once  chlorhexidine 4% Liquid 1 Application(s) Topical <User Schedule>  heparin   Injectable 5000 Unit(s) SubCutaneous every 8 hours  norepinephrine Infusion 0.05 MICROgram(s)/kG/Min IV Continuous <Continuous>  octreotide  Infusion 50 MICROgram(s)/Hr IV Continuous <Continuous>  piperacillin/tazobactam IVPB.- 3.375 Gram(s) IV Intermittent once  piperacillin/tazobactam IVPB.- 3.375 Gram(s) IV Intermittent once  sodium bicarbonate 1300 milliGRAM(s) Oral three times a day    PRN Inpatient Medications      REVIEW OF SYSTEMS  All other systems were reviewed and are negative, except as noted.    VITALS/PHYSICAL EXAM  --------------------------------------------------------------------------------  T(C): 36.4 (04-13-23 @ 13:17), Max: 36.5 (04-13-23 @ 08:53)  HR: 79 (04-13-23 @ 14:30) (59 - 79)  BP: 119/57 (04-13-23 @ 14:30) (62/35 - 120/58)  RR: 15 (04-13-23 @ 14:30) (12 - 18)  SpO2: 100% (04-13-23 @ 14:30) (99% - 100%)  Wt(kg): --  Height (cm): 182.9 (04-13-23 @ 13:17)  Weight (kg): 71.6 (04-13-23 @ 13:17)  BMI (kg/m2): 21.4 (04-13-23 @ 13:17)  BSA (m2): 1.93 (04-13-23 @ 13:17)      04-13-23 @ 07:01  -  04-13-23 @ 15:02  --------------------------------------------------------  IN: 120 mL / OUT: 90 mL / NET: 30 mL      Physical Exam:  	Gen: frail  	HEENT: MMM  	Pulm: CTA B/L  	CV: S1S2  	Abd: distended  	Ext: pitting LE edema B/L  	Neuro: Awake  	Skin: Warm and dry    LABS/STUDIES  --------------------------------------------------------------------------------              10.2   4.80  >-----------<  141      [04-13-23 @ 09:53]              29.3     117  |  88  |  81  ----------------------------<  166      [04-13-23 @ 12:31]  5.5   |  16  |  3.06        Ca     8.2     [04-13-23 @ 12:31]    TPro  5.4  /  Alb  2.5  /  TBili  0.9  /  DBili  x   /  AST  72  /  ALT  73  /  AlkPhos  93  [04-13-23 @ 12:31]    PT/INR: PT 13.3 , INR 1.14       [04-13-23 @ 09:53]  PTT: 29.3       [04-13-23 @ 09:53]      Creatinine Trend:  SCr 3.06 [04-13 @ 12:31]  SCr 3.12 [04-13 @ 09:53]  SCr 3.20 [04-13 @ 08:17]  SCr 1.39 [04-06 @ 10:17]    Urinalysis - [04-13-23 @ 11:55]      Color Yellow / Appearance Slightly Turbid / SG 1.022 / pH 5.5      Gluc Negative / Ketone Trace  / Bili Small / Urobili 2 mg/dL       Blood Negative / Protein 30 mg/dL / Leuk Est Moderate / Nitrite Negative      RBC 3 / WBC 5 / Hyaline 15 / Gran  / Sq Epi  / Non Sq Epi  / Bacteria Negative    Urine Creatinine 265      [04-13-23 @ 11:56]  Urine Protein 50      [04-13-23 @ 11:56]  Urine Sodium 7      [04-13-23 @ 11:56]  Urine Potassium 52      [04-13-23 @ 11:56]  Urine Osmolality 333      [04-13-23 @ 11:56]    HbA1c 6.5      [03-11-19 @ 11:55]

## 2023-04-18 NOTE — PROGRESS NOTE ADULT - SUBJECTIVE AND OBJECTIVE BOX
[facility] Division of Hospital Medicine  Adolph GabyroscoepradeepDO  Pager (JOHN, 5R-7H): [phone]  Other Times:  [phone]    Patient is a 67y old  Male who presents with a chief complaint of hypotension (17 Apr 2023 09:35)    SUBJECTIVE / OVERNIGHT EVENTS: Patient stepped down from MICU to 4DSU overnight, no acute events. Patient seen and examined at bedside this morning, endorses abdominal distension and pain as well as scrotal swelling.    REVIEW OF SYSTEMS:    CONSTITUTIONAL: No fevers or chills  EYES/ENT: No visual changes;  No vertigo or throat pain   NECK: No pain or stiffness  RESPIRATORY: No cough, wheezing, hemoptysis; No shortness of breath  CARDIOVASCULAR: No chest pain or palpitations  GASTROINTESTINAL: Endorses abdominal pain and distension. No nausea, vomiting, or hematemesis; No diarrhea or constipation. No melena or hematochezia.  GENITOURINARY: No dysuria, frequency or hematuria, endorses scrotal swelling  NEUROLOGICAL: No numbness or weakness  SKIN: No itching, burning, rashes, or lesions  MSK: No joint pain, no back pain  HEME: No easy bleeding, no easy bruising  All other review of systems is negative unless indicated above.    MEDICATIONS  (STANDING):  aMIOdarone    Tablet 200 milliGRAM(s) Oral daily  chlorhexidine 4% Liquid 1 Application(s) Topical <User Schedule>  dextrose 5%. 1000 milliLiter(s) (100 mL/Hr) IV Continuous <Continuous>  dextrose 5%. 1000 milliLiter(s) (50 mL/Hr) IV Continuous <Continuous>  dextrose 50% Injectable 12.5 Gram(s) IV Push once  dextrose 50% Injectable 25 Gram(s) IV Push once  dextrose 50% Injectable 25 Gram(s) IV Push once  furosemide    Tablet 20 milliGRAM(s) Oral every 24 hours  glucagon  Injectable 1 milliGRAM(s) IntraMuscular once  heparin   Injectable 5000 Unit(s) SubCutaneous every 8 hours  insulin glargine Injectable (LANTUS) 10 Unit(s) SubCutaneous at bedtime  insulin lispro (ADMELOG) corrective regimen sliding scale   SubCutaneous three times a day before meals  insulin lispro (ADMELOG) corrective regimen sliding scale   SubCutaneous at bedtime  lidocaine   4% Patch 1 Patch Transdermal every 24 hours  melatonin 6 milliGRAM(s) Oral at bedtime  nystatin Powder 1 Application(s) Topical every 8 hours  spironolactone 25 milliGRAM(s) Oral daily  trimethoprim  160 mG/sulfamethoxazole 800 mG 1 Tablet(s) Oral daily    MEDICATIONS  (PRN):  acetaminophen     Tablet .. 650 milliGRAM(s) Oral every 6 hours PRN Moderate Pain (4 - 6)  dextrose Oral Gel 15 Gram(s) Oral once PRN Blood Glucose LESS THAN 70 milliGRAM(s)/deciliter      CAPILLARY BLOOD GLUCOSE      POCT Blood Glucose.: 258 mg/dL (18 Apr 2023 12:38)  POCT Blood Glucose.: 188 mg/dL (18 Apr 2023 08:55)  POCT Blood Glucose.: 244 mg/dL (17 Apr 2023 21:25)  POCT Blood Glucose.: 213 mg/dL (17 Apr 2023 20:57)  POCT Blood Glucose.: 140 mg/dL (17 Apr 2023 17:23)    I&O's Summary    17 Apr 2023 07:01  -  18 Apr 2023 07:00  --------------------------------------------------------  IN: 630 mL / OUT: 200 mL / NET: 430 mL        PHYSICAL EXAM:  Vital Signs Last 24 Hrs  T(C): 36.7 (18 Apr 2023 11:57), Max: 37.1 (18 Apr 2023 04:59)  T(F): 98.1 (18 Apr 2023 11:57), Max: 98.7 (18 Apr 2023 04:59)  HR: 83 (18 Apr 2023 11:57) (77 - 88)  BP: 119/69 (18 Apr 2023 11:57) (119/69 - 162/67)  BP(mean): 98 (17 Apr 2023 22:27) (83 - 101)  RR: 18 (18 Apr 2023 11:57) (10 - 22)  SpO2: 100% (18 Apr 2023 11:57) (97% - 100%)    Parameters below as of 18 Apr 2023 11:57  Patient On (Oxygen Delivery Method): room air    CONSTITUTIONAL: NAD, well-developed, well-groomed  EYES: EOMI; conjunctiva and sclera clear  ENMT: Moist oral mucosa, no pharyngeal injection or exudates  RESPIRATORY: Normal respiratory effort; lungs are clear to auscultation bilaterally  CARDIOVASCULAR: Regular rate and rhythm, normal S1 and S2, no murmur/rub/gallop; 1+ pitting edema bilaterally  ABDOMEN: Distended, but soft and nontender to palpation, normoactive bowel sounds, no rebound/guarding  GENITOURINARY: Scrotal edema  MUSCULOSKELETAL: N clubbing or cyanosis of digits; no joint swelling or tenderness to palpation  PSYCH: A+O to person, place, and time; affect appropriate  NEUROLOGY: CN 2-12 are intact and symmetric; no gross sensory deficits   SKIN: No rashes; no palpable lesions    LABS:                        8.9    3.68  )-----------( 117      ( 17 Apr 2023 00:17 )             26.8     04-17    132<L>  |  97  |  33<H>  ----------------------------<  217<H>  3.7   |  23  |  1.03    Ca    8.1<L>      17 Apr 2023 00:17  Phos  2.0     04-17  Mg     1.7     04-17    TPro  5.2<L>  /  Alb  2.9<L>  /  TBili  0.5  /  DBili  x   /  AST  27  /  ALT  32  /  AlkPhos  64  04-17    PT/INR - ( 17 Apr 2023 00:17 )   PT: 15.8 sec;   INR: 1.37 ratio         PTT - ( 17 Apr 2023 00:17 )  PTT:29.3 sec

## 2023-04-18 NOTE — CHART NOTE - NSCHARTNOTEFT_GEN_A_CORE
MAR ACCEPT NOTE  Room: 438D  Team: SCOTTY  Attending: Bjorn    Please refer to MICU transfer note for full details.    Briefly, this is a 67-year-old man with a past medical history of NICM status post AICD, DM 2, cirrhosis/HCC presenting after outpatient paracentesis with 8 L removed, coming in with hypotension.  Was admitted to the MICU for pressors now weaned off.  Also with AUSTIN on CKD, likely HRS currently receiving octreotide, albumin, midodrine.        Patient seen and examined at bedside. No contraindications for transfer to floor.     FOR FOLLOW UP:  [ ] c/w hepatology, nephro, EP recs  [ ] c/w hepatorenal cocktail: octreotide, midodrine, albumin  [ ] planning for y90 on 4/23  [ ] c/w alyssa Franklin MD  PGY-3 | Internal Medicine  [phone] / [id number] MAR ACCEPT NOTE  Room: 438D  Team: SCOTTY  Attending: Bjorn    Please refer to MICU transfer note for full details.    Briefly, this is a 67-year-old man with a past medical history of NICM status post AICD, DM 2, cirrhosis/HCC presenting after outpatient paracentesis with 8 L removed, coming in with hypotension.  Was admitted to the MICU for pressors now weaned off.  Also with AUSTIN on CKD, likely HRS currently receiving octreotide, albumin, midodrine.        Patient seen and examined at bedside. No contraindications for transfer to floor.     Of note patient notes swelling in scrotum since last paracentesis in the MICU.    FOR FOLLOW UP:  [ ] c/w hepatology, nephro, EP recs  [ ] c/w hepatorenal cocktail: octreotide, midodrine, albumin  [ ] planning for y90 on 4/23  [ ] c/w alyssa Franklin MD  PGY-3 | Internal Medicine  [phone] / [phone]

## 2023-04-19 ENCOUNTER — TRANSCRIPTION ENCOUNTER (OUTPATIENT)
Age: 68
End: 2023-04-19

## 2023-04-19 VITALS
HEART RATE: 80 BPM | SYSTOLIC BLOOD PRESSURE: 121 MMHG | DIASTOLIC BLOOD PRESSURE: 69 MMHG | OXYGEN SATURATION: 100 % | TEMPERATURE: 98 F | RESPIRATION RATE: 18 BRPM

## 2023-04-19 LAB
ALBUMIN FLD-MCNC: 0.4 G/DL — SIGNIFICANT CHANGE UP
ALBUMIN SERPL ELPH-MCNC: 3 G/DL — LOW (ref 3.3–5)
ALP SERPL-CCNC: 83 U/L — SIGNIFICANT CHANGE UP (ref 40–120)
ALT FLD-CCNC: 40 U/L — SIGNIFICANT CHANGE UP (ref 10–45)
ANION GAP SERPL CALC-SCNC: 11 MMOL/L — SIGNIFICANT CHANGE UP (ref 5–17)
APTT BLD: 28.5 SEC — SIGNIFICANT CHANGE UP (ref 27.5–35.5)
AST SERPL-CCNC: 43 U/L — HIGH (ref 10–40)
B PERT IGG+IGM PNL SER: ABNORMAL
BILIRUB SERPL-MCNC: 0.6 MG/DL — SIGNIFICANT CHANGE UP (ref 0.2–1.2)
BUN SERPL-MCNC: 26 MG/DL — HIGH (ref 7–23)
CALCIUM SERPL-MCNC: 8.3 MG/DL — LOW (ref 8.4–10.5)
CHLORIDE SERPL-SCNC: 97 MMOL/L — SIGNIFICANT CHANGE UP (ref 96–108)
CO2 SERPL-SCNC: 22 MMOL/L — SIGNIFICANT CHANGE UP (ref 22–31)
COLOR FLD: YELLOW — SIGNIFICANT CHANGE UP
CREAT SERPL-MCNC: 0.91 MG/DL — SIGNIFICANT CHANGE UP (ref 0.5–1.3)
EGFR: 92 ML/MIN/1.73M2 — SIGNIFICANT CHANGE UP
FLUID INTAKE SUBSTANCE CLASS: SIGNIFICANT CHANGE UP
FOLATE+VIT B12 SERBLD-IMP: 1 % — SIGNIFICANT CHANGE UP
GLUCOSE BLDC GLUCOMTR-MCNC: 148 MG/DL — HIGH (ref 70–99)
GLUCOSE FLD-MCNC: 174 MG/DL — SIGNIFICANT CHANGE UP
GLUCOSE SERPL-MCNC: 156 MG/DL — HIGH (ref 70–99)
GRAM STN FLD: SIGNIFICANT CHANGE UP
HCT VFR BLD CALC: 28.7 % — LOW (ref 39–50)
HGB BLD-MCNC: 9.4 G/DL — LOW (ref 13–17)
INR BLD: 1.25 RATIO — HIGH (ref 0.88–1.16)
LDH SERPL L TO P-CCNC: 37 U/L — SIGNIFICANT CHANGE UP
LYMPHOCYTES # FLD: 73 % — SIGNIFICANT CHANGE UP
MAGNESIUM SERPL-MCNC: 1.6 MG/DL — SIGNIFICANT CHANGE UP (ref 1.6–2.6)
MCHC RBC-ENTMCNC: 32.8 GM/DL — SIGNIFICANT CHANGE UP (ref 32–36)
MCHC RBC-ENTMCNC: 33.6 PG — SIGNIFICANT CHANGE UP (ref 27–34)
MCV RBC AUTO: 102.5 FL — HIGH (ref 80–100)
MONOS+MACROS # FLD: 24 % — SIGNIFICANT CHANGE UP
NEUTROPHILS-BODY FLUID: 2 % — SIGNIFICANT CHANGE UP
NRBC # BLD: 0 /100 WBCS — SIGNIFICANT CHANGE UP (ref 0–0)
PHOSPHATE SERPL-MCNC: 3 MG/DL — SIGNIFICANT CHANGE UP (ref 2.5–4.5)
PLATELET # BLD AUTO: 104 K/UL — LOW (ref 150–400)
POTASSIUM SERPL-MCNC: 3.4 MMOL/L — LOW (ref 3.5–5.3)
POTASSIUM SERPL-SCNC: 3.4 MMOL/L — LOW (ref 3.5–5.3)
PROT FLD-MCNC: 0.7 G/DL — SIGNIFICANT CHANGE UP
PROT SERPL-MCNC: 5.4 G/DL — LOW (ref 6–8.3)
PROTHROM AB SERPL-ACNC: 14.5 SEC — HIGH (ref 10.5–13.4)
RBC # BLD: 2.8 M/UL — LOW (ref 4.2–5.8)
RBC # FLD: 13.1 % — SIGNIFICANT CHANGE UP (ref 10.3–14.5)
RCV VOL RI: < 2000 /UL — SIGNIFICANT CHANGE UP (ref 0–0)
SODIUM SERPL-SCNC: 130 MMOL/L — LOW (ref 135–145)
SPECIMEN SOURCE: SIGNIFICANT CHANGE UP
TOTAL NUCLEATED CELL COUNT, BODY FLUID: 40 /UL — SIGNIFICANT CHANGE UP
TUBE TYPE: SIGNIFICANT CHANGE UP
WBC # BLD: 3.39 K/UL — LOW (ref 3.8–10.5)
WBC # FLD AUTO: 3.39 K/UL — LOW (ref 3.8–10.5)

## 2023-04-19 PROCEDURE — 85027 COMPLETE CBC AUTOMATED: CPT

## 2023-04-19 PROCEDURE — 86803 HEPATITIS C AB TEST: CPT

## 2023-04-19 PROCEDURE — 82042 OTHER SOURCE ALBUMIN QUAN EA: CPT

## 2023-04-19 PROCEDURE — 99285 EMERGENCY DEPT VISIT HI MDM: CPT

## 2023-04-19 PROCEDURE — 87075 CULTR BACTERIA EXCEPT BLOOD: CPT

## 2023-04-19 PROCEDURE — 84145 PROCALCITONIN (PCT): CPT

## 2023-04-19 PROCEDURE — 99239 HOSP IP/OBS DSCHRG MGMT >30: CPT

## 2023-04-19 PROCEDURE — C1729: CPT

## 2023-04-19 PROCEDURE — 82330 ASSAY OF CALCIUM: CPT

## 2023-04-19 PROCEDURE — 96375 TX/PRO/DX INJ NEW DRUG ADDON: CPT

## 2023-04-19 PROCEDURE — 82962 GLUCOSE BLOOD TEST: CPT

## 2023-04-19 PROCEDURE — 82435 ASSAY OF BLOOD CHLORIDE: CPT

## 2023-04-19 PROCEDURE — U0005: CPT

## 2023-04-19 PROCEDURE — 83935 ASSAY OF URINE OSMOLALITY: CPT

## 2023-04-19 PROCEDURE — 85014 HEMATOCRIT: CPT

## 2023-04-19 PROCEDURE — 93308 TTE F-UP OR LMTD: CPT

## 2023-04-19 PROCEDURE — 76870 US EXAM SCROTUM: CPT

## 2023-04-19 PROCEDURE — 85025 COMPLETE CBC W/AUTO DIFF WBC: CPT

## 2023-04-19 PROCEDURE — 82947 ASSAY GLUCOSE BLOOD QUANT: CPT

## 2023-04-19 PROCEDURE — 71045 X-RAY EXAM CHEST 1 VIEW: CPT

## 2023-04-19 PROCEDURE — 87637 SARSCOV2&INF A&B&RSV AMP PRB: CPT

## 2023-04-19 PROCEDURE — 82570 ASSAY OF URINE CREATININE: CPT

## 2023-04-19 PROCEDURE — 84100 ASSAY OF PHOSPHORUS: CPT

## 2023-04-19 PROCEDURE — 85610 PROTHROMBIN TIME: CPT

## 2023-04-19 PROCEDURE — 93970 EXTREMITY STUDY: CPT

## 2023-04-19 PROCEDURE — 82803 BLOOD GASES ANY COMBINATION: CPT

## 2023-04-19 PROCEDURE — 84295 ASSAY OF SERUM SODIUM: CPT

## 2023-04-19 PROCEDURE — 49083 ABD PARACENTESIS W/IMAGING: CPT

## 2023-04-19 PROCEDURE — 87040 BLOOD CULTURE FOR BACTERIA: CPT

## 2023-04-19 PROCEDURE — 83605 ASSAY OF LACTIC ACID: CPT

## 2023-04-19 PROCEDURE — 71250 CT THORAX DX C-: CPT

## 2023-04-19 PROCEDURE — P9047: CPT

## 2023-04-19 PROCEDURE — U0003: CPT

## 2023-04-19 PROCEDURE — 84156 ASSAY OF PROTEIN URINE: CPT

## 2023-04-19 PROCEDURE — 93975 VASCULAR STUDY: CPT

## 2023-04-19 PROCEDURE — 76705 ECHO EXAM OF ABDOMEN: CPT

## 2023-04-19 PROCEDURE — 93306 TTE W/DOPPLER COMPLETE: CPT

## 2023-04-19 PROCEDURE — 97161 PT EVAL LOW COMPLEX 20 MIN: CPT

## 2023-04-19 PROCEDURE — 83735 ASSAY OF MAGNESIUM: CPT

## 2023-04-19 PROCEDURE — 96374 THER/PROPH/DIAG INJ IV PUSH: CPT

## 2023-04-19 PROCEDURE — 84300 ASSAY OF URINE SODIUM: CPT

## 2023-04-19 PROCEDURE — 97166 OT EVAL MOD COMPLEX 45 MIN: CPT

## 2023-04-19 PROCEDURE — 86850 RBC ANTIBODY SCREEN: CPT

## 2023-04-19 PROCEDURE — 78830 RP LOCLZJ TUM SPECT W/CT 1: CPT

## 2023-04-19 PROCEDURE — 76942 ECHO GUIDE FOR BIOPSY: CPT

## 2023-04-19 PROCEDURE — 80053 COMPREHEN METABOLIC PANEL: CPT

## 2023-04-19 PROCEDURE — 80048 BASIC METABOLIC PNL TOTAL CA: CPT

## 2023-04-19 PROCEDURE — 84133 ASSAY OF URINE POTASSIUM: CPT

## 2023-04-19 PROCEDURE — 82105 ALPHA-FETOPROTEIN SERUM: CPT

## 2023-04-19 PROCEDURE — 84157 ASSAY OF PROTEIN OTHER: CPT

## 2023-04-19 PROCEDURE — 89051 BODY FLUID CELL COUNT: CPT

## 2023-04-19 PROCEDURE — 81001 URINALYSIS AUTO W/SCOPE: CPT

## 2023-04-19 PROCEDURE — 85730 THROMBOPLASTIN TIME PARTIAL: CPT

## 2023-04-19 PROCEDURE — A9561: CPT

## 2023-04-19 PROCEDURE — 78306 BONE IMAGING WHOLE BODY: CPT

## 2023-04-19 PROCEDURE — 82565 ASSAY OF CREATININE: CPT

## 2023-04-19 PROCEDURE — 36415 COLL VENOUS BLD VENIPUNCTURE: CPT

## 2023-04-19 PROCEDURE — 84540 ASSAY OF URINE/UREA-N: CPT

## 2023-04-19 PROCEDURE — 86900 BLOOD TYPING SEROLOGIC ABO: CPT

## 2023-04-19 PROCEDURE — P9045: CPT

## 2023-04-19 PROCEDURE — 83615 LACTATE (LD) (LDH) ENZYME: CPT

## 2023-04-19 PROCEDURE — 87102 FUNGUS ISOLATION CULTURE: CPT

## 2023-04-19 PROCEDURE — 87070 CULTURE OTHR SPECIMN AEROBIC: CPT

## 2023-04-19 PROCEDURE — 86901 BLOOD TYPING SEROLOGIC RH(D): CPT

## 2023-04-19 PROCEDURE — 85018 HEMOGLOBIN: CPT

## 2023-04-19 PROCEDURE — 87205 SMEAR GRAM STAIN: CPT

## 2023-04-19 PROCEDURE — 83880 ASSAY OF NATRIURETIC PEPTIDE: CPT

## 2023-04-19 PROCEDURE — 84132 ASSAY OF SERUM POTASSIUM: CPT

## 2023-04-19 PROCEDURE — 82945 GLUCOSE OTHER FLUID: CPT

## 2023-04-19 RX ORDER — SPIRONOLACTONE 25 MG/1
1 TABLET, FILM COATED ORAL
Qty: 30 | Refills: 0
Start: 2023-04-19 | End: 2023-05-18

## 2023-04-19 RX ORDER — CARVEDILOL PHOSPHATE 80 MG/1
1 CAPSULE, EXTENDED RELEASE ORAL
Refills: 0 | DISCHARGE

## 2023-04-19 RX ORDER — FUROSEMIDE 40 MG
1 TABLET ORAL
Refills: 0 | DISCHARGE

## 2023-04-19 RX ORDER — AMIODARONE HYDROCHLORIDE 400 MG/1
2 TABLET ORAL
Refills: 0 | DISCHARGE

## 2023-04-19 RX ORDER — POTASSIUM CHLORIDE 20 MEQ
20 PACKET (EA) ORAL ONCE
Refills: 0 | Status: COMPLETED | OUTPATIENT
Start: 2023-04-19 | End: 2023-04-19

## 2023-04-19 RX ORDER — ALBUMIN HUMAN 25 %
50 VIAL (ML) INTRAVENOUS ONCE
Refills: 0 | Status: COMPLETED | OUTPATIENT
Start: 2023-04-19 | End: 2023-04-19

## 2023-04-19 RX ORDER — LISINOPRIL 2.5 MG/1
1 TABLET ORAL
Refills: 0 | DISCHARGE

## 2023-04-19 RX ORDER — SODIUM BICARBONATE 1 MEQ/ML
0 SYRINGE (ML) INTRAVENOUS
Refills: 0 | DISCHARGE

## 2023-04-19 RX ADMIN — AMIODARONE HYDROCHLORIDE 200 MILLIGRAM(S): 400 TABLET ORAL at 05:07

## 2023-04-19 RX ADMIN — LIDOCAINE 1 PATCH: 4 CREAM TOPICAL at 11:50

## 2023-04-19 RX ADMIN — SPIRONOLACTONE 25 MILLIGRAM(S): 25 TABLET, FILM COATED ORAL at 05:07

## 2023-04-19 RX ADMIN — LIDOCAINE 1 PATCH: 4 CREAM TOPICAL at 06:25

## 2023-04-19 RX ADMIN — NYSTATIN CREAM 1 APPLICATION(S): 100000 CREAM TOPICAL at 14:14

## 2023-04-19 RX ADMIN — Medication 1 TABLET(S): at 12:31

## 2023-04-19 RX ADMIN — Medication 650 MILLIGRAM(S): at 06:07

## 2023-04-19 RX ADMIN — Medication 20 MILLIGRAM(S): at 14:14

## 2023-04-19 RX ADMIN — CHLORHEXIDINE GLUCONATE 1 APPLICATION(S): 213 SOLUTION TOPICAL at 05:13

## 2023-04-19 RX ADMIN — Medication 50 MILLILITER(S): at 10:54

## 2023-04-19 RX ADMIN — Medication 20 MILLIEQUIVALENT(S): at 15:53

## 2023-04-19 RX ADMIN — NYSTATIN CREAM 1 APPLICATION(S): 100000 CREAM TOPICAL at 05:07

## 2023-04-19 RX ADMIN — Medication 650 MILLIGRAM(S): at 05:07

## 2023-04-19 RX ADMIN — Medication 3 UNIT(S): at 12:30

## 2023-04-19 NOTE — DISCHARGE NOTE PROVIDER - PROVIDER TOKENS
PROVIDER:[TOKEN:[[id number]:[id number]]],PROVIDER:[TOKEN:[[id number]]] PROVIDER:[TOKEN:[[id number]:[id number]],FOLLOWUP:[1 week],ESTABLISHEDPATIENT:[T]],PROVIDER:[TOKEN:[[id number]],FOLLOWUP:[1 week],ESTABLISHEDPATIENT:[T]],PROVIDER:[TOKEN:[[id number]],FOLLOWUP:[1 week],ESTABLISHEDPATIENT:[T]]

## 2023-04-19 NOTE — HISTORY OF PRESENT ILLNESS
Lm     [0] : a current pain level of 0/10 [None] : No exacerbating factors are noted [de-identified] : 63 year old M presents for a CT review of his lumbar spine. He is unable to get MRI secondary to loop recorder. He has had chronic neurogenic claudication that is worsening. He states he can only walk for few minutes before pain and discomfort worsens which forces him to stop, sit, and use shopping carts etc. [Ataxia] : no ataxia [Incontinence] : no incontinence [Loss of Dexterity] : good dexterity [Urinary Ret.] : no urinary retention

## 2023-04-19 NOTE — PROGRESS NOTE ADULT - PROBLEM SELECTOR PLAN 3
Pt. with hyponatremia in setting of HRS physiology and excessive free water intake. Albumin infusions should help with hyponatremia. Shanika also leading to decreased free water excretion. Pt discharged from OSH with salt tabs 1g TID; hold for now. Encourage solute intake; add protein shakes with meals. Limit free water intake Most recent SNA improved to 127. Monitor and avoid overcorrection, no more than 6-8mEq over 24 hour period.
Pt. with hyponatremia in setting of HRS physiology and excessive free water intake. Albumin infusions should help with hyponatremia. AUSTIN also leading to decreased free water excretion. Encourage solute intake; add protein shakes with meals. Limit free water intake. SNa stable at 132 today. Monitor SNa.
Pt. with hyponatremia in setting of HRS physiology and excessive free water intake. Albumin infusions should help with hyponatremia. AUSTIN also leading to decreased free water excretion. Encourage solute intake; add protein shakes with meals. Limit free water intake Most recent SNA improved to 131. Monitor SNa.
Pt. with hyponatremia in setting of HRS physiology and excessive free water intake. Albumin infusions should help with hyponatremia. Shanika also leading to decreased free water excretion. Pt discharged from OSH with salt tabs 1g TID; hold for now. Encourage solute intake; add protein shakes with meals. Limit free water intake Most recent SNA improved to 122. Monitor and avoid overcorrection, no more than 6-8mEq over 24 hour period.
-Patient with a history of HCC, will need outpatient follow up with Dr. Loza from hepatology and IR for Y90 mapping
-Patient with a history of HCC, will need outpatient follow up with Dr. Loza from hepatology and IR for Y90 mapping

## 2023-04-19 NOTE — PROGRESS NOTE ADULT - NUTRITIONAL ASSESSMENT
This patient has been assessed with a concern for Malnutrition and has been determined to have a diagnosis/diagnoses of Severe protein-calorie malnutrition.    This patient is being managed with:   Diet Easy to Chew-  Consistent Carbohydrate {Evening Snack} (CSTCHOSN)  1200mL Fluid Restriction ([id number])  Entered: Apr 13 2023  4:59PM  
This patient has been assessed with a concern for Malnutrition and has been determined to have a diagnosis/diagnoses of Severe protein-calorie malnutrition.    This patient is being managed with:   Diet Easy to Chew-  Consistent Carbohydrate {Evening Snack} (CSTCHOSN)  1200mL Fluid Restriction ([id number])  Entered: Apr 13 2023  4:59PM  
This patient has been assessed with a concern for Malnutrition and has been determined to have a diagnosis/diagnoses of Severe protein-calorie malnutrition.    This patient is being managed with:   Diet Consistent Carbohydrate Renal w/Evening Snack-  1200mL Fluid Restriction ([id number])  Supplement Feeding Modality:  Oral  Nepro Cans or Servings Per Day:  1       Frequency:  Two Times a day  Entered: Apr 16 2023 10:22AM  
This patient has been assessed with a concern for Malnutrition and has been determined to have a diagnosis/diagnoses of Severe protein-calorie malnutrition.    This patient is being managed with:   Diet Consistent Carbohydrate Renal w/Evening Snack-  1200mL Fluid Restriction ([id number])  Supplement Feeding Modality:  Oral  Nepro Cans or Servings Per Day:  1       Frequency:  Two Times a day  Entered: Apr 16 2023 10:22AM  
This patient has been assessed with a concern for Malnutrition and has been determined to have a diagnosis/diagnoses of Severe protein-calorie malnutrition.    This patient is being managed with:   Diet Consistent Carbohydrate Renal w/Evening Snack-  1200mL Fluid Restriction ([id number])  Supplement Feeding Modality:  Oral  Nepro Cans or Servings Per Day:  1       Frequency:  Two Times a day  Entered: Apr 16 2023 10:22AM

## 2023-04-19 NOTE — DISCHARGE NOTE PROVIDER - HOSPITAL COURSE
67 year old male with PMH HTN, HLD, NICM s/p [employer] AICD, DM2, ETOH cirrhosis with varices s/p banding and HCC who presented to IR for Y90 procedure but was found to be hypotensive and sent to the ER for shock possibly due to LVP with insufficient albumin replacement.      Hypovolemic shock.   ·  Plan: -Patient presented for Y90 mapping found to be in shock likely due to LVP with 8L removed->  requiring MICU admission for Norepinephrine and Midodrine  -Norepi stopped 4/14 and Midodrine stopped 4/16  -AICD interrogated with episodes of VT, started on Amio per EP  -On Lasix and Ramses per hepatology but holding Carvedilol, Lisinopril due to shock  -BP in acceptable range, continue to monitor.       Decompensated hepatic cirrhosis.   ·  Plan: -Patient with a history of cirrhosis c/b varices s/p banding and HCC with decompensation  -MELDNA 16 on 4/17  -EV: banding in 2022  -Ascites: Weekly paracentesis with most recent 4/15 with 4L removed  -Multifocal HCC+with two LIRAD 5 lesion in the Rt hepatic lobe, largest measuring 5.8 cm, pending mapping with IR for Y90 therapy  -NM bone scan (4/14) with no bone metastases  -Last AFP on 4/16 of 16,616  -HE: None  -Hepatology consulted,   -Continue Lasix 20mg daily and Aldactone 25m daily  -For IR para 4/19- s/p 3.8 L removed   -On Bactrim for SBP prophylaxis (cannot get Cipro due to interaction with Amio for QTC prolongation, discussed with hepatology fellow Nito Andrews and pharmacy).       HCC (hepatocellular carcinoma).   ·  Plan: -Patient with a history of HCC, will need outpatient follow up with Dr. Loza from hepatology and IR for Y90 mapping.      History of ventricular tachycardia.   ·  Plan: -Patient had device interrogation on admission with multiple episodes of VT  -EP started patient on Amio, will continue.      Scrotal swelling.   ·  Plan: -Patient complaining of scrotal edema, will obtain ultrasound-> Generalized scrotal skin thickening and possible hyperemia.  -Continue diuretics.     Diabetes mellitus.   ·  Plan: -Patient with a history of DM with A1C 6.5  -Currently on Lantus 10 untis and ISS, titrate insulin up as needed.  ?resume metformin on discharge ?______________*******      HTN (hypertension).   ·  Plan: -Patient with history of HTN appears to be on Carvedilol 25mg BID, Lisinopril 20mg daily and Lasix 40mg daily per med rec  -Patient presented with shock requiring Norepinephrine so BP meds held  -Restarted on Lasix 20mg daily and Spironolactone 25mg due to cirrhosis  -BP well controlled, continue to hold further BP meds.       HLD (hyperlipidemia).   ·  Plan: -Patient with history of HLD, continue home dose of Lipitor 10mg daily.    PT is medically stable for discharge      67 year old male with PMH HTN, HLD, NICM s/p [employer] AICD, DM2, ETOH cirrhosis with varices s/p banding and HCC who presented to IR for Y90 procedure but was found to be hypotensive and sent to the ER for shock possibly due to LVP with insufficient albumin replacement. He was started on Midodrine and Norepinephrine and was weaned off. His diuretics were reintroduced. He had a LVP with 3.8L removed with albumin replacement and was cleared by hepatology and nephrology for follow up outpatient.      Hypovolemic shock.   ·  Plan: -Patient presented for Y90 mapping found to be in shock likely due to LVP with 8L removed->  requiring MICU admission for Norepinephrine and Midodrine  -Norepi stopped 4/14 and Midodrine stopped 4/16  -AICD interrogated with episodes of VT, started on Amio per EP  -On Lasix and Ramses per hepatology but holding Carvedilol, Lisinopril due to shock  -BP in acceptable range, continue to monitor.       Decompensated hepatic cirrhosis.   ·  Plan: -Patient with a history of cirrhosis c/b varices s/p banding and HCC with decompensation  -MELDNA 16 on 4/17  -EV: banding in 2022  -Ascites: Weekly paracentesis with most recent 4/15 with 4L removed  -Multifocal HCC+with two LIRAD 5 lesion in the Rt hepatic lobe, largest measuring 5.8 cm, pending mapping with IR for Y90 therapy  -NM bone scan (4/14) with no bone metastases  -Last AFP on 4/16 of 16,616  -HE: None  -Hepatology consulted,   -Continue Lasix 20mg daily and Aldactone 25m daily  -For IR para 4/19- s/p 3.8 L removed   -On Bactrim for SBP prophylaxis (cannot get Cipro due to interaction with Amio for QTC prolongation, discussed with hepatology fellow Nito Andrews and pharmacy).       HCC (hepatocellular carcinoma).   ·  Plan: -Patient with a history of HCC, will need outpatient follow up with Dr. Loza from hepatology and IR for Y90 mapping.      History of ventricular tachycardia.   ·  Plan: -Patient had device interrogation on admission with multiple episodes of VT  -EP started patient on Amio, will continue.      Scrotal swelling.   ·  Plan: -Patient complaining of scrotal edema, will obtain ultrasound-> Generalized scrotal skin thickening and possible hyperemia.  -Continue diuretics.     Diabetes mellitus.   ·  Plan: -Patient with a history of DM with A1C 6.5  -Currently on Lantus 10 untis and ISS, titrate insulin up as needed.      HTN (hypertension).   ·  Plan: -Patient with history of HTN appears to be on Carvedilol 25mg BID, Lisinopril 20mg daily and Lasix 40mg daily per med rec  -Patient presented with shock requiring Norepinephrine so BP meds held  -Restarted on Lasix 20mg daily and Spironolactone 25mg due to cirrhosis  -BP well controlled, continue to hold further BP meds.       HLD (hyperlipidemia).   ·  Plan: -Patient with history of HLD, continue home dose of Lipitor 10mg daily.    PT is medically stable for discharge     Discharge Diagnoses  #Hypovolemic shock  #Decompensated cirrhosis  #Hepatocellular carcinoma  #Ventricular tachycardia  #Scrotal swelling  #Diabetes mellitus  #Essential HTN  #Hyperlipidemia

## 2023-04-19 NOTE — PROGRESS NOTE ADULT - PROVIDER SPECIALTY LIST ADULT
Hepatology
Hepatology
MICU
MICU
Nephrology
MICU
MICU
Hospitalist
Nephrology
Nephrology
Hospitalist
Nephrology

## 2023-04-19 NOTE — DISCHARGE NOTE PROVIDER - NSDCMRMEDTOKEN_GEN_ALL_CORE_FT
ALPRAZOLAM .5 MG TABS: 1  orally 3 times a day, As Needed  amiodarone 200 mg oral tablet: 2 tab(s) orally 2 times a day 2tab BID x 5days, then 2 tab QD thereafter  ATORVASTATIN CALCIUM 10 MG TABS: 1  orally once a day  carvedilol 25 mg oral tablet: 1 tab(s) orally 2 times a day  furosemide 40 mg oral tablet: 1 tab(s) orally once a day Last filled on October 2022  lisinopril 20 mg oral tablet: 1 tab(s) orally once a day  METFORMIN HYDROCHLORIDE 500 MG TABS: 1  orally 2 times a day  sodium bicarbonate: 650mg 2 tab TID  sodium chloride 1g tab, 1 tab TID:    ALPRAZOLAM .5 MG TABS: 1  orally 3 times a day, As Needed  ATORVASTATIN CALCIUM 10 MG TABS: 1  orally once a day  Lasix 20 mg oral tablet: 1 tab(s) orally once a day  METFORMIN HYDROCHLORIDE 500 MG TABS: 1  orally 2 times a day  Pacerone 200 mg oral tablet: 1 tab(s) orally once a day  spironolactone 25 mg oral tablet: 1 tab(s) orally once a day  sulfamethoxazole-trimethoprim 800 mg-160 mg oral tablet: 1 tab(s) orally once a day

## 2023-04-19 NOTE — PROGRESS NOTE ADULT - NSPROGADDITIONALINFOA_GEN_ALL_CORE
Medically ready for discharge with hepatology, PCP, cardiology and IR follow up. Discussed with patient, [facility] ACP, nephrology and hepatology. Total time spent discharge planning 45 minutes.
Discussed with patient, emergency contact [facility] and [facility].

## 2023-04-19 NOTE — DISCHARGE NOTE PROVIDER - NSDCFUSCHEDAPPT_GEN_ALL_CORE_FT
Chase Baron  [facility]  Scheduled Appointment: 04/20/2023    [facility]  HEPATOLOGY [address] [place] [address]   Scheduled Appointment: 04/24/2023

## 2023-04-19 NOTE — PROGRESS NOTE ADULT - PROBLEM SELECTOR PROBLEM 4
Metabolic acidosis
History of ventricular tachycardia
Metabolic acidosis
History of ventricular tachycardia

## 2023-04-19 NOTE — PROGRESS NOTE ADULT - PROBLEM SELECTOR PLAN 6
-Patient with a history of DM with A1C 6.5  -Currently on Lantus 10 untis and ISS, titrate insulin up as needed
-Patient with a history of DM with A1C 6.5  -Currently on Lantus 10 untis, premeal 3 units TID and ISS, titrate insulin up as needed

## 2023-04-19 NOTE — DISCHARGE NOTE PROVIDER - NSDCCPCAREPLAN_GEN_ALL_CORE_FT
PRINCIPAL DISCHARGE DIAGNOSIS  Diagnosis: Hypovolemic shock  Assessment and Plan of Treatment: Patient presented for Y90 mapping found to be in shock likely due to LVP with 8L removed->  requiring MICU admission for Norepinephrine and Midodrine  -Norepi stopped 4/14 and Midodrine stopped 4/16  -AICD interrogated with episodes of VT, started on Amio per EP  -On Lasix and Ramses per hepatology but holding Carvedilol, Lisinopril due to shock  -BP in acceptable range, continue to monitor.      SECONDARY DISCHARGE DIAGNOSES  Diagnosis: HCC (hepatocellular carcinoma)  Assessment and Plan of Treatment: Patient with a history of HCC, will need outpatient follow up with Dr. Loza from hepatology and IR for Y90 mapping.      Diagnosis: Scrotal swelling  Assessment and Plan of Treatment: Generalized scrotal skin thickening and possible hyperemia.  Cotinue with diuretics    Diagnosis: History of ventricular tachycardia  Assessment and Plan of Treatment: Patient had device interrogation on admission with multiple episodes of VT  -EP started patient on Amio, will continue.    Diagnosis: HTN (hypertension)  Assessment and Plan of Treatment: Low salt diet  Activity as tolerated.  Take all medication as prescribed.  Follow up with your medical doctor for routine blood pressure monitoring at your next visit.  Notify your doctor if you have any of the following symptoms:   Dizziness, Lightheadedness, Blurry vision, Headache, Chest pain, Shortness of breath      Diagnosis: Decompensated hepatic cirrhosis  Assessment and Plan of Treatment: Patient with a history of cirrhosis c/b varices s/p banding and HCC with decompensation  -MELDNA 16 on 4/17  -EV: banding in 2022  -Ascites: Weekly paracentesis with most recent 4/15 with 4L removed  -Multifocal HCC+with two LIRAD 5 lesion in the Rt hepatic lobe, largest measuring 5.8 cm, pending mapping with IR for Y90 therapy  -NM bone scan (4/14) with no bone metastases  -Last AFP on 4/16 of 16,616  -Hepatology consulted, -Continue Lasix 20mg daily and Aldactone 25m daily  -For IR para 4/19- s/p 3.8 L removed   -On Bactrim for SBP prophylaxis    Diagnosis: Acute kidney injury superimposed on CKD  Assessment and Plan of Treatment: Pt with AUSTIN on CKD likely in setting of hemodynamics,   UA without proteinuria of hematuria and urine lytes concerning for low [place] of 7 suggesting hepatorenal syndrome vs prerenal.   pt currently off IV vasopressors. No acute indication for RRT at this time.    Diagnosis: Diabetes mellitus  Assessment and Plan of Treatment: HgA1C this admission. (6.5)  Make sure you get your HgA1c checked every three months.  If you take oral diabetes medications, check your blood glucose two times a day.  If you take insulin, check your blood glucose before meals and at bedtime.  It's important not to skip any meals.  Keep a log of your blood glucose results and always take it with you to your doctor appointments.  Keep a list of your current medications including injectables and over the counter medications and bring this medication list with you to all your doctor appointments.  If you have not seen your ophthalmologist this year call for appointment.  Check your feet daily for redness, sores, or openings. Do not self treat. If no improvement in two days call your primary care physician for an appointment.  Low blood sugar (hypoglycemia) is a blood sugar below 70mg/dl. Check your blood sugar if you feel signs/symptoms of hypoglycemia. If your blood sugar is below 70 take 15 grams of carbohydrates (ex 4 oz of apple juice, 3-4 glucose tablets, or 4-6 oz of regular soda) wait 15 minutes and repeat blood sugar to make sure it comes up above 70.  If your blood sugar is above 70 and you are due for a meal, have a meal.  If you are not due for a meal have a snack.  This snack helps keeps your blood sugar at a safe range.       PRINCIPAL DISCHARGE DIAGNOSIS  Diagnosis: Cirrhosis  Assessment and Plan of Treatment: Please continue to take your Lasix 20mg and Spironolactone 25mg as prescribed. A supply was sent to your pharmacy. Continue to take bactrim daily for spontaneous bacterial peritonitis prophylaxis. A supply was sent to your pharmacy. Please follow up with Dr. Loza next week.      SECONDARY DISCHARGE DIAGNOSES  Diagnosis: HCC (hepatocellular carcinoma)  Assessment and Plan of Treatment: Please follow up outpatient with Dr. Loza from hepatology and interventional radiology for Y90 mapping.      Diagnosis: HTN (hypertension)  Assessment and Plan of Treatment: You came to the hospital with low blood pressure. Continue to take your Lasix 20mg and Spironolactone 25mg as prescribed. A supply was sent to your pharmacy. Your Lisinopril and Carvedilol are currently on hold due to your low blood pressure requiring vasopressors in the ICU. Please follow up with Dr. White next week to discuss reintroducing your blood pressure medications.      Diagnosis: History of ventricular tachycardia  Assessment and Plan of Treatment: You had your AICD interrogated by the electrophysiology team and ventricular tachycardia was found. Please continue to take Amiodarone 200mg daily. A 1 month supply was sent to your pharmacy. Please follow up with your cardiolgoist Dr. Rossi next week.

## 2023-04-19 NOTE — PROGRESS NOTE ADULT - SUBJECTIVE AND OBJECTIVE BOX
[facility] Division of Hospital Medicine  Adolph GabyroscoepradeepDO  Pager (JOHN, 9G-4I): [phone]  Other Times:  [phone]    Patient is a 67y old  Male who presents with a chief complaint of hypotension (19 Apr 2023 14:43)    SUBJECTIVE / OVERNIGHT EVENTS: No acute events overnight. Patient seen and examined at bedside this morning, feels better after paracentesis, reduction in abdominal and scrotal swelling. Denies chest pain, shortness of breath.    REVIEW OF SYSTEMS:    CONSTITUTIONAL: No fevers or chills  EYES/ENT: No visual changes;  No vertigo or throat pain   NECK: No pain or stiffness  RESPIRATORY: No cough, wheezing, hemoptysis; No shortness of breath  CARDIOVASCULAR: No chest pain or palpitations  GASTROINTESTINAL: Endorses abdominal pain and distension. No nausea, vomiting, or hematemesis; No diarrhea or constipation. No melena or hematochezia.  GENITOURINARY: No dysuria, frequency or hematuria, endorses scrotal swelling  NEUROLOGICAL: No numbness or weakness  SKIN: No itching, burning, rashes, or lesions  MSK: No joint pain, no back pain  HEME: No easy bleeding, no easy bruising  All other review of systems is negative unless indicated above.    MEDICATIONS  (STANDING):  aMIOdarone    Tablet 200 milliGRAM(s) Oral daily  atorvastatin 10 milliGRAM(s) Oral at bedtime  chlorhexidine 4% Liquid 1 Application(s) Topical <User Schedule>  dextrose 5%. 1000 milliLiter(s) (100 mL/Hr) IV Continuous <Continuous>  dextrose 5%. 1000 milliLiter(s) (50 mL/Hr) IV Continuous <Continuous>  dextrose 50% Injectable 25 Gram(s) IV Push once  dextrose 50% Injectable 12.5 Gram(s) IV Push once  dextrose 50% Injectable 25 Gram(s) IV Push once  furosemide    Tablet 20 milliGRAM(s) Oral every 24 hours  glucagon  Injectable 1 milliGRAM(s) IntraMuscular once  insulin glargine Injectable (LANTUS) 10 Unit(s) SubCutaneous at bedtime  insulin lispro (ADMELOG) corrective regimen sliding scale   SubCutaneous three times a day before meals  insulin lispro (ADMELOG) corrective regimen sliding scale   SubCutaneous at bedtime  insulin lispro Injectable (ADMELOG) 3 Unit(s) SubCutaneous three times a day before meals  lidocaine   4% Patch 1 Patch Transdermal every 24 hours  melatonin 6 milliGRAM(s) Oral at bedtime  nystatin Powder 1 Application(s) Topical every 8 hours  spironolactone 25 milliGRAM(s) Oral daily  trimethoprim  160 mG/sulfamethoxazole 800 mG 1 Tablet(s) Oral daily    MEDICATIONS  (PRN):  acetaminophen     Tablet .. 650 milliGRAM(s) Oral every 6 hours PRN Moderate Pain (4 - 6)  dextrose Oral Gel 15 Gram(s) Oral once PRN Blood Glucose LESS THAN 70 milliGRAM(s)/deciliter      CAPILLARY BLOOD GLUCOSE      POCT Blood Glucose.: 148 mg/dL (19 Apr 2023 12:27)  POCT Blood Glucose.: 163 mg/dL (18 Apr 2023 22:44)  POCT Blood Glucose.: 140 mg/dL (18 Apr 2023 17:29)    I&O's Summary      PHYSICAL EXAM:  Vital Signs Last 24 Hrs  T(C): 36.6 (19 Apr 2023 13:00), Max: 37 (18 Apr 2023 20:26)  T(F): 97.9 (19 Apr 2023 13:00), Max: 98.6 (18 Apr 2023 20:26)  HR: 80 (19 Apr 2023 13:00) (79 - 80)  BP: 121/69 (19 Apr 2023 13:00) (121/69 - 131/68)  BP(mean): --  RR: 18 (19 Apr 2023 13:00) (18 - 18)  SpO2: 100% (19 Apr 2023 13:00) (99% - 100%)    Parameters below as of 19 Apr 2023 13:00  Patient On (Oxygen Delivery Method): room air    CONSTITUTIONAL: NAD, well-developed, well-groomed  EYES: EOMI; conjunctiva and sclera clear  ENMT: Moist oral mucosa, no pharyngeal injection or exudates  RESPIRATORY: Normal respiratory effort; lungs are clear to auscultation bilaterally  CARDIOVASCULAR: Regular rate and rhythm, normal S1 and S2, no murmur/rub/gallop; 1+ pitting edema bilaterally  ABDOMEN: Distended, but soft and nontender to palpation, normoactive bowel sounds, no rebound/guarding  GENITOURINARY: Scrotal edema  MUSCULOSKELETAL: N clubbing or cyanosis of digits; no joint swelling or tenderness to palpation  PSYCH: A+O to person, place, and time; affect appropriate  NEUROLOGY: CN 2-12 are intact and symmetric; no gross sensory deficits   SKIN: No rashes; no palpable lesions    LABS:                        9.4    3.39  )-----------( 104      ( 19 Apr 2023 07:36 )             28.7     04-19    130<L>  |  97  |  26<H>  ----------------------------<  156<H>  3.4<L>   |  22  |  0.91    Ca    8.3<L>      19 Apr 2023 07:36  Phos  3.0     04-19  Mg     1.6     04-19    TPro  5.4<L>  /  Alb  3.0<L>  /  TBili  0.6  /  DBili  x   /  AST  43<H>  /  ALT  40  /  AlkPhos  83  04-19    PT/INR - ( 19 Apr 2023 07:36 )   PT: 14.5 sec;   INR: 1.25 ratio         PTT - ( 19 Apr 2023 07:36 )  PTT:28.5 sec

## 2023-04-19 NOTE — PRE PROCEDURE NOTE - PRE PROCEDURE EVALUATION
Interventional Radiology    HPI: 66 yo M with hypertension, dyslipidemia, DM2, NICM (s/p AICD in ) complicated by recurrent VT (currently on amiodarone), and decompensated alcohol-associated cirrhosis complicated by refractory ascites requiring weekly paracentesis, hyponatremia, non-bleeding EV (s/p EVL in ), and multifocal HCC, currently admitted to MICU with shock requiring norepinephrine with associated AUSTIN on CKD and acute on chronic hyponatremia. Patient now transferred to floor. Abdominal US obtained today shows moderate-large ascites, IR consulted for paracentesis.     NPO:    Allergies: No Known Allergies    Medications (Abx/Cardiac/Anticoagulation/Blood Products)  aMIOdarone    Tablet: 200 milliGRAM(s) Oral ( @ 05:07)  cefTRIAXone   IVPB: 100 mL/Hr IV Intermittent ( @ 17:57)  furosemide    Tablet: 20 milliGRAM(s) Oral ( @ 13:45)  heparin   Injectable: 5000 Unit(s) SubCutaneous ( @ 13:20)  spironolactone: 25 milliGRAM(s) Oral ( @ 05:07)  trimethoprim  160 mG/sulfamethoxazole 800 m Tablet(s) Oral ( @ 13:20)    Data:    T(C): 36.6  HR: 79  BP: 126/69  RR: 18  SpO2: 99%    Hepatic cirrhosis    Family history of gastric cancer (Mother)    Family history of colon cancer (Father)    Handoff    MEWS Score    Hypertension    DM (diabetes mellitus)    HLD (hyperlipidemia)    Nonischemic cardiomyopathy    Liver cell carcinoma    Ascites    Decompensated hepatic cirrhosis    Acute kidney injury superimposed on CKD    Hyperkalemia    Hyponatremia    Metabolic acidosis    Hypovolemic shock    Decompensated hepatic cirrhosis    HCC (hepatocellular carcinoma)    History of ventricular tachycardia    Scrotal swelling    Diabetes mellitus    HTN (hypertension)    HLD (hyperlipidemia)    Need for prophylactic measure    Nutrition, metabolism, and development symptoms    AICD (automatic cardioverter/defibrillator) present    H/O excision of mass    HYPOTENSION    90+    Hepatorenal syndrome    SysAdmin_VisitLink        Exam  General: No acute distress  Chest: Non labored breathing    -WBC 3.39 / HgB 9.4 / Hct 28.7 / Plt 104  -Na 130 / Cl 97 / BUN 26 / Glucose 156  -K 3.4 / CO2 22 / Cr 0.91  -ALT 40 / Alk Phos 83 / T.Bili 0.6  -INR1.25    Imaging:     Plan: 67y Male presents for dx/tx paracentesis.  - Will plan for 3:1 albumin replacement  -Risks/Benefits/alternatives explained with the patient and/or healthcare proxy and witnessed informed consent obtained.

## 2023-04-19 NOTE — PROGRESS NOTE ADULT - PROBLEM SELECTOR PROBLEM 1
Acute kidney injury superimposed on CKD
Hypovolemic shock
Hypovolemic shock

## 2023-04-19 NOTE — PROCEDURE NOTE - NSPOSTCAREGUIDE_GEN_A_CORE
Instructed patient/caregiver regarding signs and symptoms of infection
Instructed patient/caregiver to follow-up with primary care physician/Instructed patient/caregiver regarding signs and symptoms of infection

## 2023-04-19 NOTE — DISCHARGE NOTE NURSING/CASE MANAGEMENT/SOCIAL WORK - NSDCPETBCESMAN_GEN_ALL_CORE
If you are a smoker, it is important for your health to stop smoking. Please be aware that second hand smoke is also harmful.
Dr Tirado in Office 3/5/19    Dr Camp in Office in one week

## 2023-04-19 NOTE — PROGRESS NOTE ADULT - PROBLEM SELECTOR PROBLEM 3
Hyponatremia
HCC (hepatocellular carcinoma)
HCC (hepatocellular carcinoma)

## 2023-04-19 NOTE — DISCHARGE NOTE PROVIDER - CARE PROVIDER_API CALL
Aniket Loza)  Gastroenterology; Internal Medicine; Transplant Hepatology  [address] [place] [address]  Phone: [phone]  Fax: [phone]  Follow Up Time:     HI LIM  Internal Medicine  [address] [place] [address]  Phone: ([phone]  Fax: ()-  Follow Up Time:    Aniket Loza)  Gastroenterology; Internal Medicine; Transplant Hepatology  [address] [place] [address]  Phone: [phone]  Fax: [phone]  Established Patient  Follow Up Time: 1 week    HI LIM  Internal Medicine  [address] [place] [address]  Phone: [phone]  Fax: ([phone]  Established Patient  Follow Up Time: 1 week    ERIC ANGEL  Cardiovascular Diseases  [address] [place] [address].  [place] [address]  Phone: [phone]  Fax: [phone]  Established Patient  Follow Up Time: 1 week

## 2023-04-19 NOTE — PROGRESS NOTE ADULT - PROBLEM SELECTOR PLAN 2
Pt. with hyperkalemia in setting of AUSTIN. Serum potassium most recently 5.3. Monitor serum potassium.
Pt. with hyperkalemia in setting of AUSTIN. Serum potassium most recently 3.7. Monitor serum potassium
Pt. with hyperkalemia in setting of [place]USTIN. Serum potassium most recently 4.3. Monitor serum potassium.
-Patient with a history of cirrhosis c/b varices s/p banding and HCC with decompensation  -MELDNA 16 on 4/17  -EV: banding in 2022  -Ascites: Weekly paracentesis with most recent 4/15 with 4L removed  -Multifocal HCC+with two LIRAD 5 lesion in the Rt hepatic lobe, largest measuring 5.8 cm, pending mapping with IR for Y90 therapy  -NM bone scan (4/14) with no bone metastases  -Last AFP on 4/16 of 16,616  -HE: None  -Hepatology consulted, follow up recs  -Continue Lasix 20mg daily and Aldactone 25m daily  -For IR para 4/19  -On Bactrim for SBP prophylaxis (cannot get Cipro due to interaction with Amio for QTC prolongation, discussed with hepatology fellow Nito Andrews and pharmacy)
Pt. with hyperkalemia in setting of AUSTIN. Serum potassium most recently 3.7. Monitor serum potassium.
-Patient with a history of cirrhosis c/b varices s/p banding and HCC with decompensation  -MELDNA 17 on 4/19  -EV: banding in 2022  -Ascites: Weekly paracentesis with most recent today with 3.8L removed  -Multifocal HCC+with two LIRAD 5 lesion in the Rt hepatic lobe, largest measuring 5.8 cm, pending mapping with IR for Y90 therapy  -NM bone scan (4/14) with no bone metastases  -Last AFP on 4/16 of 16,616  -HE: None  -Hepatology consulted, follow up recs  -Continue Lasix 20mg daily and Aldactone 25m daily  -On Bactrim for SBP prophylaxis (cannot get Cipro due to interaction with Amio for QTC prolongation, discussed with hepatology fellow Nito Andrews and pharmacy)

## 2023-04-19 NOTE — PROCEDURE NOTE - NSPROCDETAILS_GEN_ALL_CORE
Seldinger technique/sterile dressing applied
location identified, sterile technique used, catheter introduced, fluid drained/Seldinger technique/sterile dressing applied

## 2023-04-19 NOTE — PROGRESS NOTE ADULT - PROBLEM SELECTOR PROBLEM 2
Hyperkalemia
Decompensated hepatic cirrhosis
Decompensated hepatic cirrhosis

## 2023-04-19 NOTE — PROCEDURE NOTE - NSPROCNAME_GEN_A_CORE
Paracentesis
Paracentesis
CRT-D (Cardiac Resynchronization Therapy with Defibrillation Capabilities) Interrogation Note

## 2023-04-19 NOTE — DISCHARGE NOTE NURSING/CASE MANAGEMENT/SOCIAL WORK - NSDCPEFALRISK_GEN_ALL_CORE
For information on Fall & Injury Prevention, visit: [email or web address] OR  [email or web address] OR  https://www.cdc.gov/steadi/patient.html

## 2023-04-19 NOTE — PROGRESS NOTE ADULT - PROBLEM SELECTOR PLAN 8
-Patient with history of HLD, continue home dose of Lipitor 10mg daily
-Patient with history of HLD, continue home dose of Lipitor 10mg daily

## 2023-04-19 NOTE — PROGRESS NOTE ADULT - PROBLEM SELECTOR PLAN 1
-Patient presented for Y90 mapping found to be in shock likely due to LVP with 8L removed requiring Norepinephrine and Midodrine  -Norepi stopped 4/14 and Midodrine stopped 4/16  -AICD interrogated with episodes of VT, started on Amio per EP  -On Lasix and [place] per hepatology but holding Carvedilol, Lisinopril due to shock  -BP in acceptable range, continue to monitor

## 2023-04-19 NOTE — PROCEDURE NOTE - ADDITIONAL PROCEDURE DETAILS
albumin 25%50ml x 1 given
Findings: Normal device function   Review of arrhythmia logbook since last check 4/7/23 reveal no arrhythmia episodes  BiV Paced 95% AP 1.1%    Asked by MICU to interrogate for old arrhythmia episodes (as patient was told he had gotten ICD shocks at OSH), 12/2022 - 4/7/2023:   6 episodes of VT in monitor zone, and 7 episodes in zone 2 between 4/3-4/6. 7 episodes in therapy zone were treated successfully with ATP. No shocks were delivered.   Also w/ 17 episodes of NSVT and 18 episodes of SVT in same date range.

## 2023-04-19 NOTE — PROGRESS NOTE ADULT - PROBLEM SELECTOR PLAN 7
-Patient with history of HTN appears to be on Carvedilol 25mg BID, Lisinopril 20mg daily and Lasix 40mg daily per med rec  -Patient presented with shock requiring Norepinephrine so BP meds held  -Restarted on Lasix 20mg daily and Spironolactone 25mg due to cirrhosis  -BP well controlled, continue to hold further BP meds
-Patient with history of HTN appears to be on Carvedilol 25mg BID, Lisinopril 20mg daily and Lasix 40mg daily per med rec  -Patient presented with shock requiring Norepinephrine so BP meds held  -Restarted on Lasix 20mg daily and Spironolactone 25mg due to cirrhosis  -BP well controlled, continue to hold further BP meds

## 2023-04-19 NOTE — PROGRESS NOTE ADULT - PROBLEM SELECTOR PLAN 5
-Patient complaining of scrotal edema, will obtain ultrasound  -Continue diuretics
-Patient complaining of scrotal edema, US with no hydrocele or varicocele  -Continue diuretics, swelling has reduced since yesterday

## 2023-04-19 NOTE — PROGRESS NOTE ADULT - PROBLEM SELECTOR PLAN 10
-No IVF indicated  -Replete to K>4 and Mg>2  -CC diet with fluid restriction
-No IVF indicated  -Replete to K>4 and Mg>2  -CC diet with fluid restriction

## 2023-04-20 RX ORDER — AMIODARONE HYDROCHLORIDE 400 MG/1
1 TABLET ORAL
Qty: 30 | Refills: 0
Start: 2023-04-20 | End: 2023-05-19

## 2023-04-20 RX ORDER — FUROSEMIDE 40 MG
1 TABLET ORAL
Qty: 30 | Refills: 0
Start: 2023-04-20 | End: 2023-05-19

## 2023-04-23 ENCOUNTER — FORM ENCOUNTER (OUTPATIENT)
Age: 68
End: 2023-04-23

## 2023-04-24 ENCOUNTER — NON-APPOINTMENT (OUTPATIENT)
Age: 68
End: 2023-04-24

## 2023-04-24 ENCOUNTER — APPOINTMENT (OUTPATIENT)
Dept: HEPATOLOGY | Facility: CLINIC | Age: 68
End: 2023-04-24
Payer: MEDICAID

## 2023-04-24 ENCOUNTER — LABORATORY RESULT (OUTPATIENT)
Age: 68
End: 2023-04-24

## 2023-04-24 VITALS
RESPIRATION RATE: 16 BRPM | WEIGHT: 170 LBS | HEIGHT: 72 IN | BODY MASS INDEX: 23.03 KG/M2 | HEART RATE: 80 BPM | DIASTOLIC BLOOD PRESSURE: 83 MMHG | TEMPERATURE: 97.9 F | SYSTOLIC BLOOD PRESSURE: 126 MMHG | OXYGEN SATURATION: 98 %

## 2023-04-24 LAB
AFP-TM SERPL-MCNC: ABNORMAL NG/ML
ALBUMIN SERPL ELPH-MCNC: 3.9 G/DL
ALP BLD-CCNC: 147 U/L
ALT SERPL-CCNC: 55 U/L
ANION GAP SERPL CALC-SCNC: 12 MMOL/L
AST SERPL-CCNC: 65 U/L
BILIRUB SERPL-MCNC: 0.6 MG/DL
BUN SERPL-MCNC: 42 MG/DL
CALCIUM SERPL-MCNC: 9.6 MG/DL
CHLORIDE SERPL-SCNC: 98 MMOL/L
CO2 SERPL-SCNC: 22 MMOL/L
CREAT SERPL-MCNC: 1.27 MG/DL
CULTURE RESULTS: SIGNIFICANT CHANGE UP
EGFR: 62 ML/MIN/1.73M2
GLUCOSE SERPL-MCNC: 160 MG/DL
MAGNESIUM SERPL-MCNC: 1.6 MG/DL
POTASSIUM SERPL-SCNC: 5.3 MMOL/L
PROT SERPL-MCNC: 6.4 G/DL
SODIUM SERPL-SCNC: 131 MMOL/L
SPECIMEN SOURCE: SIGNIFICANT CHANGE UP

## 2023-04-24 PROCEDURE — 99214 OFFICE O/P EST MOD 30 MIN: CPT

## 2023-04-24 NOTE — ASSESSMENT
[FreeTextEntry1] : 67M with decompensated ETOH Cirrhosis - recurrent ascites requiring LVP every week\par He has enlarging liver masses consistent with multifocal HCC with worsening AFP\par His should be able to tolerate loco-regional therapy with y90 of dominant lesion - d/w IR\par \par Patients recent decompensation is likely a result of undiagnosed VT\par Has follow up with cardiology\par Continue Amiodarone\par \par 1) HCC treatment - enlarging since fall - no treatment at this time - d/w IR - plan for y90. ECOG 0\par 2) Awaiting Bone scan - currently on appeal\par 3) Continue serial paracentesis for volume management - started on low dose diuretics \par \par Patient continues to have issues possibly following with us at [address]\par Dr. Medellin's office is closer -  i will communicate with her\par WIll d/w IR for y90 mapping - \par \par RTC 1 month if He cant be seen in [place]\par \par Plan for tap this week\par Repeat labs with AFP

## 2023-04-24 NOTE — HISTORY OF PRESENT ILLNESS
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 Reports last drink was Spring 2022. He denies hx of SBP\par \par Lives alone & states he is self sufficient conducting all ADLs. States he has help available - friends nearby - should he need it. No close family nearby\par Reports quality of life is compromised 2/2 recurrent ascites requiring frequent LVPs.\par He was initially referred to IR for TIPS eval but sent to us for evaluation for HCC treatment and establishment of OLT candidacy\par \par He denies abdominal pain, scleral or dermal discoloration,  LE edema, altered mentation, hematemesis, hemoptysis, hematochezia, melena,  n/v/d, unintentional weight loss or gain but reports abdominal distention & recurrent ascites and pain related to this\par His weight has been stable\par His last paracentesis was yesterday\par Denies nsaids or a/c usage.\par Former gaspar\par \par Colonoscopy 8/2022 - stool in colon, small polyp 5mm, rectal varices, internal hemorrhoids\par EGD - EV banded and eradicated, PHG with gastritis, duodenal nodule\par +FHX of colon cancer\par \par PCP Dr Chu White [phone]\par Cardiac Dr Mata [phone]\par GI: Sukhjinder Salazar - [phone]\par Oncology - Brannon Kee - [phone]\par \par patient was referred to [facility] for OLT eval in spring/summer\par reportedly underwent eval but no treatment of HCC was discussed\par Insurance no longer covered at [facility] and patient lost to follow up though have to clarify records\par Unclear how much patient remembers from evaluation\par Unclear what type of metastatic workup has been done\par \par Had Tumor board discussion\par Getting LVP every week\par Finally spoke with Onc from [facility]\par They want to proceed with y90\par \par LOV 3/15/23\par Interval Hx\par - patient with recent admission for hypokalemia and AUSTIN \par - he has serial paracentesis over the past week or so\par - started on PO Lasix and Spironolactone\par - have 2 BM per day\par - new onset tremors - no asterixis \par - episodes of VT - started on amiodarone\par - He has yet to complete y90\par - He feels much better today\par - will get repeat labs including AFP\par - Discussed role of indwelling catheter - patient concerned about infection so prefers serial taps

## 2023-04-24 NOTE — PHYSICAL EXAM
[General Appearance - Alert] : alert [General Appearance - In No Acute Distress] : in no acute distress [Sclera] : the sclera and conjunctiva were normal [PERRL With Normal Accommodation] : pupils were equal in size, round, and reactive to light [] : no respiratory distress [Heart Rate And Rhythm] : heart rate was normal and rhythm regular [Auscultation Breath Sounds / Voice Sounds] : lungs were clear to auscultation bilaterally [Heart Sounds] : normal S1 and S2 [Heart Sounds Gallop] : no gallops [Murmurs] : no murmurs [Heart Sounds Pericardial Friction Rub] : no pericardial rub [No CVA Tenderness] : no ~M costovertebral angle tenderness [No Spinal Tenderness] : no spinal tenderness [Abnormal Walk] : normal gait [Nail Clubbing] : no clubbing  or cyanosis of the fingernails [Musculoskeletal - Swelling] : no joint swelling seen [Motor Tone] : muscle strength and tone were normal [Deep Tendon Reflexes (DTR)] : deep tendon reflexes were 2+ and symmetric [Sensation] : the sensory exam was normal to light touch and pinprick [No Focal Deficits] : no focal deficits [Oriented To Time, Place, And Person] : oriented to person, place, and time [Impaired Insight] : insight and judgment were intact [Affect] : the affect was normal [Abdominal  Ascites] : ascites [Ascites Fluid Wave] : positive ascites fluid wave [Ascites Tense] : ascites is tense [Ascites Shifting Dullness] : shifting dullness if ascites [Scrotal Edema] : Scrotum is not edematous [Asterixis] : no asterixis observed [Jaundice] : No jaundice [Depression] : no depression [Hallucinations] : ~T no ~M hallucinations [FreeTextEntry1] : distende abd soft no guarding or rebeound

## 2023-04-25 ENCOUNTER — NON-APPOINTMENT (OUTPATIENT)
Age: 68
End: 2023-04-25

## 2023-04-25 LAB
BASOPHILS # BLD AUTO: 0.04 K/UL
BASOPHILS NFR BLD AUTO: 0.8 %
EOSINOPHIL # BLD AUTO: 0.09 K/UL
EOSINOPHIL NFR BLD AUTO: 1.9 %
HCT VFR BLD CALC: 31.9 %
HGB BLD-MCNC: 9.9 G/DL
IMM GRANULOCYTES NFR BLD AUTO: 1 %
INR PPP: 1.2 RATIO
LYMPHOCYTES # BLD AUTO: 0.49 K/UL
LYMPHOCYTES NFR BLD AUTO: 10.2 %
MAN DIFF?: NORMAL
MCHC RBC-ENTMCNC: 31 GM/DL
MCHC RBC-ENTMCNC: 33.6 PG
MCV RBC AUTO: 108.1 FL
MONOCYTES # BLD AUTO: 0.66 K/UL
MONOCYTES NFR BLD AUTO: 13.7 %
NEUTROPHILS # BLD AUTO: 3.48 K/UL
NEUTROPHILS NFR BLD AUTO: 72.4 %
PLATELET # BLD AUTO: 157 K/UL
PT BLD: 13.9 SEC
RBC # BLD: 2.95 M/UL
RBC # FLD: 13.9 %
WBC # FLD AUTO: 4.81 K/UL

## 2023-04-27 ENCOUNTER — APPOINTMENT (OUTPATIENT)
Dept: NUCLEAR MEDICINE | Facility: HOSPITAL | Age: 68
End: 2023-04-27

## 2023-05-10 ENCOUNTER — APPOINTMENT (OUTPATIENT)
Dept: NUCLEAR MEDICINE | Facility: HOSPITAL | Age: 68
End: 2023-05-10

## 2023-05-10 ENCOUNTER — TRANSCRIPTION ENCOUNTER (OUTPATIENT)
Age: 68
End: 2023-05-10

## 2023-05-10 ENCOUNTER — OUTPATIENT (OUTPATIENT)
Dept: OUTPATIENT SERVICES | Facility: HOSPITAL | Age: 68
LOS: 1 days | End: 2023-05-10
Payer: MEDICARE

## 2023-05-10 ENCOUNTER — INPATIENT (INPATIENT)
Facility: HOSPITAL | Age: 68
LOS: 0 days | Discharge: HOME | DRG: 641 | End: 2023-05-11
Attending: STUDENT IN AN ORGANIZED HEALTH CARE EDUCATION/TRAINING PROGRAM | Admitting: STUDENT IN AN ORGANIZED HEALTH CARE EDUCATION/TRAINING PROGRAM
Payer: MEDICARE

## 2023-05-10 VITALS
RESPIRATION RATE: 20 BRPM | DIASTOLIC BLOOD PRESSURE: 74 MMHG | WEIGHT: 160.06 LBS | SYSTOLIC BLOOD PRESSURE: 138 MMHG | OXYGEN SATURATION: 100 % | HEIGHT: 72 IN | HEART RATE: 80 BPM

## 2023-05-10 VITALS
OXYGEN SATURATION: 100 % | SYSTOLIC BLOOD PRESSURE: 138 MMHG | DIASTOLIC BLOOD PRESSURE: 74 MMHG | WEIGHT: 160.06 LBS | RESPIRATION RATE: 16 BRPM | HEART RATE: 80 BPM | HEIGHT: 72 IN | TEMPERATURE: 98 F

## 2023-05-10 DIAGNOSIS — C22.0 LIVER CELL CARCINOMA: ICD-10-CM

## 2023-05-10 DIAGNOSIS — E78.5 HYPERLIPIDEMIA, UNSPECIFIED: ICD-10-CM

## 2023-05-10 DIAGNOSIS — E11.9 TYPE 2 DIABETES MELLITUS WITHOUT COMPLICATIONS: ICD-10-CM

## 2023-05-10 DIAGNOSIS — K70.30 ALCOHOLIC CIRRHOSIS OF LIVER WITHOUT ASCITES: ICD-10-CM

## 2023-05-10 DIAGNOSIS — E87.5 HYPERKALEMIA: ICD-10-CM

## 2023-05-10 DIAGNOSIS — Z98.890 OTHER SPECIFIED POSTPROCEDURAL STATES: Chronic | ICD-10-CM

## 2023-05-10 DIAGNOSIS — I10 ESSENTIAL (PRIMARY) HYPERTENSION: ICD-10-CM

## 2023-05-10 DIAGNOSIS — N17.9 ACUTE KIDNEY FAILURE, UNSPECIFIED: ICD-10-CM

## 2023-05-10 DIAGNOSIS — E87.1 HYPO-OSMOLALITY AND HYPONATREMIA: ICD-10-CM

## 2023-05-10 DIAGNOSIS — Z95.810 PRESENCE OF AUTOMATIC (IMPLANTABLE) CARDIAC DEFIBRILLATOR: Chronic | ICD-10-CM

## 2023-05-10 DIAGNOSIS — I42.8 OTHER CARDIOMYOPATHIES: ICD-10-CM

## 2023-05-10 DIAGNOSIS — Z29.9 ENCOUNTER FOR PROPHYLACTIC MEASURES, UNSPECIFIED: ICD-10-CM

## 2023-05-10 LAB
ALBUMIN SERPL ELPH-MCNC: 3.6 G/DL — SIGNIFICANT CHANGE UP (ref 3.3–5)
ALBUMIN SERPL ELPH-MCNC: 3.8 G/DL — SIGNIFICANT CHANGE UP (ref 3.3–5)
ALP SERPL-CCNC: 129 U/L — HIGH (ref 40–120)
ALP SERPL-CCNC: 163 U/L — HIGH (ref 40–120)
ALT FLD-CCNC: 59 U/L — HIGH (ref 10–45)
ALT FLD-CCNC: 60 U/L — HIGH (ref 10–45)
ANION GAP SERPL CALC-SCNC: 11 MMOL/L — SIGNIFICANT CHANGE UP (ref 5–17)
ANION GAP SERPL CALC-SCNC: 14 MMOL/L — SIGNIFICANT CHANGE UP (ref 5–17)
ANISOCYTOSIS BLD QL: SLIGHT — SIGNIFICANT CHANGE UP
APTT BLD: 28 SEC — SIGNIFICANT CHANGE UP (ref 27.5–35.5)
APTT BLD: 30.6 SEC — SIGNIFICANT CHANGE UP (ref 27.5–35.5)
AST SERPL-CCNC: 66 U/L — HIGH (ref 10–40)
AST SERPL-CCNC: 96 U/L — HIGH (ref 10–40)
BASE EXCESS BLDV CALC-SCNC: -6 MMOL/L — LOW (ref -2–3)
BASE EXCESS BLDV CALC-SCNC: -6.3 MMOL/L — LOW (ref -2–3)
BASOPHILS # BLD AUTO: 0.04 K/UL — SIGNIFICANT CHANGE UP (ref 0–0.2)
BASOPHILS NFR BLD AUTO: 0.9 % — SIGNIFICANT CHANGE UP (ref 0–2)
BILIRUB SERPL-MCNC: 0.6 MG/DL — SIGNIFICANT CHANGE UP (ref 0.2–1.2)
BILIRUB SERPL-MCNC: 0.7 MG/DL — SIGNIFICANT CHANGE UP (ref 0.2–1.2)
BLD GP AB SCN SERPL QL: NEGATIVE — SIGNIFICANT CHANGE UP
BLOOD GAS VENOUS - CREATININE: SIGNIFICANT CHANGE UP MG/DL (ref 0.5–1.3)
BUN SERPL-MCNC: 39 MG/DL — HIGH (ref 7–23)
BUN SERPL-MCNC: 39 MG/DL — HIGH (ref 7–23)
BURR CELLS BLD QL SMEAR: PRESENT — SIGNIFICANT CHANGE UP
CA-I SERPL-SCNC: 1.16 MMOL/L — SIGNIFICANT CHANGE UP (ref 1.15–1.33)
CA-I SERPL-SCNC: 1.23 MMOL/L — SIGNIFICANT CHANGE UP (ref 1.15–1.33)
CALCIUM SERPL-MCNC: 8.9 MG/DL — SIGNIFICANT CHANGE UP (ref 8.4–10.5)
CALCIUM SERPL-MCNC: 9.7 MG/DL — SIGNIFICANT CHANGE UP (ref 8.4–10.5)
CHLORIDE BLDV-SCNC: 98 MMOL/L — SIGNIFICANT CHANGE UP (ref 96–108)
CHLORIDE BLDV-SCNC: 98 MMOL/L — SIGNIFICANT CHANGE UP (ref 96–108)
CHLORIDE SERPL-SCNC: 97 MMOL/L — SIGNIFICANT CHANGE UP (ref 96–108)
CHLORIDE SERPL-SCNC: 99 MMOL/L — SIGNIFICANT CHANGE UP (ref 96–108)
CO2 BLDV-SCNC: 20 MMOL/L — LOW (ref 22–26)
CO2 BLDV-SCNC: 21 MMOL/L — LOW (ref 22–26)
CO2 SERPL-SCNC: 15 MMOL/L — LOW (ref 22–31)
CO2 SERPL-SCNC: 20 MMOL/L — LOW (ref 22–31)
CREAT SERPL-MCNC: 1.3 MG/DL — SIGNIFICANT CHANGE UP (ref 0.5–1.3)
CREAT SERPL-MCNC: 1.31 MG/DL — HIGH (ref 0.5–1.3)
DACRYOCYTES BLD QL SMEAR: SLIGHT — SIGNIFICANT CHANGE UP
EGFR: 60 ML/MIN/1.73M2 — SIGNIFICANT CHANGE UP
EGFR: 60 ML/MIN/1.73M2 — SIGNIFICANT CHANGE UP
EOSINOPHIL # BLD AUTO: 0.04 K/UL — SIGNIFICANT CHANGE UP (ref 0–0.5)
EOSINOPHIL NFR BLD AUTO: 0.9 % — SIGNIFICANT CHANGE UP (ref 0–6)
GAS PNL BLDV: 123 MMOL/L — LOW (ref 136–145)
GAS PNL BLDV: 127 MMOL/L — LOW (ref 136–145)
GAS PNL BLDV: SIGNIFICANT CHANGE UP
GLUCOSE BLDC GLUCOMTR-MCNC: 111 MG/DL — HIGH (ref 70–99)
GLUCOSE BLDC GLUCOMTR-MCNC: 114 MG/DL — HIGH (ref 70–99)
GLUCOSE BLDC GLUCOMTR-MCNC: 191 MG/DL — HIGH (ref 70–99)
GLUCOSE BLDV-MCNC: 130 MG/DL — HIGH (ref 70–99)
GLUCOSE BLDV-MCNC: 149 MG/DL — HIGH (ref 70–99)
GLUCOSE SERPL-MCNC: 135 MG/DL — HIGH (ref 70–99)
GLUCOSE SERPL-MCNC: 144 MG/DL — HIGH (ref 70–99)
HCO3 BLDV-SCNC: 19 MMOL/L — LOW (ref 22–29)
HCO3 BLDV-SCNC: 20 MMOL/L — LOW (ref 22–29)
HCT VFR BLD CALC: 33.9 % — LOW (ref 39–50)
HCT VFR BLD CALC: 36.6 % — LOW (ref 39–50)
HCT VFR BLDA CALC: 35 % — LOW (ref 39–51)
HCT VFR BLDA CALC: 35 % — LOW (ref 39–51)
HGB BLD CALC-MCNC: 11.5 G/DL — LOW (ref 12.6–17.4)
HGB BLD CALC-MCNC: 11.7 G/DL — LOW (ref 12.6–17.4)
HGB BLD-MCNC: 11.3 G/DL — LOW (ref 13–17)
HGB BLD-MCNC: 12.1 G/DL — LOW (ref 13–17)
INR BLD: 1.16 RATIO — SIGNIFICANT CHANGE UP (ref 0.88–1.16)
INR BLD: 1.16 RATIO — SIGNIFICANT CHANGE UP (ref 0.88–1.16)
LACTATE BLDV-MCNC: 1.6 MMOL/L — SIGNIFICANT CHANGE UP (ref 0.5–2)
LACTATE BLDV-MCNC: 2.4 MMOL/L — HIGH (ref 0.5–2)
LYMPHOCYTES # BLD AUTO: 0.38 K/UL — LOW (ref 1–3.3)
LYMPHOCYTES # BLD AUTO: 8.7 % — LOW (ref 13–44)
MACROCYTES BLD QL: SLIGHT — SIGNIFICANT CHANGE UP
MAGNESIUM SERPL-MCNC: 1.9 MG/DL — SIGNIFICANT CHANGE UP (ref 1.6–2.6)
MANUAL SMEAR VERIFICATION: SIGNIFICANT CHANGE UP
MCHC RBC-ENTMCNC: 33.1 GM/DL — SIGNIFICANT CHANGE UP (ref 32–36)
MCHC RBC-ENTMCNC: 33.3 GM/DL — SIGNIFICANT CHANGE UP (ref 32–36)
MCHC RBC-ENTMCNC: 33.5 PG — SIGNIFICANT CHANGE UP (ref 27–34)
MCHC RBC-ENTMCNC: 34.1 PG — HIGH (ref 27–34)
MCV RBC AUTO: 101.4 FL — HIGH (ref 80–100)
MCV RBC AUTO: 102.4 FL — HIGH (ref 80–100)
MONOCYTES # BLD AUTO: 0.35 K/UL — SIGNIFICANT CHANGE UP (ref 0–0.9)
MONOCYTES NFR BLD AUTO: 7.9 % — SIGNIFICANT CHANGE UP (ref 2–14)
MYELOCYTES NFR BLD: 0.9 % — HIGH (ref 0–0)
NEUTROPHILS # BLD AUTO: 3.57 K/UL — SIGNIFICANT CHANGE UP (ref 1.8–7.4)
NEUTROPHILS NFR BLD AUTO: 80.7 % — HIGH (ref 43–77)
NRBC # BLD: 0 /100 WBCS — SIGNIFICANT CHANGE UP (ref 0–0)
OVALOCYTES BLD QL SMEAR: SLIGHT — SIGNIFICANT CHANGE UP
PCO2 BLDV: 36 MMHG — LOW (ref 42–55)
PCO2 BLDV: 38 MMHG — LOW (ref 42–55)
PH BLDV: 7.32 — SIGNIFICANT CHANGE UP (ref 7.32–7.43)
PH BLDV: 7.33 — SIGNIFICANT CHANGE UP (ref 7.32–7.43)
PHOSPHATE SERPL-MCNC: 4.2 MG/DL — SIGNIFICANT CHANGE UP (ref 2.5–4.5)
PLAT MORPH BLD: ABNORMAL
PLATELET # BLD AUTO: 156 K/UL — SIGNIFICANT CHANGE UP (ref 150–400)
PLATELET # BLD AUTO: 187 K/UL — SIGNIFICANT CHANGE UP (ref 150–400)
PO2 BLDV: 18 MMHG — LOW (ref 25–45)
PO2 BLDV: 25 MMHG — SIGNIFICANT CHANGE UP (ref 25–45)
POIKILOCYTOSIS BLD QL AUTO: SLIGHT — SIGNIFICANT CHANGE UP
POTASSIUM BLDV-SCNC: 6.7 MMOL/L — CRITICAL HIGH (ref 3.5–5.1)
POTASSIUM BLDV-SCNC: 7.5 MMOL/L — CRITICAL HIGH (ref 3.5–5.1)
POTASSIUM SERPL-MCNC: 5.5 MMOL/L — HIGH (ref 3.5–5.3)
POTASSIUM SERPL-MCNC: 6.2 MMOL/L — CRITICAL HIGH (ref 3.5–5.3)
POTASSIUM SERPL-MCNC: 8 MMOL/L — CRITICAL HIGH (ref 3.5–5.3)
POTASSIUM SERPL-SCNC: 5.5 MMOL/L — HIGH (ref 3.5–5.3)
POTASSIUM SERPL-SCNC: 6.2 MMOL/L — CRITICAL HIGH (ref 3.5–5.3)
POTASSIUM SERPL-SCNC: 8 MMOL/L — CRITICAL HIGH (ref 3.5–5.3)
PROT SERPL-MCNC: 6.8 G/DL — SIGNIFICANT CHANGE UP (ref 6–8.3)
PROT SERPL-MCNC: 7.1 G/DL — SIGNIFICANT CHANGE UP (ref 6–8.3)
PROTHROM AB SERPL-ACNC: 13.4 SEC — SIGNIFICANT CHANGE UP (ref 10.5–13.4)
PROTHROM AB SERPL-ACNC: 13.4 SEC — SIGNIFICANT CHANGE UP (ref 10.5–13.4)
RBC # BLD: 3.31 M/UL — LOW (ref 4.2–5.8)
RBC # BLD: 3.61 M/UL — LOW (ref 4.2–5.8)
RBC # FLD: 14 % — SIGNIFICANT CHANGE UP (ref 10.3–14.5)
RBC # FLD: 14.1 % — SIGNIFICANT CHANGE UP (ref 10.3–14.5)
RBC BLD AUTO: ABNORMAL
RH IG SCN BLD-IMP: POSITIVE — SIGNIFICANT CHANGE UP
SAO2 % BLDV: 21.4 % — LOW (ref 67–88)
SAO2 % BLDV: 36.4 % — LOW (ref 67–88)
SODIUM SERPL-SCNC: 126 MMOL/L — LOW (ref 135–145)
SODIUM SERPL-SCNC: 130 MMOL/L — LOW (ref 135–145)
WBC # BLD: 4.42 K/UL — SIGNIFICANT CHANGE UP (ref 3.8–10.5)
WBC # BLD: 5.18 K/UL — SIGNIFICANT CHANGE UP (ref 3.8–10.5)
WBC # FLD AUTO: 4.42 K/UL — SIGNIFICANT CHANGE UP (ref 3.8–10.5)
WBC # FLD AUTO: 5.18 K/UL — SIGNIFICANT CHANGE UP (ref 3.8–10.5)

## 2023-05-10 PROCEDURE — 84132 ASSAY OF SERUM POTASSIUM: CPT

## 2023-05-10 PROCEDURE — 83605 ASSAY OF LACTIC ACID: CPT

## 2023-05-10 PROCEDURE — 85018 HEMOGLOBIN: CPT

## 2023-05-10 PROCEDURE — 82435 ASSAY OF BLOOD CHLORIDE: CPT

## 2023-05-10 PROCEDURE — 86850 RBC ANTIBODY SCREEN: CPT

## 2023-05-10 PROCEDURE — 86901 BLOOD TYPING SEROLOGIC RH(D): CPT

## 2023-05-10 PROCEDURE — 82330 ASSAY OF CALCIUM: CPT

## 2023-05-10 PROCEDURE — 82803 BLOOD GASES ANY COMBINATION: CPT

## 2023-05-10 PROCEDURE — 99285 EMERGENCY DEPT VISIT HI MDM: CPT

## 2023-05-10 PROCEDURE — 80053 COMPREHEN METABOLIC PANEL: CPT

## 2023-05-10 PROCEDURE — 84295 ASSAY OF SERUM SODIUM: CPT

## 2023-05-10 PROCEDURE — 85027 COMPLETE CBC AUTOMATED: CPT

## 2023-05-10 PROCEDURE — 85610 PROTHROMBIN TIME: CPT

## 2023-05-10 PROCEDURE — 86900 BLOOD TYPING SEROLOGIC ABO: CPT

## 2023-05-10 PROCEDURE — 71045 X-RAY EXAM CHEST 1 VIEW: CPT | Mod: 26

## 2023-05-10 PROCEDURE — 82947 ASSAY GLUCOSE BLOOD QUANT: CPT

## 2023-05-10 PROCEDURE — 99223 1ST HOSP IP/OBS HIGH 75: CPT

## 2023-05-10 PROCEDURE — 85730 THROMBOPLASTIN TIME PARTIAL: CPT

## 2023-05-10 PROCEDURE — 85014 HEMATOCRIT: CPT

## 2023-05-10 RX ORDER — GLUCAGON INJECTION, SOLUTION 0.5 MG/.1ML
1 INJECTION, SOLUTION SUBCUTANEOUS ONCE
Refills: 0 | Status: DISCONTINUED | OUTPATIENT
Start: 2023-05-10 | End: 2023-05-11

## 2023-05-10 RX ORDER — DEXTROSE 50 % IN WATER 50 %
25 SYRINGE (ML) INTRAVENOUS ONCE
Refills: 0 | Status: DISCONTINUED | OUTPATIENT
Start: 2023-05-10 | End: 2023-05-11

## 2023-05-10 RX ORDER — INSULIN LISPRO 100/ML
VIAL (ML) SUBCUTANEOUS
Refills: 0 | Status: DISCONTINUED | OUTPATIENT
Start: 2023-05-10 | End: 2023-05-11

## 2023-05-10 RX ORDER — ALBUTEROL 90 UG/1
10 AEROSOL, METERED ORAL ONCE
Refills: 0 | Status: COMPLETED | OUTPATIENT
Start: 2023-05-10 | End: 2023-05-10

## 2023-05-10 RX ORDER — INSULIN HUMAN 100 [IU]/ML
5 INJECTION, SOLUTION SUBCUTANEOUS ONCE
Refills: 0 | Status: COMPLETED | OUTPATIENT
Start: 2023-05-10 | End: 2023-05-10

## 2023-05-10 RX ORDER — LANOLIN ALCOHOL/MO/W.PET/CERES
3 CREAM (GRAM) TOPICAL AT BEDTIME
Refills: 0 | Status: DISCONTINUED | OUTPATIENT
Start: 2023-05-10 | End: 2023-05-11

## 2023-05-10 RX ORDER — DEXTROSE 50 % IN WATER 50 %
50 SYRINGE (ML) INTRAVENOUS ONCE
Refills: 0 | Status: COMPLETED | OUTPATIENT
Start: 2023-05-10 | End: 2023-05-10

## 2023-05-10 RX ORDER — FUROSEMIDE 40 MG
40 TABLET ORAL DAILY
Refills: 0 | Status: DISCONTINUED | OUTPATIENT
Start: 2023-05-10 | End: 2023-05-10

## 2023-05-10 RX ORDER — ATORVASTATIN CALCIUM 80 MG/1
1 TABLET, FILM COATED ORAL
Qty: 0 | Refills: 0 | DISCHARGE

## 2023-05-10 RX ORDER — DEXTROSE 50 % IN WATER 50 %
15 SYRINGE (ML) INTRAVENOUS ONCE
Refills: 0 | Status: DISCONTINUED | OUTPATIENT
Start: 2023-05-10 | End: 2023-05-11

## 2023-05-10 RX ORDER — SODIUM ZIRCONIUM CYCLOSILICATE 10 G/10G
10 POWDER, FOR SUSPENSION ORAL ONCE
Refills: 0 | Status: COMPLETED | OUTPATIENT
Start: 2023-05-10 | End: 2023-05-10

## 2023-05-10 RX ORDER — DEXTROSE 50 % IN WATER 50 %
12.5 SYRINGE (ML) INTRAVENOUS ONCE
Refills: 0 | Status: DISCONTINUED | OUTPATIENT
Start: 2023-05-10 | End: 2023-05-11

## 2023-05-10 RX ORDER — ALPRAZOLAM 0.25 MG
1 TABLET ORAL THREE TIMES A DAY
Refills: 0 | Status: DISCONTINUED | OUTPATIENT
Start: 2023-05-10 | End: 2023-05-11

## 2023-05-10 RX ORDER — SODIUM CHLORIDE 9 MG/ML
1000 INJECTION, SOLUTION INTRAVENOUS
Refills: 0 | Status: DISCONTINUED | OUTPATIENT
Start: 2023-05-10 | End: 2023-05-11

## 2023-05-10 RX ORDER — AMIODARONE HYDROCHLORIDE 400 MG/1
200 TABLET ORAL DAILY
Refills: 0 | Status: DISCONTINUED | OUTPATIENT
Start: 2023-05-10 | End: 2023-05-11

## 2023-05-10 RX ORDER — SODIUM ZIRCONIUM CYCLOSILICATE 10 G/10G
5 POWDER, FOR SUSPENSION ORAL ONCE
Refills: 0 | Status: COMPLETED | OUTPATIENT
Start: 2023-05-10 | End: 2023-05-10

## 2023-05-10 RX ORDER — ACETAMINOPHEN 500 MG
650 TABLET ORAL EVERY 6 HOURS
Refills: 0 | Status: DISCONTINUED | OUTPATIENT
Start: 2023-05-10 | End: 2023-05-11

## 2023-05-10 RX ADMIN — SODIUM ZIRCONIUM CYCLOSILICATE 5 GRAM(S): 10 POWDER, FOR SUSPENSION ORAL at 13:32

## 2023-05-10 RX ADMIN — ALBUTEROL 10 MILLIGRAM(S): 90 AEROSOL, METERED ORAL at 11:12

## 2023-05-10 RX ADMIN — SODIUM ZIRCONIUM CYCLOSILICATE 10 GRAM(S): 10 POWDER, FOR SUSPENSION ORAL at 19:39

## 2023-05-10 RX ADMIN — INSULIN HUMAN 5 UNIT(S): 100 INJECTION, SOLUTION SUBCUTANEOUS at 11:12

## 2023-05-10 RX ADMIN — Medication 50 MILLILITER(S): at 11:11

## 2023-05-10 NOTE — H&P ADULT - PROBLEM SELECTOR PLAN 4
w/o esoph varices s/p banding    LFTs elevated but may be due to a component of dehydration  Pt is adamant that he does not want any paracenteses here and wants to be discharged in time for his [facility] appt on Monday.    Hepatology consult for medication management. w/o esoph varices s/p banding    LFTs elevated but may be due to a component of dehydration  Pt is adamant that he does not want any paracenteses here and wants to be discharged in time for his [facility] appt on Monday.

## 2023-05-10 NOTE — H&P ADULT - PROBLEM SELECTOR PLAN 2
Cr 1.3 from 0.9  Likely pre-renal as he has been having high volume paracentesis while on diuretics.  Hold nephrotoxic drugs.   Send Urine Electrolytes, pr/cr ratio  Will hold off on Renal US for now and also hold fluids as he feels that fluid is building up in his belly

## 2023-05-10 NOTE — PRE PROCEDURE NOTE - PRE PROCEDURE EVALUATION
Vascular & Interventional Radiology    HPI: 67y Male with hx of [employer] AICD implant in 2014 with new generator placement 12/2022 2/2 NICM, HTN, HLD, T2DM, esophageal varices s/p banding, ETOH induced hepatic cirrhosis with recurrent ascites requiring frequent LVPs every 3 - 7 days (6-7 L) for the past 5 months (last 1/3/23 6.9 L). Reports last drink was Spring 2022. He denies hx of SBP. Lives alone & states he is self sufficient conducting all ADLs. States he has help available - friends nearby - should he need it. No close family nearby. Reports quality of life is compromised 2/2 recurrent ascites requiring frequent LVPs. He was initially referred to IR for TIPS eval but sent to us for evaluation for HCC treatment and establishment of OLT candidacy    Colonoscopy 8/2022 - stool in colon, small polyp 5mm, rectal varices, internal hemorrhoids. EGD - EV banded and eradicated, PHG with gastritis, duodenal nodule.    Allergies: No Known Allergies    Data:  T(C): 36.4  HR: 80  BP: 138/74  RR: 16  SpO2: 100%    Exam  Calm, resting comfortably, NAD, normal respirations.    -WBC 5.18 / HgB 12.1 / Hct 36.6 / Plt 187  -Na 130 / Cl 99 / BUN 39 / Glucose 144  -K 6.2 / CO2 20 / Cr 1.30  -ALT 60 / Alk Phos 163 / T.Bili 0.7  -INR1.16    Plan:   - 67y Male presents for catheter hepatic angiogram  - Informed consent obtained.

## 2023-05-10 NOTE — ED PROVIDER NOTE - PROGRESS NOTE DETAILS
Pt endorsed to Dr. Celis for admission, given downtrending K+ and no ekg changes requesting medicine floor. - NYLA QuiñonezC

## 2023-05-10 NOTE — ED ADULT NURSE NOTE - NSFALLUNIVINTERV_ED_ALL_ED
Bed/Stretcher in lowest position, wheels locked, appropriate side rails in place/Call bell, personal items and telephone in reach/Instruct patient to call for assistance before getting out of bed/chair/stretcher/Non-slip footwear applied when patient is off stretcher/[place] to call system/Physically safe environment - no spills, clutter or unnecessary equipment/Purposeful proactive rounding/Room/bathroom lighting operational, light cord in reach

## 2023-05-10 NOTE — ED PROVIDER NOTE - ATTENDING APP SHARED VISIT CONTRIBUTION OF CARE
All the documentation in HPI/ROS/PE/MDM is done by myself, Dr. Ashley.     I, Gagandeep Ashley, DO personally saw the patient with NATE.  I have personally performed a face to face diagnostic evaluation on this patient.  I have reviewed the NATE note and agree with the history, exam, and plan of care, except as noted.  I personally saw the patient and performed a substantive portion of the visit including all aspects of the medical decision making.

## 2023-05-10 NOTE — ED PROVIDER NOTE - OBJECTIVE STATEMENT
67-year-old male with past medical history of liver cell carcinoma, ascites, hyperlipidemia, diabetes, nonischemic cardiomyopathy, hypertension sent in for elevated potassium.  No history of kidney issues.  States he has been eating a banana daily now which she was not doing couple weeks ago.  Also mention he was started on a new medication recently not sure about the name.  Upon review of chart noted patient was started on spironolactone about 3 weeks ago.  Patient reports he is in his baseline health at this time–no complaints.  Denies chest pain, shortness of breath, abdominal pain, nausea, vomiting, diarrhea.  Denies any numbness or tingling in his extremities.

## 2023-05-10 NOTE — ED PROVIDER NOTE - PHYSICAL EXAMINATION
No acute distress.  No respiratory distress noted.  Abdomen soft, distended, nontender. No pedal edema

## 2023-05-10 NOTE — ASU PREOP CHECKLIST - SPO2 (%)
Nicki 15, 2018      Елена Kam MD  [address]  [address]  [facility] [address]           [facility]ology Oncology  [address] [address]  Phone: [phone]          Patient: Jeanne Rodgers   MR Number: [id number]   YOB: 1953   Date of Visit: 6/14/2018       Dear Dr. Елена Kam:    Thank you for referring Jeanne Rodgers to me for evaluation. Attached you will find relevant portions of my assessment and plan of care.    If you have questions, please do not hesitate to call me. I look forward to following Jeanne Rodgers along with you.    Sincerely,    Nano Ford MD    Enclosure  CC:  No Recipients    If you would like to receive this communication electronically, please contact [email or web address] or [phone] to request more information on [facility] access.    For providers and/or their staff who would like to refer a patient to [facility], please contact us through our one-stop-shop provider referral line, [facility], at [phone].    If you feel you have received this communication in error or would no longer like to receive these types of communications, please e-mail [email or web address]          100

## 2023-05-10 NOTE — H&P ADULT - ASSESSMENT
67y Male with hx of [employer] AICD implant in 2014 with new generator placement 12/2022 2/2 NICM, HTN, HLD, T2DM, esophageal varices s/p banding, ETOH induced hepatic cirrhosis with recurrent ascites requiring frequent LVPs every 3 - 7 days (6-7 L) recently admitted for decompensated cirrhosis who presented to IR today for hepatic angiogram and was found to have hyperkalemia and sent to the ED.

## 2023-05-10 NOTE — H&P ADULT - PROBLEM SELECTOR PLAN 5
supposed to have hepatic angiogram today - d/w IR  Can reschedule for Friday if discharged  Pt must call [phone] to reschedule

## 2023-05-10 NOTE — ASU PATIENT PROFILE, ADULT - FALL HARM RISK - UNIVERSAL INTERVENTIONS
Bed in lowest position, wheels locked, appropriate side rails in place/Call bell, personal items and telephone in reach/Instruct patient to call for assistance before getting out of bed or chair/Non-slip footwear when patient is out of bed/[place] to call system/Physically safe environment - no spills, clutter or unnecessary equipment/Purposeful Proactive Rounding/Room/bathroom lighting operational, light cord in reach

## 2023-05-10 NOTE — H&P ADULT - PROBLEM SELECTOR PLAN 1
likely medication induced coupled with AUSTIN from meds + high volume paracentesis    DC Spironolactone  Hold Lasix  Continue Bactrim for SBP ppx - likely also contributed to K  discussed Bananas with the pt, may need dietary modification  K is now 5.5, repeat in the AM likely medication induced coupled with [place] from meds + high volume paracentesis  no EKG changes, give Lokelma and Insulin, will give 1 more dose of Lokelma  DC Spironolactone  Hold Lasix  Continue Bactrim for SBP ppx - likely also contributed to K  discussed Bananas with the pt, may need dietary modification  K is now 5.5, repeat in the AM

## 2023-05-10 NOTE — H&P ADULT - HISTORY OF PRESENT ILLNESS
67y Male with hx of [employer] AICD implant in 2014 with new generator placement 12/2022 2/2 NICM, HTN, HLD, T2DM, esophageal varices s/p banding, ETOH induced hepatic cirrhosis with recurrent ascites requiring frequent LVPs every 3 - 7 days (6-7 L) recently admitted for decompensated cirrhosis who presented to IR today for hepatic angiogram and was found to have hyperkalemia and sent to the ED. The patient says he eats bananas daily and was started on a new medication a few weeks ago. Per med rec he was started on Spironolactone as well as Bactrim for SBP ppx. He otherwise feels well and is hungry. He would like to be discharged asap so he can get his weekly paracentesis at [facility].  67y Male with hx of [employer] AICD implant in 2014 with new generator placement 12/2022 2/2 NICM, HTN, HLD, T2DM, esophageal varices s/p banding, ETOH induced hepatic cirrhosis with recurrent ascites requiring frequent LVPs every 3 - 7 days (6-7 L) recently admitted for decompensated cirrhosis who presented to IR today for hepatic angiogram and was found to have hyperkalemia and sent to the ED. The patient says he eats bananas daily and was started on a new medication a few weeks ago. Per med rec he was started on Spironolactone as well as Bactrim for SBP ppx. He otherwise feels well and is hungry. He would like to be discharged asap so he can get his weekly paracentesis at [facility]. His last paracentesis was on Monday when 11L were removed and he was given albumin simultaneously.

## 2023-05-10 NOTE — ED ADULT NURSE NOTE - OBJECTIVE STATEMENT
Patient    is alert  and oriented x3.  Color is good and skin warm to touch. He  is  c/o abdominal pain.  He is  sent here for elevated potassium.  He  denies  chest pain.

## 2023-05-10 NOTE — H&P ADULT - NSHPLABSRESULTS_GEN_ALL_CORE
K 5.5 from a peak of 7.5    Hyponatremia of 126, baseline of 130    Cr 1.31, baseline is 0.91  BUN elevated at 39    ALP elevated at 129  AST/ALT 96/59      CXR: no acute findings

## 2023-05-10 NOTE — ASU DISCHARGE PLAN (ADULT/PEDIATRIC) - ASU DC SPECIAL INSTRUCTIONSFT
Hepatic angiogram/mapping discharge instructions:    - There may be a small area of bruising around the groin site.                   - You may resume your normal diet.  - You may resume your normal medications as instructed.  - Do not drive, engage in heavy lifting or strenuous activity, or drink any alcoholic beverages for the next 24 hours.     If you have a problem that you believe requires IMMEDIATE attention, please go to your NEAREST Emergency Room.    Notify your primary physician and/or Interventional Radiology IMMEDIATELY if you experience any of the following       - Fever of 101F or 38C       - Chills or Rigors/ Shakes       - Worsening Pain       - Blood soaked bandages or worsening bleeding       - Lightheadedness and/or dizziness upon standing       - Chest Pain/ Tightness       - Shortness of Breath       - Difficulty walking    Follow Up Instructions:   - Please call the IR Office at [phone] with any questions about your upcoming radioembolization (Y90) treatment.

## 2023-05-10 NOTE — PATIENT PROFILE ADULT - FALL HARM RISK - HARM RISK INTERVENTIONS

## 2023-05-11 ENCOUNTER — TRANSCRIPTION ENCOUNTER (OUTPATIENT)
Age: 68
End: 2023-05-11

## 2023-05-11 VITALS
TEMPERATURE: 98 F | DIASTOLIC BLOOD PRESSURE: 79 MMHG | HEART RATE: 81 BPM | SYSTOLIC BLOOD PRESSURE: 162 MMHG | OXYGEN SATURATION: 98 % | RESPIRATION RATE: 18 BRPM

## 2023-05-11 LAB
ALBUMIN SERPL ELPH-MCNC: 3.1 G/DL — LOW (ref 3.3–5)
ALP SERPL-CCNC: 126 U/L — HIGH (ref 40–120)
ALT FLD-CCNC: 51 U/L — HIGH (ref 10–45)
ANION GAP SERPL CALC-SCNC: 10 MMOL/L — SIGNIFICANT CHANGE UP (ref 5–17)
AST SERPL-CCNC: 55 U/L — HIGH (ref 10–40)
BASOPHILS # BLD AUTO: 0.04 K/UL — SIGNIFICANT CHANGE UP (ref 0–0.2)
BASOPHILS NFR BLD AUTO: 0.9 % — SIGNIFICANT CHANGE UP (ref 0–2)
BILIRUB SERPL-MCNC: 0.6 MG/DL — SIGNIFICANT CHANGE UP (ref 0.2–1.2)
BUN SERPL-MCNC: 35 MG/DL — HIGH (ref 7–23)
CALCIUM SERPL-MCNC: 8.7 MG/DL — SIGNIFICANT CHANGE UP (ref 8.4–10.5)
CHLORIDE SERPL-SCNC: 101 MMOL/L — SIGNIFICANT CHANGE UP (ref 96–108)
CHOLEST SERPL-MCNC: 101 MG/DL — SIGNIFICANT CHANGE UP
CO2 SERPL-SCNC: 18 MMOL/L — LOW (ref 22–31)
CREAT SERPL-MCNC: 1.31 MG/DL — HIGH (ref 0.5–1.3)
EGFR: 60 ML/MIN/1.73M2 — SIGNIFICANT CHANGE UP
EOSINOPHIL # BLD AUTO: 0.08 K/UL — SIGNIFICANT CHANGE UP (ref 0–0.5)
EOSINOPHIL NFR BLD AUTO: 1.8 % — SIGNIFICANT CHANGE UP (ref 0–6)
GLUCOSE BLDC GLUCOMTR-MCNC: 223 MG/DL — HIGH (ref 70–99)
GLUCOSE SERPL-MCNC: 130 MG/DL — HIGH (ref 70–99)
HCT VFR BLD CALC: 30.5 % — LOW (ref 39–50)
HDLC SERPL-MCNC: 60 MG/DL — SIGNIFICANT CHANGE UP
HGB BLD-MCNC: 10.2 G/DL — LOW (ref 13–17)
IMM GRANULOCYTES NFR BLD AUTO: 2 % — HIGH (ref 0–0.9)
INR BLD: 1.2 RATIO — HIGH (ref 0.88–1.16)
LIPID PNL WITH DIRECT LDL SERPL: 34 MG/DL — SIGNIFICANT CHANGE UP
LYMPHOCYTES # BLD AUTO: 0.4 K/UL — LOW (ref 1–3.3)
LYMPHOCYTES # BLD AUTO: 9.1 % — LOW (ref 13–44)
MCHC RBC-ENTMCNC: 33.4 GM/DL — SIGNIFICANT CHANGE UP (ref 32–36)
MCHC RBC-ENTMCNC: 33.9 PG — SIGNIFICANT CHANGE UP (ref 27–34)
MCV RBC AUTO: 101.3 FL — HIGH (ref 80–100)
MONOCYTES # BLD AUTO: 0.58 K/UL — SIGNIFICANT CHANGE UP (ref 0–0.9)
MONOCYTES NFR BLD AUTO: 13.2 % — SIGNIFICANT CHANGE UP (ref 2–14)
NEUTROPHILS # BLD AUTO: 3.22 K/UL — SIGNIFICANT CHANGE UP (ref 1.8–7.4)
NEUTROPHILS NFR BLD AUTO: 73 % — SIGNIFICANT CHANGE UP (ref 43–77)
NON HDL CHOLESTEROL: 41 MG/DL — SIGNIFICANT CHANGE UP
NRBC # BLD: 0 /100 WBCS — SIGNIFICANT CHANGE UP (ref 0–0)
PLATELET # BLD AUTO: 152 K/UL — SIGNIFICANT CHANGE UP (ref 150–400)
POTASSIUM SERPL-MCNC: 5 MMOL/L — SIGNIFICANT CHANGE UP (ref 3.5–5.3)
POTASSIUM SERPL-SCNC: 5 MMOL/L — SIGNIFICANT CHANGE UP (ref 3.5–5.3)
PROT SERPL-MCNC: 6 G/DL — SIGNIFICANT CHANGE UP (ref 6–8.3)
PROTHROM AB SERPL-ACNC: 13.8 SEC — HIGH (ref 10.5–13.4)
RBC # BLD: 3.01 M/UL — LOW (ref 4.2–5.8)
RBC # FLD: 14.1 % — SIGNIFICANT CHANGE UP (ref 10.3–14.5)
SODIUM SERPL-SCNC: 129 MMOL/L — LOW (ref 135–145)
TRIGL SERPL-MCNC: 35 MG/DL — SIGNIFICANT CHANGE UP
WBC # BLD: 4.41 K/UL — SIGNIFICANT CHANGE UP (ref 3.8–10.5)
WBC # FLD AUTO: 4.41 K/UL — SIGNIFICANT CHANGE UP (ref 3.8–10.5)

## 2023-05-11 PROCEDURE — 99285 EMERGENCY DEPT VISIT HI MDM: CPT | Mod: 25

## 2023-05-11 PROCEDURE — 85014 HEMATOCRIT: CPT

## 2023-05-11 PROCEDURE — 94640 AIRWAY INHALATION TREATMENT: CPT

## 2023-05-11 PROCEDURE — 83605 ASSAY OF LACTIC ACID: CPT

## 2023-05-11 PROCEDURE — 83735 ASSAY OF MAGNESIUM: CPT

## 2023-05-11 PROCEDURE — 84100 ASSAY OF PHOSPHORUS: CPT

## 2023-05-11 PROCEDURE — 84295 ASSAY OF SERUM SODIUM: CPT

## 2023-05-11 PROCEDURE — 82435 ASSAY OF BLOOD CHLORIDE: CPT

## 2023-05-11 PROCEDURE — 82330 ASSAY OF CALCIUM: CPT

## 2023-05-11 PROCEDURE — 80061 LIPID PANEL: CPT

## 2023-05-11 PROCEDURE — 85610 PROTHROMBIN TIME: CPT

## 2023-05-11 PROCEDURE — 85025 COMPLETE CBC W/AUTO DIFF WBC: CPT

## 2023-05-11 PROCEDURE — 85730 THROMBOPLASTIN TIME PARTIAL: CPT

## 2023-05-11 PROCEDURE — 82803 BLOOD GASES ANY COMBINATION: CPT

## 2023-05-11 PROCEDURE — 85018 HEMOGLOBIN: CPT

## 2023-05-11 PROCEDURE — 82962 GLUCOSE BLOOD TEST: CPT

## 2023-05-11 PROCEDURE — 80053 COMPREHEN METABOLIC PANEL: CPT

## 2023-05-11 PROCEDURE — 82565 ASSAY OF CREATININE: CPT

## 2023-05-11 PROCEDURE — 84132 ASSAY OF SERUM POTASSIUM: CPT

## 2023-05-11 PROCEDURE — 71045 X-RAY EXAM CHEST 1 VIEW: CPT

## 2023-05-11 PROCEDURE — 82947 ASSAY GLUCOSE BLOOD QUANT: CPT

## 2023-05-11 PROCEDURE — 99239 HOSP IP/OBS DSCHRG MGMT >30: CPT

## 2023-05-11 RX ADMIN — Medication 2: at 12:36

## 2023-05-11 RX ADMIN — Medication 1 TABLET(S): at 12:40

## 2023-05-11 RX ADMIN — AMIODARONE HYDROCHLORIDE 200 MILLIGRAM(S): 400 TABLET ORAL at 05:05

## 2023-05-11 NOTE — CHART NOTE - NSCHARTNOTEFT_GEN_A_CORE
IR plans for Mapping angiogram on 5/15. Pt can be discharged if medically cleared by primary team and follow up as out pt on 5/15. d/w MICHAEL Barnett.

## 2023-05-11 NOTE — DISCHARGE NOTE PROVIDER - NSDCCPCAREPLAN_GEN_ALL_CORE_FT
PRINCIPAL DISCHARGE DIAGNOSIS  Diagnosis: Hyperkalemia  Assessment and Plan of Treatment: Improved status post treatment with insulin + dextrose + lokelma.  Follow-up with your primary care physician within 1 week. Call for appointment.  Please bring all discharge paperwork and list of medications to all follow up appointments  Please call for follow up appointments one day after discharge        SECONDARY DISCHARGE DIAGNOSES  Diagnosis: HCC (hepatocellular carcinoma)  Assessment and Plan of Treatment: Please follow up with IR for Mapping angiogram on 5/15/23 as outpatient.    Diagnosis: AUSTIN (acute kidney injury)  Assessment and Plan of Treatment: Avoid taking NSAIDs (ex: Ibuprofen, Advil, Celebrex, Naprosyn) and other agents that can harm the kidneys such as intravenous contrast for diagnostic testing, combination cold medications, etc. until you are instructed to do so by your Primary Care Physician.  Have all of your medications adjusted for your renal function by your Health Care Provider.  Blood pressure control is important.  Take all medication as prescribed.  Do not overconsume foods that are high in potassium, such as bananas, until you are instructed to do so by your primary care physician.      Diagnosis: Hypertension  Assessment and Plan of Treatment: Continue to follow a low salt/sodium diet.  Perform physical activities as tolerated in consultation with your Primary Care Provider and physical therapist.  Take all medications as prescribed.  Follow up with your medical doctor for routine blood pressure monitoring at your next visit.  Notify your doctor if you have any of the following symptoms:  Dizziness, lightheadedness, blurry vision, headache, chest pain, or shortness of breath.      Diagnosis: Alcoholic cirrhosis  Assessment and Plan of Treatment: Please continue close follow up with your hepatologist for continued management.    Diagnosis: Hyperlipidemia  Assessment and Plan of Treatment: Low salt, low fat, low cholesterol, diabetic diet   Continue medication as prescribed  Exercise, increase your activity level    Diagnosis: Diabetes mellitus  Assessment and Plan of Treatment: Make sure you get your HgA1c checked every three months.  If you take oral diabetes medications, check your blood glucose at least two times a day.  If you take short-acting insulin, check your blood glucose before meals and at bedtime.  It's important not to skip any meals.  Keep a log of your blood glucose results and always take it with you to your doctor appointments.  Keep a list of your current medications including over the counter medications and bring this medication list with you to all your doctor appointments.  If you have not seen your ophthalmologist this year, call for appointment.  Check your feet daily for redness, sores, or openings.  Do not self treat.  If there is no improvement in two days, call your primary care physician for an appointment.  HgA1c was 6.5 on 4/6/23

## 2023-05-11 NOTE — DISCHARGE NOTE PROVIDER - PROVIDER TOKENS
PROVIDER:[TOKEN:[[id number]],FOLLOWUP:[1 week]] PROVIDER:[TOKEN:[[id number]],FOLLOWUP:[1 week]],PROVIDER:[TOKEN:[[id number]:[id number]]]

## 2023-05-11 NOTE — DISCHARGE NOTE NURSING/CASE MANAGEMENT/SOCIAL WORK - PATIENT PORTAL LINK FT
You can access the FollowMyHealth Patient Portal offered by [facility] by registering at the following website: [email or web address]. By joining [facility]’s FollowMyHealth portal, you will also be able to view your health information using other applications (apps) compatible with our system.

## 2023-05-11 NOTE — DISCHARGE NOTE PROVIDER - ATTENDING DISCHARGE PHYSICAL EXAMINATION:
Saw and examined patient.  He is adamant about leaving today   Y90 mapping rescheduled for Monday  Pt needs to f/u with outpatient hepatology for diuretics and sbp ppx dosing and BMP for renal function Saw and examined patient.  He is adamant about leaving today   Y90 mapping rescheduled for Monday which patient doesn't want due to conflict with his para appointment, after long discussion (I offered para here or to stay here for procedure and optimize renal function/medication/para he adamantly declines this) he will reschedule with the IR office so that he can keep his para appointment, PMD appointment and Oncology appointment which are scheduled for Monday.   Discussed w/ outpatient hepatology they will order for lab check tomorrow then decided about diuretics and sbp ppx dosing prior to procedure. Will continue Lasix and hold bactrim and spironolactone per our discussion. He will get labs drawn tomorrow.  As a back up he has an appointment with his PMD on MOnday who will draw his labs as well. Reviewed this plan in detail with him and he understands.

## 2023-05-11 NOTE — DISCHARGE NOTE PROVIDER - HOSPITAL COURSE
67 yeMale with hx of [employer] AICD implant in 2014 with new generator placement 12/2022 2/2 NICM, HTN, HLD, T2DM, esophageal varices s/p banding, ETOH induced hepatic cirrhosis with recurrent ascites requiring frequent LVPs every 3 - 7 days (6-7 L) recently admitted for decompensated cirrhosis presented to IR on 5/10 for hepatic angiogram and was found to have hyperkalemia, thus sent to the ED. The patient endorsed being started on a new medication a few weeks ago. Per med rec he was started on Spironolactone as well as Bactrim for SBP prophylaxis    Patient was admitted for further medical management. Hyperkalemia likely medication induced coupled with AUSTIN from meds + high volume paracentesis. No EKG changes noted. Patient was given Lokelma and Insulin with potassium level improving to 5.0 from 8.0. Spironolactone was discontinued. Labwork notable for Cr 1.3 from 0.9 - likely pre-renal as he has been having high volume paracentesis while on diuretics. Patient also hyponatremic likely from solute depletion. LFTs elevated but may be due to a component of dehydration. Patient adamant that he does not want any paracenteses here and wants to be discharged in time for his [facility] appt on Monday 5/15/23. IR plans for Mapping angiogram on 5/15 as outpatient.    Discharge/Dispo/Med rec discussed with attending  ____. Patient medically cleared for discharge ____ with outpatient follow up with IR and [facility] for paracentesis. 67 yeMale with hx of [employer] AICD implant in 2014 with new generator placement 12/2022 2/2 NICM, HTN, HLD, T2DM, esophageal varices s/p banding, ETOH induced hepatic cirrhosis with recurrent ascites requiring frequent LVPs every 3 - 7 days (6-7 L) recently admitted for decompensated cirrhosis presented to IR on 5/10 for hepatic angiogram and was found to have hyperkalemia, thus sent to the ED. The patient endorsed being started on a new medication a few weeks ago. Per med rec he was started on Spironolactone as well as Bactrim for SBP prophylaxis    Patient was admitted for further medical management. Hyperkalemia likely medication induced coupled with AUSTIN from meds + high volume paracentesis. No EKG changes noted. Patient was given Lokelma, dextrose, and Insulin with potassium level improving to 5.0 from 8.0. Spironolactone was discontinued. Labwork notable for Cr 1.3 from 0.9 - likely pre-renal as he has been having high volume paracentesis while on diuretics. Patient also hyponatremic likely from solute depletion. LFTs elevated but may be due to a component of dehydration. Patient adamant that he does not want any paracenteses here and wants to be discharged in time for his [facility] appt on Monday 5/15/23. IR plans for Mapping angiogram on 5/15 as outpatient.    Discharge/Dispo/Med rec discussed with attending  ____. Patient medically cleared for discharge ____ with outpatient follow up with IR and [facility] for paracentesis. 67 year old Male with hx of [employer] AICD implant in 2014 with new generator placement 12/2022 2/2 NICM, HTN, HLD, T2DM, esophageal varices s/p banding, ETOH induced hepatic cirrhosis with recurrent ascites requiring frequent LVPs every 3 - 7 days (6-7 L) recently admitted for decompensated cirrhosis presented to IR on 5/10 for hepatic angiogram and was found to have hyperkalemia, thus sent to the ED. The patient endorsed being started on a new medication a few weeks ago. Per med rec he was started on Spironolactone as well as Bactrim for SBP prophylaxis    Patient was admitted for further medical management. Hyperkalemia likely medication induced coupled with AUSTIN from meds + high volume paracentesis. No EKG changes noted. Patient was given Lokelma, dextrose, and Insulin with potassium level improving to 5.0 from 8.0. Spironolactone was discontinued. Labwork notable for Cr 1.3 from 0.9 - likely pre-renal as he has been having high volume paracentesis while on diuretics. Patient also hyponatremic likely from solute depletion. LFTs elevated but may be due to a component of dehydration. Patient adamant that he does not want any paracenteses here and wants to be discharged in time for his [facility] appt on Monday 5/15/23. IR plans for Mapping angiogram on 5/15 as outpatient.    Discharge/Dispo/Med rec discussed with attending  ____. Patient medically cleared for discharge ____ with outpatient follow up with IR and [facility] for paracentesis. 67 year old Male with hx of [employer] AICD implant in 2014 with new generator placement 12/2022 2/2 NICM, HTN, HLD, T2DM, esophageal varices s/p banding, ETOH induced hepatic cirrhosis with recurrent ascites requiring frequent LVPs every 3 - 7 days (6-7 L) recently admitted for decompensated cirrhosis presented to IR on 5/10 for hepatic angiogram and was found to have hyperkalemia, thus sent to the ED. The patient endorsed being started on a new medication a few weeks ago. Per med rec he was started on Spironolactone as well as Bactrim for SBP prophylaxis    Patient was admitted for further medical management. Hyperkalemia likely medication induced coupled with AUSTIN from meds + high volume paracentesis. No EKG changes noted. Patient was given Lokelma, dextrose, and Insulin with potassium level improving to 5.0 from 8.0. Spironolactone was discontinued. Labwork notable for Cr 1.3 from 0.9 -  Patient also hyponatremic likely from solute depletion, volume overload w/ ascites and his cirrhosis.  LFTs elevated but may be due to a component of dehydration. Patient adamant that he does not want any paracenteses here and wants to be discharged in time for his [facility] appt on Monday 5/15/23. IR plans for Mapping angiogram on 5/15 as outpatient.    Discharge/Dispo/Med rec discussed with attending  ____. Patient medically cleared for discharge ____ with outpatient follow up with IR and [facility] for paracentesis. 67 year old Male with hx of [employer] AICD implant in 2014 with new generator placement 12/2022 2/2 NICM, HTN, HLD, T2DM, esophageal varices s/p banding, ETOH induced hepatic cirrhosis with recurrent ascites requiring frequent LVPs every 3 - 7 days (6-7 L) recently admitted for decompensated cirrhosis presented to IR on 5/10 for hepatic angiogram and was found to have hyperkalemia, thus sent to the ED. The patient endorsed being started on a new medication a few weeks ago. Per med rec he was started on Spironolactone as well as Bactrim for SBP prophylaxis    Patient was admitted for further medical management. Hyperkalemia likely medication induced coupled with AUSTIN from meds + high volume paracentesis. No EKG changes noted. Patient was given Lokelma, dextrose, and Insulin with potassium level improving to 5.0 from 8.0. Spironolactone was discontinued. Labwork notable for Cr 1.3 from 0.9 -  Patient also hyponatremic likely from solute depletion, volume overload w/ ascites and his cirrhosis.  LFTs elevated but may be due to a component of dehydration. Patient adamant that he does not want any paracenteses here and wants to be discharged in time for his [facility] appt on Monday 5/15/23. IR plans for Mapping angiogram on 5/15 as outpatient.    Discharge/Dispo/Med rec discussed with attending Dr. Jeff Patient medically cleared for discharge 5/11  with outpatient follow up with IR and [facility] for paracentesis.

## 2023-05-11 NOTE — DISCHARGE NOTE PROVIDER - CARE PROVIDER_API CALL
[facility]  Internal Medicine  [address] [place] [address]  Phone: [phone]  Fax: [phone]  Follow Up Time: 1 week   [facility]  Internal Medicine  [address] [address]  Phone: [phone]  Fax: [phone]  Follow Up Time: 1 week    Aniket Loza)  Gastroenterology; Internal Medicine; Transplant Hepatology  [address] [place] [id number]  Phone: [phone]  Fax: [phone]  Follow Up Time:

## 2023-05-11 NOTE — DISCHARGE NOTE PROVIDER - NSDCPNSUBOBJ_GEN_ALL_CORE
#Hyperkalmeia present on admission  #AUSTIN  present on admission  #Decompensated hepatic cirrhosis   #VT on Amiodarone

## 2023-05-11 NOTE — DISCHARGE NOTE NURSING/CASE MANAGEMENT/SOCIAL WORK - NSDCFUADDAPPT_GEN_ALL_CORE_FT
APPTS ARE READY TO BE MADE: [x] YES    [facility] or Patient Contact (if needed):    Additional Information about above appointments (if needed):    1: Dr. Loza within 1 month  2:   3:     Other comments or requests:

## 2023-05-11 NOTE — DISCHARGE NOTE PROVIDER - NSDCMRMEDTOKEN_GEN_ALL_CORE_FT
ALPRAZOLAM .5 MG TABS: 1  orally 3 times a day, As Needed  METFORMIN HYDROCHLORIDE 500 MG TABS: 1  orally 2 times a day  Pacerone 200 mg oral tablet: 1 tab(s) orally once a day  spironolactone 25 mg oral tablet: 1 tab(s) orally once a day  sulfamethoxazole-trimethoprim 800 mg-160 mg oral tablet: 1 tab(s) orally once a day   ALPRAZOLAM .5 MG TABS: 1  orally 3 times a day, As Needed  METFORMIN HYDROCHLORIDE 500 MG TABS: 1  orally 2 times a day  Pacerone 200 mg oral tablet: 1 tab(s) orally once a day  sulfamethoxazole-trimethoprim 800 mg-160 mg oral tablet: 1 tab(s) orally once a day   ALPRAZOLAM .5 MG TABS: 1  orally 3 times a day, As Needed  Lasix 20 mg oral tablet: 1 orally once a day  METFORMIN HYDROCHLORIDE 500 MG TABS: 1  orally 2 times a day  Pacerone 200 mg oral tablet: 1 tab(s) orally once a day

## 2023-05-11 NOTE — DISCHARGE NOTE PROVIDER - NSDCFUADDAPPT_GEN_ALL_CORE_FT
APPTS ARE READY TO BE MADE: [x] YES    Best Family or Patient Contact (if needed):    Additional Information about above appointments (if needed):    1:   2:   3:     Other comments or requests:    APPTS ARE READY TO BE MADE: [x] YES    Best Family or Patient Contact (if needed):    Additional Information about above appointments (if needed):    1: Dr. Loza within 1 month  2:   3:     Other comments or requests:    APPTS ARE READY TO BE MADE: [x] YES    Best Family or Patient Contact (if needed):    Additional Information about above appointments (if needed):    1: Dr. Loza within 1 month  2:   3:     Other comments or requests:   Patient was provided with follow up request details for Dr. Chu White and Dr. Aniket Loza. Patient was advised to call to schedule follow up within specified time frame.

## 2023-05-11 NOTE — DISCHARGE NOTE NURSING/CASE MANAGEMENT/SOCIAL WORK - NSDCPEFALRISK_GEN_ALL_CORE
For information on Fall & Injury Prevention, visit: [email or web address] OR  [email or web address] OR  https://www.cdc.gov/steadi/patient.html

## 2023-05-13 LAB
CULTURE RESULTS: SIGNIFICANT CHANGE UP
SPECIMEN SOURCE: SIGNIFICANT CHANGE UP

## 2023-05-19 ENCOUNTER — TRANSCRIPTION ENCOUNTER (OUTPATIENT)
Age: 68
End: 2023-05-19

## 2023-05-19 ENCOUNTER — RESULT REVIEW (OUTPATIENT)
Age: 68
End: 2023-05-19

## 2023-05-19 ENCOUNTER — OUTPATIENT (OUTPATIENT)
Dept: OUTPATIENT SERVICES | Facility: HOSPITAL | Age: 68
LOS: 1 days | End: 2023-05-19
Payer: MEDICARE

## 2023-05-19 ENCOUNTER — APPOINTMENT (OUTPATIENT)
Dept: NUCLEAR MEDICINE | Facility: HOSPITAL | Age: 68
End: 2023-05-19

## 2023-05-19 VITALS
HEIGHT: 72 IN | RESPIRATION RATE: 16 BRPM | TEMPERATURE: 98 F | WEIGHT: 160.06 LBS | DIASTOLIC BLOOD PRESSURE: 80 MMHG | SYSTOLIC BLOOD PRESSURE: 156 MMHG | HEART RATE: 81 BPM | OXYGEN SATURATION: 100 %

## 2023-05-19 VITALS
OXYGEN SATURATION: 100 % | DIASTOLIC BLOOD PRESSURE: 74 MMHG | SYSTOLIC BLOOD PRESSURE: 138 MMHG | HEIGHT: 72 IN | HEART RATE: 80 BPM | WEIGHT: 160.06 LBS | RESPIRATION RATE: 16 BRPM | TEMPERATURE: 98 F

## 2023-05-19 VITALS
DIASTOLIC BLOOD PRESSURE: 80 MMHG | SYSTOLIC BLOOD PRESSURE: 156 MMHG | OXYGEN SATURATION: 100 % | HEART RATE: 81 BPM | RESPIRATION RATE: 16 BRPM | TEMPERATURE: 98 F | HEIGHT: 72 IN | WEIGHT: 160.06 LBS

## 2023-05-19 DIAGNOSIS — Z00.00 ENCOUNTER FOR GENERAL ADULT MEDICAL EXAMINATION WITHOUT ABNORMAL FINDINGS: ICD-10-CM

## 2023-05-19 DIAGNOSIS — Z95.810 PRESENCE OF AUTOMATIC (IMPLANTABLE) CARDIAC DEFIBRILLATOR: Chronic | ICD-10-CM

## 2023-05-19 DIAGNOSIS — Z98.890 OTHER SPECIFIED POSTPROCEDURAL STATES: Chronic | ICD-10-CM

## 2023-05-19 DIAGNOSIS — C22.0 LIVER CELL CARCINOMA: ICD-10-CM

## 2023-05-19 LAB
ALBUMIN SERPL ELPH-MCNC: 3.4 G/DL — SIGNIFICANT CHANGE UP (ref 3.3–5)
ALP SERPL-CCNC: 145 U/L — HIGH (ref 40–120)
ALT FLD-CCNC: 60 U/L — HIGH (ref 10–45)
AST SERPL-CCNC: 66 U/L — HIGH (ref 10–40)
BILIRUB DIRECT SERPL-MCNC: 0.3 MG/DL — SIGNIFICANT CHANGE UP (ref 0–0.3)
BILIRUB INDIRECT FLD-MCNC: 0.6 MG/DL — SIGNIFICANT CHANGE UP (ref 0.2–1)
BILIRUB SERPL-MCNC: 0.9 MG/DL — SIGNIFICANT CHANGE UP (ref 0.2–1.2)
BLD GP AB SCN SERPL QL: NEGATIVE — SIGNIFICANT CHANGE UP
GAS PNL BLDV: SIGNIFICANT CHANGE UP
PROT SERPL-MCNC: 6 G/DL — SIGNIFICANT CHANGE UP (ref 6–8.3)
RH IG SCN BLD-IMP: POSITIVE — SIGNIFICANT CHANGE UP

## 2023-05-19 PROCEDURE — 76937 US GUIDE VASCULAR ACCESS: CPT | Mod: 26

## 2023-05-19 PROCEDURE — 82330 ASSAY OF CALCIUM: CPT

## 2023-05-19 PROCEDURE — 83605 ASSAY OF LACTIC ACID: CPT

## 2023-05-19 PROCEDURE — 75726 ARTERY X-RAYS ABDOMEN: CPT

## 2023-05-19 PROCEDURE — 78830 RP LOCLZJ TUM SPECT W/CT 1: CPT | Mod: 26,MG

## 2023-05-19 PROCEDURE — 86901 BLOOD TYPING SEROLOGIC RH(D): CPT

## 2023-05-19 PROCEDURE — 36247 INS CATH ABD/L-EXT ART 3RD: CPT

## 2023-05-19 PROCEDURE — 82947 ASSAY GLUCOSE BLOOD QUANT: CPT

## 2023-05-19 PROCEDURE — 77263 THER RADIOLOGY TX PLNG CPLX: CPT

## 2023-05-19 PROCEDURE — 82803 BLOOD GASES ANY COMBINATION: CPT

## 2023-05-19 PROCEDURE — G1004: CPT

## 2023-05-19 PROCEDURE — 75726 ARTERY X-RAYS ABDOMEN: CPT | Mod: 26

## 2023-05-19 PROCEDURE — 76380 CAT SCAN FOLLOW-UP STUDY: CPT

## 2023-05-19 PROCEDURE — 80076 HEPATIC FUNCTION PANEL: CPT

## 2023-05-19 PROCEDURE — 76380 CAT SCAN FOLLOW-UP STUDY: CPT | Mod: 26

## 2023-05-19 PROCEDURE — C1760: CPT

## 2023-05-19 PROCEDURE — 36248 INS CATH ABD/L-EXT ART ADDL: CPT

## 2023-05-19 PROCEDURE — 75774 ARTERY X-RAY EACH VESSEL: CPT | Mod: 59

## 2023-05-19 PROCEDURE — 82435 ASSAY OF BLOOD CHLORIDE: CPT

## 2023-05-19 PROCEDURE — 75774 ARTERY X-RAY EACH VESSEL: CPT | Mod: 26,59

## 2023-05-19 PROCEDURE — 85018 HEMOGLOBIN: CPT

## 2023-05-19 PROCEDURE — C1894: CPT

## 2023-05-19 PROCEDURE — 84295 ASSAY OF SERUM SODIUM: CPT

## 2023-05-19 PROCEDURE — 85014 HEMATOCRIT: CPT

## 2023-05-19 PROCEDURE — 77290 THER RAD SIMULAJ FIELD CPLX: CPT

## 2023-05-19 PROCEDURE — 77290 THER RAD SIMULAJ FIELD CPLX: CPT | Mod: 26

## 2023-05-19 PROCEDURE — 77300 RADIATION THERAPY DOSE PLAN: CPT

## 2023-05-19 PROCEDURE — C1769: CPT

## 2023-05-19 PROCEDURE — C1887: CPT

## 2023-05-19 PROCEDURE — 77300 RADIATION THERAPY DOSE PLAN: CPT | Mod: 26

## 2023-05-19 PROCEDURE — 76937 US GUIDE VASCULAR ACCESS: CPT

## 2023-05-19 PROCEDURE — 36415 COLL VENOUS BLD VENIPUNCTURE: CPT

## 2023-05-19 PROCEDURE — 86850 RBC ANTIBODY SCREEN: CPT

## 2023-05-19 PROCEDURE — 86900 BLOOD TYPING SEROLOGIC ABO: CPT

## 2023-05-19 PROCEDURE — 78830 RP LOCLZJ TUM SPECT W/CT 1: CPT | Mod: MG

## 2023-05-19 PROCEDURE — 84132 ASSAY OF SERUM POTASSIUM: CPT

## 2023-05-19 RX ORDER — HYDROMORPHONE HYDROCHLORIDE 2 MG/ML
0.5 INJECTION INTRAMUSCULAR; INTRAVENOUS; SUBCUTANEOUS
Refills: 0 | Status: DISCONTINUED | OUTPATIENT
Start: 2023-05-19 | End: 2023-05-19

## 2023-05-19 RX ORDER — ONDANSETRON 8 MG/1
4 TABLET, FILM COATED ORAL EVERY 6 HOURS
Refills: 0 | Status: DISCONTINUED | OUTPATIENT
Start: 2023-05-19 | End: 2023-06-02

## 2023-05-19 RX ADMIN — HYDROMORPHONE HYDROCHLORIDE 0.5 MILLIGRAM(S): 2 INJECTION INTRAMUSCULAR; INTRAVENOUS; SUBCUTANEOUS at 15:27

## 2023-05-19 RX ADMIN — HYDROMORPHONE HYDROCHLORIDE 0.5 MILLIGRAM(S): 2 INJECTION INTRAMUSCULAR; INTRAVENOUS; SUBCUTANEOUS at 16:27

## 2023-05-19 NOTE — PROCEDURE NOTE - PROCEDURE FINDINGS AND DETAILS
R CFA access. Successful mapping angiogram and identification of tumor vascularity. Intra-arterial injection of [id number]. R groin closed w/ Mynx.

## 2023-05-19 NOTE — ASU PATIENT PROFILE, ADULT - FALL HARM RISK - HARM RISK INTERVENTIONS

## 2023-05-19 NOTE — PRE PROCEDURE NOTE - PRE PROCEDURE EVALUATION
Pre-Interventional Radiology Procedure Note    67y - Male    Procedure: Lung mapping angiogram.    Diagnosis/Indication: Patient is a 67y old  Male who presents for Lung mapping angiogram.    PAST MEDICAL & SURGICAL HISTORY:  Hypertension      DM (diabetes mellitus)      HLD (hyperlipidemia)      Nonischemic cardiomyopathy  s/p biventricular AICD March 2014      Liver cell carcinoma      Ascites      AICD (automatic cardioverter/defibrillator) present    H/O excision of mass    Assessment:  Patient is a 67y old  Male who presents for Mapping angiogram.

## 2023-05-19 NOTE — ASU DISCHARGE PLAN (ADULT/PEDIATRIC) - ASU DC SPECIAL INSTRUCTIONSFT
Hepatic angiogram/mapping discharge instructions:    - There may be a small area of bruising around the groin site.                   - You may resume your normal diet.  - You may resume your normal medications as instructed.  - Do not drive, engage in heavy lifting or strenuous activity, or drink any alcoholic beverages for the next 24 hours.     If you have a problem that you believe requires IMMEDIATE attention, please go to your NEAREST Emergency Room.    Notify your primary physician and/or Interventional Radiology IMMEDIATELY if you experience any of the following       - Fever of 101F or 38C       - Chills or Rigors/ Shakes       - Worsening Pain       - Blood soaked bandages or worsening bleeding       - Lightheadedness and/or dizziness upon standing       - Chest Pain/ Tightness       - Shortness of Breath       - Difficulty walking    Follow Up Instructions:   - Please call the IR Office at [phone] with any questions about your upcoming radioembolization (Y90) treatment.

## 2023-05-19 NOTE — ASU PREOP CHECKLIST - BP NONINVASIVE DIASTOLIC (MM HG)
[facility] Daily Progress     Patient Name: Bright Lockett  Date: 3/30/2018  Visit #: 3/10 through 18  PTA visit #:  1  PRECAUTIONS: cerebral palsy, aortic sclerosis  Referral Diagnosis: Muscle tension headache  Referring provider: Ladarius Soria, *  Visit Diagnosis:     ICD-10-CM    1. Tension headache G44.209    2. Decreased ROM of neck R29.898    3. Abnormal posture R29.3          Assessment:     Signs/sx consistent with acute headaches, left>right-sided, most likely tension-related.  HEP/POC compliance is  good .  Patient is appropriate to continue with skilled physical therapy intervention, as indicated by initial plan of care.    Goal Status:  Pt. will be independent with home exercise program in : 4 weeks  Pt. will report decreased intensity, frequency of : Headache;in 4 weeks;Comment  Comment:: pain intensity <= 2/10 and <= 2 days/week  Pt. will have improved quality of sleep: waking less times/night;in 4 weeks;Comment  Comment:: back to his regular sleep schedule    Plan / Patient Education:     Trial seated neck retraction next visit.  Continue SOR.    Subjective:     Pain Ratin/10 currently GRACE.   GRACE still continuing to affect his sleep cycle.  Feels his neck spasms to be coming back, likely from the L/R ROT AROM he attributes.    Objective:     Discontinued cervical AROM ROT due to increased pain.  Increased muscle spasm/tone present L cervical paraspinals and suboccipitals; improved post MT.    Treatment Today     TREATMENT MINUTES COMMENTS   Evaluation     Self-care/ Home management     Manual therapy 10 Supine sustained SOR   Neuromuscular Re-education     Therapeutic Activity     Therapeutic Exercises 14 Ex per flow sheet   Gait training     Modality__________________                Total 24    Blank areas are intentional and mean the treatment did not include these items.       Pantera Goldman  3/30/2018     80

## 2023-05-19 NOTE — ASU DISCHARGE PLAN (ADULT/PEDIATRIC) - NURSING INSTRUCTIONS
Please feel free to contact us at [phone] if any problems arise. After 6PM, Monday through Friday, on weekends and on holidays, please call [phone] and ask for the radiology resident on call to be paged.

## 2023-05-20 LAB
CULTURE RESULTS: SIGNIFICANT CHANGE UP
SPECIMEN SOURCE: SIGNIFICANT CHANGE UP

## 2023-05-26 DIAGNOSIS — E87.5 HYPERKALEMIA: ICD-10-CM

## 2023-05-26 RX ORDER — SODIUM ZIRCONIUM CYCLOSILICATE 5 G/5G
5 POWDER, FOR SUSPENSION ORAL EVERY 8 HOURS
Qty: 1 | Refills: 1 | Status: ACTIVE | COMMUNITY
Start: 2023-05-26 | End: 1900-01-01

## 2023-05-30 DIAGNOSIS — C22.0 LIVER CELL CARCINOMA: ICD-10-CM

## 2023-06-06 ENCOUNTER — NON-APPOINTMENT (OUTPATIENT)
Age: 68
End: 2023-06-06

## 2023-06-06 ENCOUNTER — APPOINTMENT (OUTPATIENT)
Dept: GASTROENTEROLOGY | Facility: CLINIC | Age: 68
End: 2023-06-06
Payer: MEDICARE

## 2023-06-06 VITALS
RESPIRATION RATE: 16 BRPM | DIASTOLIC BLOOD PRESSURE: 60 MMHG | HEIGHT: 72 IN | WEIGHT: 154 LBS | OXYGEN SATURATION: 98 % | BODY MASS INDEX: 20.86 KG/M2 | HEART RATE: 82 BPM | SYSTOLIC BLOOD PRESSURE: 128 MMHG | TEMPERATURE: 97.5 F

## 2023-06-06 DIAGNOSIS — K70.30 ALCOHOLIC CIRRHOSIS OF LIVER W/OUT ASCITES: ICD-10-CM

## 2023-06-06 DIAGNOSIS — I85.00 ESOPHAGEAL VARICES W/OUT BLEEDING: ICD-10-CM

## 2023-06-06 DIAGNOSIS — R18.8 OTHER ASCITES: ICD-10-CM

## 2023-06-06 DIAGNOSIS — D12.6 BENIGN NEOPLASM OF COLON, UNSPECIFIED: ICD-10-CM

## 2023-06-06 DIAGNOSIS — D69.6 THROMBOCYTOPENIA, UNSPECIFIED: ICD-10-CM

## 2023-06-06 PROCEDURE — 99205 OFFICE O/P NEW HI 60 MIN: CPT

## 2023-06-06 RX ORDER — LISINOPRIL 30 MG/1
TABLET ORAL
Refills: 0 | Status: DISCONTINUED | COMMUNITY
End: 2023-06-06

## 2023-06-06 RX ORDER — CARVEDILOL 25 MG/1
TABLET, FILM COATED ORAL
Refills: 0 | Status: DISCONTINUED | COMMUNITY
End: 2023-06-06

## 2023-06-06 NOTE — ASSESSMENT
[FreeTextEntry1] : ELIAS MOORE is a 67 year old male with a PMH significant for ETOH induced hepatic cirrhosis, recurrent ascites requiring weekly LVPs, esophageal varices s/p banding, Hepatic Encephalopathy, [employer] AICD implant in 2014 with new generator placement 12/2022 2/2 NICM, HTN, HLD, T2DM and anxiety.\par \par Cirrhosis\par -Etiology - likely ETOH\par -Disease progression of cirrhosis discussed, including but not limited to hepatocellular carcinoma, varices with or without bleeding, hepatic encephalopathy, ascites, liver failure and death.\par -Labs ordered \par \par HCC \par -I have explained the need for imaging every 6 months to assess for HCC.\par -CT Abdomen w/IVC (1/6/23) - -Cirrhotic with portal hypertension. \par (1) Right lobe, 5.8 cm with arterial enhancement, washout, and capsule, LR-5, OPTN 5X (In October this lesion was 4.3 cm). \par (2) Right lobe, adjacent to 1st lesion, 1.5 cm with arterial enhancement and washout, OPTN 5A. \par (3) Right lobe, a couple of observations in Segment 6 that measure up to 1.2 cm, LR-3. \par (4) Left lobe 2.2 cm observation with peripheral discontinuous nodular enhancement that could be an involuting hemangioma but could be an additional HCC, LR-3, indeterminate. It does not definitively washout. Compare with remote imaging if there was something benign there. \par (5) Left lobe, tiny observations with arterial enhancement but no washout, LR-3. \par -Pt had y90 mapping on 5/19/23, pending treatment at [facility] on 6/8/23\par -Bone Scan pending\par -AFP (4/24/23) - 21,528\par \par Ascites\par -pt has weekly lvps at [facility], last one was yesterday, average removal is 11L. continue current regimen.\par -Contact provider for increased abdominal distension\par \par Variceal Screening\par -Last EGD (8/30/22) which was significant for grade 2 EVs s/p banding and PHG. Not currently on NSBB.\par -if pt experiences hematemesis, advised to go to ER immediately\par \par Encephalopathy\par -notify provider if new onset confusion\par -at least 1-2 bms/day\par \par Transplant\par -not transplant candidate given multifocal HCC\par \par Alcohol use \par -quit (June 2022), congratulated on long abstinence.\par -currently enrolled in a SBIRT support program\par -Extensive discussion of the harmful effect of alcohol.\par \par Diet\par -High Protein Low Fat Low Sugar Low Salt (up to 2G Na/day) Diet\par -No prolonged fasting\par -Mediterranean Diet info provided\par \par Health Maintenance\par -Colonoscopy 8/2022 - stool in colon, poor prep, 5mm tubular adenoma w/foci of early evolving high grade dysplasia, rectal varices, internal hemorrhoids. Needs repeat after y90 tx. Referral given for GI.\par -Can take up to 2G Tylenol per day. NO NSAIDs as these can lead to diuretic resistance and precipitate renal dysfunction in patients with advanced liver disease\par -Continue to abstain from alcohol and all illicit drugs\par -Avoid use of herbal and dietary supplements due to potential hepatotoxicity\par -Avoid eating any unpasteurized dairy products; avoid eating any raw or undercooked eggs, fish, poultry, or meat; and avoid eating raw/steamed oysters or other shellfish to avoid risk of infection.\par \par Follow Up\par -Follow up in 1 month w/labs

## 2023-06-06 NOTE — PHYSICAL EXAM
[Scleral Icterus] : Scleral Icterus noted [Spider Angioma] : Spider angioma(s) was(were) observed [Abdominal  Ascites] : ascites [Non-Tender] : non-tender [Irregular] : irregular [Jaundice] : Jaundice [General Appearance - Alert] : alert [General Appearance - In No Acute Distress] : in no acute distress [Outer Ear] : the ears and nose were normal in appearance [Oropharynx] : the oropharynx was normal [Neck Appearance] : the appearance of the neck was normal [] : no respiratory distress [Bowel Sounds] : normal bowel sounds [Abdomen Soft] : soft [Abdomen Tenderness] : non-tender [Abnormal Walk] : normal gait [Oriented To Time, Place, And Person] : oriented to person, place, and time [Impaired Insight] : insight and judgment were intact [Memory Recent] : recent memory was not impaired [Asterixis] : no asterixis observed [Palmar Erythema] : no Palmar Erythema [Depression] : no depression [Hallucinations] : ~T no ~M hallucinations

## 2023-06-06 NOTE — ADDENDUM
[FreeTextEntry1] : I, Blanca Montoya NP, acted as scribe for ADRIANA Morocho for this patient encounter.

## 2023-06-06 NOTE — HISTORY OF PRESENT ILLNESS
[de-identified] : ELIAS MOORE is a 67 year old male with a PMH significant for ETOH induced hepatic cirrhosis, recurrent ascites requiring weekly LVPs, esophageal varices s/p banding, Hepatic Encephalopathy, [employer] AICD implant in 2014 with new generator placement 12/2022 2/2 NICM, HTN, HLD, T2DM and anxiety.\par \par Dr. Loza note from 4/24/23: Lives alone & states he is self sufficient conducting all ADLs. States he has help available - friends nearby - should he need it. No close family nearby\par Reports quality of life is compromised 2/2 recurrent ascites requiring frequent LVPs.\par He was initially referred to IR for TIPS eval but sent to us for evaluation for HCC treatment and establishment of OLT candidacy\par \par He denies abdominal pain, scleral or dermal discoloration, LE edema, altered mentation, hematemesis, hemoptysis, hematochezia, melena, n/v/d, unintentional weight loss or gain but reports abdominal distention & recurrent ascites and pain related to this\par His weight has been stable\par His last paracentesis was yesterday\par Denies nsaids or a/c usage.\par Former gaspar\par \par Colonoscopy 8/2022 - stool in colon, small polyp 5mm, rectal varices, internal hemorrhoids\par EGD - EV banded and eradicated, PHG with gastritis, duodenal nodule\par +FHX of colon cancer\par \par PCP Dr Chu White [phone]\par Cardiac Dr aMta [phone]\par GI: Sukhjinder Salazar - [phone]\par Oncology - Brannon Kee - [phone]\par \par patient was referred to [facility] for OLT eval in spring/summer\par reportedly underwent eval but no treatment of HCC was discussed\par Insurance no longer covered at [facility] and patient lost to follow up though have to clarify records\par Unclear how much patient remembers from evaluation\par Unclear what type of metastatic workup has been done\par \par Had Tumor board discussion. Getting LVP every week. Finally spoke with Onc from [facility]. They want to proceed with y90\par \par 3/15/23: Interval Hx\par - patient with recent admission for hypokalemia and [place] \par - he has serial paracentesis over the past week or so\par - started on PO Lasix and Spironolactone\par - have 2 BM per day\par - new onset tremors - no asterixis \par - episodes of VT - started on amiodarone\par - He has yet to complete y90\par - He feels much better today\par - will get repeat labs including AFP\par - Discussed role of indwelling catheter - patient concerned about infection so prefers serial taps\par \par 6/6/23: Pt presents today for establishment of care. He was previously managed by Dr. Loza, see note above, but wishes to transfer here as it is geographically closer. Pt was found to have multifocal HCC w/elevated AFP (21,528) at [facility] last year. He has recently undergone y90 mapping is scheduled for treatment on 6/8/23 at [facility]. He currently undergoes weekly LVPs at [facility], last one was yesterday which removed 11L. He is not currently on diuretics. He has 2 bms/day. Denies confusion, hematemesis, or black stools. He had an EGD (8/30/22) which was significant for grade 2 EVs s/p banding and PHG. Not currently on NSBB.

## 2023-06-07 DIAGNOSIS — C22.0 LIVER CELL CARCINOMA: ICD-10-CM

## 2023-06-08 ENCOUNTER — RESULT REVIEW (OUTPATIENT)
Age: 68
End: 2023-06-08

## 2023-06-08 ENCOUNTER — OUTPATIENT (OUTPATIENT)
Dept: OUTPATIENT SERVICES | Facility: HOSPITAL | Age: 68
LOS: 1 days | End: 2023-06-08
Payer: MEDICARE

## 2023-06-08 ENCOUNTER — TRANSCRIPTION ENCOUNTER (OUTPATIENT)
Age: 68
End: 2023-06-08

## 2023-06-08 VITALS
OXYGEN SATURATION: 100 % | HEIGHT: 72 IN | WEIGHT: 154.98 LBS | DIASTOLIC BLOOD PRESSURE: 78 MMHG | SYSTOLIC BLOOD PRESSURE: 144 MMHG | RESPIRATION RATE: 18 BRPM | HEART RATE: 80 BPM

## 2023-06-08 VITALS
HEART RATE: 78 BPM | OXYGEN SATURATION: 100 % | RESPIRATION RATE: 16 BRPM | SYSTOLIC BLOOD PRESSURE: 118 MMHG | DIASTOLIC BLOOD PRESSURE: 72 MMHG

## 2023-06-08 DIAGNOSIS — C22.0 LIVER CELL CARCINOMA: ICD-10-CM

## 2023-06-08 DIAGNOSIS — Z98.890 OTHER SPECIFIED POSTPROCEDURAL STATES: Chronic | ICD-10-CM

## 2023-06-08 DIAGNOSIS — Z95.810 PRESENCE OF AUTOMATIC (IMPLANTABLE) CARDIAC DEFIBRILLATOR: Chronic | ICD-10-CM

## 2023-06-08 LAB
ALBUMIN SERPL ELPH-MCNC: 3.6 G/DL — SIGNIFICANT CHANGE UP (ref 3.3–5)
ALP SERPL-CCNC: 112 U/L — SIGNIFICANT CHANGE UP (ref 40–120)
ALT FLD-CCNC: 54 U/L — HIGH (ref 10–45)
ANION GAP SERPL CALC-SCNC: 15 MMOL/L — SIGNIFICANT CHANGE UP (ref 5–17)
APTT BLD: 28.6 SEC — SIGNIFICANT CHANGE UP (ref 27.5–35.5)
AST SERPL-CCNC: 55 U/L — HIGH (ref 10–40)
BILIRUB SERPL-MCNC: 1.3 MG/DL — HIGH (ref 0.2–1.2)
BUN SERPL-MCNC: 42 MG/DL — HIGH (ref 7–23)
CALCIUM SERPL-MCNC: 9.6 MG/DL — SIGNIFICANT CHANGE UP (ref 8.4–10.5)
CHLORIDE SERPL-SCNC: 97 MMOL/L — SIGNIFICANT CHANGE UP (ref 96–108)
CO2 SERPL-SCNC: 17 MMOL/L — LOW (ref 22–31)
CREAT SERPL-MCNC: 1.43 MG/DL — HIGH (ref 0.5–1.3)
EGFR: 54 ML/MIN/1.73M2 — LOW
GLUCOSE BLDC GLUCOMTR-MCNC: 199 MG/DL — HIGH (ref 70–99)
GLUCOSE SERPL-MCNC: 191 MG/DL — HIGH (ref 70–99)
HCT VFR BLD CALC: 33.9 % — LOW (ref 39–50)
HGB BLD-MCNC: 11.6 G/DL — LOW (ref 13–17)
INR BLD: 1.2 RATIO — HIGH (ref 0.88–1.16)
MCHC RBC-ENTMCNC: 33.4 PG — SIGNIFICANT CHANGE UP (ref 27–34)
MCHC RBC-ENTMCNC: 34.2 GM/DL — SIGNIFICANT CHANGE UP (ref 32–36)
MCV RBC AUTO: 97.7 FL — SIGNIFICANT CHANGE UP (ref 80–100)
NRBC # BLD: 0 /100 WBCS — SIGNIFICANT CHANGE UP (ref 0–0)
PLATELET # BLD AUTO: 134 K/UL — LOW (ref 150–400)
POTASSIUM SERPL-MCNC: 4.9 MMOL/L — SIGNIFICANT CHANGE UP (ref 3.5–5.3)
POTASSIUM SERPL-SCNC: 4.9 MMOL/L — SIGNIFICANT CHANGE UP (ref 3.5–5.3)
PROT SERPL-MCNC: 6.7 G/DL — SIGNIFICANT CHANGE UP (ref 6–8.3)
PROTHROM AB SERPL-ACNC: 13.8 SEC — HIGH (ref 10.5–13.4)
RBC # BLD: 3.47 M/UL — LOW (ref 4.2–5.8)
RBC # FLD: 14.6 % — HIGH (ref 10.3–14.5)
SODIUM SERPL-SCNC: 129 MMOL/L — LOW (ref 135–145)
WBC # BLD: 4.84 K/UL — SIGNIFICANT CHANGE UP (ref 3.8–10.5)
WBC # FLD AUTO: 4.84 K/UL — SIGNIFICANT CHANGE UP (ref 3.8–10.5)

## 2023-06-08 PROCEDURE — 36248 INS CATH ABD/L-EXT ART ADDL: CPT

## 2023-06-08 PROCEDURE — 36247 INS CATH ABD/L-EXT ART 3RD: CPT

## 2023-06-08 PROCEDURE — 36415 COLL VENOUS BLD VENIPUNCTURE: CPT

## 2023-06-08 PROCEDURE — 85027 COMPLETE CBC AUTOMATED: CPT

## 2023-06-08 PROCEDURE — 79445 NUCLEAR RX INTRA-ARTERIAL: CPT | Mod: 26,1L

## 2023-06-08 PROCEDURE — 82962 GLUCOSE BLOOD TEST: CPT

## 2023-06-08 PROCEDURE — C1769: CPT

## 2023-06-08 PROCEDURE — 86900 BLOOD TYPING SEROLOGIC ABO: CPT

## 2023-06-08 PROCEDURE — 80053 COMPREHEN METABOLIC PANEL: CPT

## 2023-06-08 PROCEDURE — C1760: CPT

## 2023-06-08 PROCEDURE — C2616: CPT

## 2023-06-08 PROCEDURE — 86901 BLOOD TYPING SEROLOGIC RH(D): CPT

## 2023-06-08 PROCEDURE — 76937 US GUIDE VASCULAR ACCESS: CPT

## 2023-06-08 PROCEDURE — 76937 US GUIDE VASCULAR ACCESS: CPT | Mod: 26

## 2023-06-08 PROCEDURE — C1887: CPT

## 2023-06-08 PROCEDURE — 37243 VASC EMBOLIZE/OCCLUDE ORGAN: CPT

## 2023-06-08 PROCEDURE — 85610 PROTHROMBIN TIME: CPT

## 2023-06-08 PROCEDURE — 86850 RBC ANTIBODY SCREEN: CPT

## 2023-06-08 PROCEDURE — C1894: CPT

## 2023-06-08 PROCEDURE — 85730 THROMBOPLASTIN TIME PARTIAL: CPT

## 2023-06-08 RX ORDER — ONDANSETRON 8 MG/1
4 TABLET, FILM COATED ORAL ONCE
Refills: 0 | Status: DISCONTINUED | OUTPATIENT
Start: 2023-06-08 | End: 2023-06-22

## 2023-06-08 RX ORDER — METFORMIN HYDROCHLORIDE 850 MG/1
1 TABLET ORAL
Qty: 0 | Refills: 0 | DISCHARGE

## 2023-06-08 RX ORDER — FUROSEMIDE 40 MG
1 TABLET ORAL
Qty: 0 | Refills: 0 | DISCHARGE

## 2023-06-08 RX ORDER — ALPRAZOLAM 0.25 MG
1 TABLET ORAL
Qty: 0 | Refills: 0 | DISCHARGE

## 2023-06-08 NOTE — ASU DISCHARGE PLAN (ADULT/PEDIATRIC) - NURSING INSTRUCTIONS
Please feel free to contact us at [phone] if any problems arise. After 6PM, Monday through Friday, on weekends and on holidays, please call [phone] and ask for the radiology resident on call to be paged.

## 2023-06-08 NOTE — PRE-ANESTHESIA EVALUATION ADULT - NSANTHPMHFT_GEN_ALL_CORE
s/p mapping 5/2023  AICD interrogation report reviewed  AICD never received shock per pt  cirrhosis with portal HTN received weekly paracentesis every monday last this monday 6/5  Currently denies abdominal distension denies N/V   good ET no CP/SOB

## 2023-06-08 NOTE — ASU DISCHARGE PLAN (ADULT/PEDIATRIC) - NS MD DC FALL RISK RISK
For information on Fall & Injury Prevention, visit: [email or web address] OR  [email or web address] OR  https://www.cdc.gov/steadi/patient.html

## 2023-06-08 NOTE — PROCEDURE NOTE - NSTIMEOUT_GEN_A_CORE
Patient's first and last name, , procedure, and correct site confirmed prior to the start of procedure. No masses; no nipple discharge

## 2023-06-08 NOTE — PRE PROCEDURE NOTE - PRE PROCEDURE EVALUATION
HPI: 67y Male with HCC s/p mapping 5/19 presenting for radioembolization.    Allergies: No Known Allergies    Medications (Abx/Cardiac/Anticoagulation/Blood Products)      Data:  182.9  70.3  T(C): --  HR: 80  BP: 144/78  RR: 18  SpO2: 100%    Exam  General: NAD  Chest: Nonlabored breathing    -WBC 4.84 / HgB 11.6 / Hct 33.9 / Plt 134    -INR1.20    Imaging: Reviewed    Plan:   -67y Male presents for Y90 radioembolization of right hepatic HCC.  -Risks/Benefits/alternatives explained with the patient and/or healthcare proxy and witnessed informed consent obtained.

## 2023-06-08 NOTE — ASU DISCHARGE PLAN (ADULT/PEDIATRIC) - ASU DC SPECIAL INSTRUCTIONSFT
Post SIRT Discharge Instructions    - You underwent a radioembolization to treat your liver tumor (s) on 6/8/2023 by Dr Baron    - Monitor right groin site for symptoms of bleeding, hard area underneath the skin, bruising, numbness, intense pain, or inability to move.  If you have any of these symptoms, contact your doctor and seek immediate medical attention    - You may have mild abdominal pain, nausea, loss of appetite, fatigue, and/or low grade fever.  These are normal after your procedure and they should resolve within 3-5 days.  Please call Interventional Radiology if you have a fever > 102.0 F.  If you have persistent nausea, contact IR and we can prescribe anti-nausea medication.     - Please report chills, temperature > 101.0, persistent nausea or vomiting, severe pain, confusion, yellowing of the skin, or abdominal swelling.    - Please refer to the " Post SIRT Radiation Safety Instructions" sheet that was provided.  Please adhere to them for 72 hours after your procedure.     - Continue your PPI (Nexium, Prilosec, Protonix) for 30 days post procedure.    - Start Medrol dose pack the day after your SIRT procedure (unless otherwise instructed).    - A follow up phone call will be performed the day after the procedure.     - Blood work is to be performed 2 weeks after your procedure (you will be given a paper prescription).  To locate the closest [facility] lab, call [phone]. If you have labwork performed at a NON-[facility] lab, please request that the results be faxed to [phone].      - Please call the IR booking department at [phone] to schedule a follow up visit with Dr Baron in 1 month.     - Follow up with your hepatologist or oncologist in 2 weeks.     - Post procedure imaging study is to be performed 2 months post procedure (CT or MRI).  You will be given a prescription for this at your initial 1 month follow up visit. Our booking office will obtain authorization from your insurance company, if required.    - Please contact the Nurse Practitioner with any questions or concerns directly at [phone] or [phone] from 8 am to 6 pm, Monday - Friday.  If after weekday hours, on weekends or holidays, please call [phone] and ask to speak with the "Interventional Radiology Resident on-call".  When you speak with the Resident, let them know what procedure you had and they will get in touch with the Interventional Radiology Attending Physician who is on-call.

## 2023-06-09 ENCOUNTER — APPOINTMENT (OUTPATIENT)
Dept: INTERVENTIONAL RADIOLOGY/VASCULAR | Facility: CLINIC | Age: 68
End: 2023-06-09

## 2023-06-21 DIAGNOSIS — C22.0 LIVER CELL CARCINOMA: ICD-10-CM

## 2023-07-13 ENCOUNTER — APPOINTMENT (OUTPATIENT)
Dept: GASTROENTEROLOGY | Facility: CLINIC | Age: 68
End: 2023-07-13

## 2023-07-26 ENCOUNTER — NON-APPOINTMENT (OUTPATIENT)
Age: 68
End: 2023-07-26

## 2023-11-02 NOTE — ED ADULT NURSE NOTE - PAIN: BODY LOCATION
Health Maintenance Due   Topic Date Due   • Hepatitis B Vaccine (1 of 3 - 3-dose series) Never done   • Influenza Vaccine (1) 09/01/2023   • COVID-19 Vaccine (5 - 2023-24 season) 09/01/2023       Patient is due for topics as listed above but is not proceeding with Immunization(s) COVID-19, Hep B and Influenza at this time. Education provided for Immunization(s) COVID-19, Hep B and Influenza.   Bilateral:/lower back

## 2024-07-08 NOTE — OCCUPATIONAL THERAPY INITIAL EVALUATION ADULT - HEALTH SCREEN CRITERIA
Rx Refill Note  Requested Prescriptions     Signed Prescriptions Disp Refills    amLODIPine (NORVASC) 10 MG tablet 90 tablet 2     Sig: TAKE ONE TABLET BY MOUTH EVERY DAY     Authorizing Provider: CHAPINCITO SHEA     Ordering User: YONI PAEZ      Last office visit with prescribing clinician: 4/8/2024   Last telemedicine visit with prescribing clinician: Visit date not found   Next office visit with prescribing clinician: 4/10/2025                         Yoni Paez MA  07/08/24, 09:28 EDT  
yes

## 2024-11-27 NOTE — PRE-OP CHECKLIST - ASSESSMENT, HISTORY & PHYSICAL COMPLETED AND ON MEDICAL RECORD
Requested Prescriptions     Pending Prescriptions Disp Refills    Omeprazole 40 MG Oral Capsule Delayed Release 90 capsule 3     Sig: Take 1 capsule (40 mg total) by mouth every morning before breakfast.       LOV  10/31/2024  LR  11/20/2023  Routed to MD on call    em   done

## 2025-07-27 NOTE — H&P ADULT - PROBLEM SELECTOR PLAN 3
Problem: Pain  Goal: Verbalizes/displays adequate comfort level or baseline comfort level  Outcome: Completed     Problem: Safety - Adult  Goal: Free from fall injury  7/27/2025 1158 by Charis Rogers, GLADIS  Outcome: Completed  7/27/2025 1020 by Charis Rogers, RN  Outcome: Progressing      likely from solute depletion  may need salt tabs  will monitor for now as Lasix is being held  baseline is 130